# Patient Record
Sex: FEMALE | Race: WHITE | NOT HISPANIC OR LATINO | Employment: FULL TIME | URBAN - METROPOLITAN AREA
[De-identification: names, ages, dates, MRNs, and addresses within clinical notes are randomized per-mention and may not be internally consistent; named-entity substitution may affect disease eponyms.]

---

## 2018-09-05 ENCOUNTER — TELEPHONE (OUTPATIENT)
Dept: GASTROENTEROLOGY | Facility: CLINIC | Age: 23
End: 2018-09-05

## 2018-09-18 ENCOUNTER — TELEPHONE (OUTPATIENT)
Dept: GASTROENTEROLOGY | Facility: CLINIC | Age: 23
End: 2018-09-18

## 2018-09-18 NOTE — TELEPHONE ENCOUNTER
LVM for [facility] medical records team in regards to message received from call center. Informed team we still do not have records for this patient. Asked medical records team to return my phone call.

## 2018-09-20 NOTE — TELEPHONE ENCOUNTER
Talked to pt's mother and offered appt next week with Dr. Scott.  Mother states that pt wants to see Dr. Moeller. Informed that did not have any opening in the new future. Offered  October 30 and declined.  Offered  and accepted November 5.  Pt was seeing Dr. Tolentino who has moved to [place]. 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  Given my number if symptoms increase and need urgent appt.  Pt is seeing a NP in [place] at present time.

## 2018-11-01 ENCOUNTER — TELEPHONE (OUTPATIENT)
Dept: GASTROENTEROLOGY | Facility: CLINIC | Age: 23
End: 2018-11-01

## 2018-11-01 NOTE — TELEPHONE ENCOUNTER
Called and received message mailbox full on patient's cell phone #. Called home phone and spoke with patient's Mother-Cassy, reminding of appointment for 11/5/18 at 3:40PM.

## 2018-11-02 NOTE — TELEPHONE ENCOUNTER
FUTURE VISIT INFORMATION      FUTURE VISIT INFORMATION:    Date: 11/5/18    Time:     Location: [facility]  REFERRAL INFORMATION:    Referring provider:  Dr. Madrigal    Referring providers clinic:  [facility]    Reason for visit/diagnosis: Ulcerative Pancolitis    RECORDS REQUESTED FROM:       NOTES STATUS DETAILS   OFFICE NOTE from referring provider Care Everywhere    OFFICE NOTE from other specialist N/A    DISCHARGE SUMMARY from hospital Care Everywhere    OPERATIVE REPORT N/A    MEDICATION LIST Care Everywhere         ENDOSCOPY  N/A    COLONOSCOPY Care Everywhere    ERCP N/A    EUS N/A    STOOL TESTING Care Everywhere    PERTINENT LABS Care Everywhere    PATHOLOGY REPORTS (RELATED) Care Everywhere    IMAGING (CT, MRI, EGD) Care Everywhere

## 2018-11-05 ENCOUNTER — MEDICAL CORRESPONDENCE (OUTPATIENT)
Dept: HEALTH INFORMATION MANAGEMENT | Facility: CLINIC | Age: 23
End: 2018-11-05

## 2018-11-05 ENCOUNTER — OFFICE VISIT (OUTPATIENT)
Dept: GASTROENTEROLOGY | Facility: CLINIC | Age: 23
End: 2018-11-05
Payer: COMMERCIAL

## 2018-11-05 ENCOUNTER — PRE VISIT (OUTPATIENT)
Dept: GASTROENTEROLOGY | Facility: CLINIC | Age: 23
End: 2018-11-05

## 2018-11-05 VITALS
WEIGHT: 164.8 LBS | HEIGHT: 70 IN | DIASTOLIC BLOOD PRESSURE: 66 MMHG | TEMPERATURE: 97.4 F | BODY MASS INDEX: 23.59 KG/M2 | OXYGEN SATURATION: 99 % | SYSTOLIC BLOOD PRESSURE: 125 MMHG | HEART RATE: 89 BPM

## 2018-11-05 DIAGNOSIS — K51.011 ULCERATIVE PANCOLITIS WITH RECTAL BLEEDING (H): Primary | ICD-10-CM

## 2018-11-05 DIAGNOSIS — K51.90 ULCERATIVE COLITIS (H): ICD-10-CM

## 2018-11-05 RX ORDER — LORATADINE 10 MG/1
10 TABLET ORAL
COMMUNITY
End: 2020-03-23

## 2018-11-05 RX ORDER — PREDNISONE 10 MG/1
40 TABLET ORAL
COMMUNITY
Start: 2018-08-30 | End: 2019-05-28

## 2018-11-05 RX ORDER — AZATHIOPRINE 50 MG/1
TABLET ORAL
Refills: 0 | COMMUNITY
Start: 2018-07-10 | End: 2020-05-08

## 2018-11-05 RX ORDER — DIPHENHYDRAMINE HCL 25 MG
25 CAPSULE ORAL
COMMUNITY
End: 2020-03-23

## 2018-11-05 ASSESSMENT — ENCOUNTER SYMPTOMS
ABDOMINAL PAIN: 0
BOWEL INCONTINENCE: 0
DIARRHEA: 0
BLOATING: 0
VOMITING: 0
HEARTBURN: 0
BLOOD IN STOOL: 1
JAUNDICE: 0
NAUSEA: 0
RECTAL PAIN: 0
CONSTIPATION: 0

## 2018-11-05 ASSESSMENT — PAIN SCALES - GENERAL: PAINLEVEL: NO PAIN (0)

## 2018-11-05 NOTE — NURSING NOTE
Printed after visit summary given to pt.  Pt sent to the lab.  Will do remicade level today.  Printed lab order for thiopurine level sent with pt.

## 2018-11-05 NOTE — NURSING NOTE
"Chief Complaint   Patient presents with     Consult     NEW - IBD        Vitals:    11/05/18 1453   BP: 125/66   Pulse: 89   Temp: 97.4  F (36.3  C)   TempSrc: Oral   SpO2: 99%   Weight: 164 lb 12.8 oz   Height: 5' 10\"       Body mass index is 23.65 kg/(m^2).      Anibal Martinez on 11/5/2018 at 3:00 PM                       "

## 2018-11-05 NOTE — PROGRESS NOTES
IBD CLINIC VISIT     CC/REFERRING MD:  Brandt Madrigal  REASON FOR CONSULTATION: Ulcerative colitis    IBD HISTORY  Age at diagnosis: 20  Extent of disease: pancolitis  Current UC medications:  - Infliximab 5mg/kg (400mg) q6  - Azathioprine 50mg (sepetmebr 2018)  Prior UC surgeries:  Prior IBD Medications:  - Azathioprine 150mg --> flu like illness  - Canasa    DRUG MONITORING  TPMT enzyme activity:     6-TGN/6-MMPN levels:    Biologic concentration:  7/3/18: IFX: <1, Ab 30 (normal < 50) - Rocha test (q8 weeks, monotherapy)    DISEASE ASSESSMENT  Labs:  No lab results found.    Invalid input(s):  ALB,  HGB  Endoscopic assessment: 2017: normal TI, inflam polyps in ascending and transverse. eMayo 1 inflammation in distal sigmoid and rectum  Enterography: Normal small bowel (3/23/16)  Fecal calprotectin: --  C diff: Positive August 2018  pMayo score: 0/9 (on steroids)    ASSESSMENT/PLAN    1. UC: Currently she is in clinical response to infliximab and azathioprine, although is still on steroids with difficulty tapering.  I am somewhat concerned for the development of antibodies to infliximab.  Through the Rocha system she had low level antibodies with an undetectable level.  She additionally has infusion reaction that is rate related.  I wonder if her current benefit is due to the initiation of azathioprine and steroids rather than infliximab.  We need to determine if her infliximab level is therapeutic or if she has antibodies to infliximab.  Given that she is under 20 mg of prednisone I would like her to restart azathioprine at 50 mg a day.  We should ensure that she has optimal thiopurine metabolites given her difficult to control disease.  Based on the infliximab levels will proceed with either continuing infliximab/optimizing dose or changing class.  Options for class change include vedolizumab or tofacitinib.  Would opt for vedolizumab first and treat in conjunction with azathioprine.    We briefly discussed the  possibility of surgery in her future.  She is at higher risk for colectomy given the fact that she has had a hospitalization requiring IV steroids, history of pancolitis, and C. difficile positive stools.  I certainly think we should maximize medical therapy but did encourage her that now was a time to learn about surgical options in case we need to cross that bridge in the future.     -- IFX level today  -- Re-start azathioprine 50mg daily  -- Check azathioprine level in about 3 weeks (of stable dosing)  -- If antibodies to IFX at this time - rec change to another anti-TNF  -- If low levels of IFX and no antibodies, recommend optimizing thiopurine and increasing IFX dosing  -- If therapeutic levels of IFX at this time - recommend flex sig and if inflammation, change to vedolizumab or tofacitinib.    -- Consider resuming mesalamine if other therapies optimized and continued mild endoscopic inflammation    2.  C. difficile infection: Stools are currently formed.  Generally I maintain a suppressive dose of vancomycin until patients are off steroids and mucosal healing based on flexible sigmoidoscopy.  I do think she has a high risk for recurrence and should she have any increase in diarrhea, stool should be rechecked for C. difficile.  If positive again would treat with vancomycin followed by suppressive vancomycin until she is off steroids and her UC is under control.    3. Iron deficient anemia: Secondary to ulcerative colitis.  Received iron infusions in the summer 2018.  Not currently anemic but iron labs still low.  Recommended she restart oral iron supplementation daily or every other day.  If she becomes anemic would repeat iron infusions.    IBD Health Care Maintenance:    Vaccinations:  All patients on biologics should avoid live vaccines.    -- Influenza (every year)  -- TdaP (every 10 years)  -- Pneumococcal Pneumonia (once plus booster at 5 years)  -- Yearly assessment for latent Tb (verbal screening and  exam, PPD or QuantiFERON-Tb testing)    One time confirmation of immunity or serologies:  -- Hepatitis A (serologies or immunizations)  -- Hepatitis B (serologies or immunizations)  -- Varicella  -- MMR  -- HPV (all aged 18-26)  -- Meningococcal meningitis (all patients at risk for meningitis)   Due to the immunosuppression in this patient, I would not advise administration of live vaccines such as varicella/VZV, intranasal influenza, MMR, or yellow fever vaccine (if travelling).      Bone mineral density screening   -- Recommend all patients supplement with calcium and vitamin D  -- Given steroid use recommend DEXA if not already done    Cancer Screening:  Colon cancer screening:  Given pancolitis, recommend patient undergo regular dysplasia surveillance   Next dysplasia screening is recommended 2023.    Cervical cancer screening: Annual due to immunosupression    Skin cancer screening: Annual visual exam of skin by dermatologist since patient is immunocompromised    Depression Screening:  -- Over the last month, have you felt down, depressed, or hopeless? --  -- Over the last month, have you felt little interest or pleasure doing things? --    Misc:  -- Avoid tobacco use  -- Avoid NSAIDs as there is potentially a 25% chance of causing an IBD flare    Return to clinic in 3-4 months    Thank you for this consultation.  It was a pleasure to participate in the care of this patient; please contact us with any further questions.     This note was created with voice recognition software, and while reviewed for accuracy, typos may remain.     Theodore Moeller MD   of Medicine  Inflammatory Bowel Disease Program   Division of Gastroenterology, Hepatology and Nutrition  [facility]          HPI:   This is a 23-year-old female with ulcerative colitis who comes in today for another opinion and management of ongoing ulcerative colitis.    She is initially diagnosed with proctitis is a senior in high  school.  She started suppositories but and per documentation had some benefit although the patient recalls having some intermittent ongoing bleeding.  About 6 months after diagnosis she was admitted to the hospital for severe ulcerative colitis flare.  Colonoscopy at that time demonstrated eMayo 3 pancolitis.  She was started on IV steroids and induced on Remicade.  This was in 2016.  She had a clinical improvement on infliximab.  Fecal lactoferrin on 4/2/17 was normal (< 30)Repeat colonoscopy in July 2017, 8 months of therapy demonstrated eMayo 1 inflammation.     Unfortunately in May 2018, she started to have another flare with rectal bleeding.  She was re-started on canasa in May, 2018.  However she progressed and required oral prednisone, starting on June 23.  A CT scan at that point demonstrated diffuse, pan colitis.  Fecal lactoferrin on 6/22/18 was elevated at 1811, C diff testing on 6/26/18 was negative.  She was given Infliximab while on prednisone and not getting better.  Azathiorpine was started at 150mg a day, but she developed flu like symptoms and so it was stopped.      In August 2018, she became C difficile positive.  Fecal lactoferrin at that time was 684. She was treated with vancomycin. Due to ongoing steroid use, azathiorpine was re-started at a lower dose of 50mg daily.      For past year, has been having reaction with Infliximab infusions: face itching and swelling, has to have the infusion run slowly.      Last infliximab infusion was 9/17/18. Next due 11/9/18    Currently has been on prednuisone since June 23.  When down to 10mg, rectal bleeding retuned, resolved when back to 15mg of prednisone.  She is on 15mg of prednisone today. On this regimen her stool pattern is as follows:   1-2 stools  a day.   Formed.   No rectal bleeding.   No urgency.   This is her baseline.     Most recent labs: iron deficient with iron sat of 5%. Last iron infusion in August.  No anemia.      C diff testing:    12/1/16: negative  6/26/18: negative  8/5/18: positive    ROS:    No fevers or chills  No weight loss  No blurry vision, double vision or change in vision  No sore throat  No lymphadenopathy  No headache, paraesthesias, or weakness in a limb  No shortness of breath or wheezing  No chest pain or pressure  No arthralgias or myalgias  No rashes or skin changes  No odynophagia or dysphagia  No BRBPR, hematochezia, melena  No dysuria, frequency or urgency  No hot/cold intolerance or polyria  No anxiety or depression    Extra intestinal manifestations of IBD:  No uveitis/episcleritis  No aphthous ulcers   No arthritis   No erythema nodosum/pyoderma gangrenosum.     PERTINENT PAST MEDICAL HISTORY:  Past Medical History:   Diagnosis Date     Ulcerative colitis (H)        PREVIOUS SURGERIES:  No past surgical history on file.    PREVIOUS ENDOSCOPY:  Care Everywhere    ALLERGIES:     Allergies   Allergen Reactions     Infliximab Nausea and Other (See Comments)     FACE FLUSHING WITH ITCHY/SCRATCHY THROAT.        PERTINENT MEDICATIONS:    Current Outpatient Prescriptions:      azaTHIOprine (IMURAN) 50 MG tablet, , Disp: , Rfl: 0     diphenhydrAMINE (BENADRYL) 25 MG capsule, 25 mg, Disp: , Rfl:      InFLIXimab (REMICADE IV), 400 mg, Disp: , Rfl:      loratadine (CLARITIN) 10 MG tablet, 10 mg, Disp: , Rfl:      predniSONE (DELTASONE) 10 MG tablet, 40 mg, Disp: , Rfl:     SOCIAL HISTORY:  Social History     Social History     Marital status: Single     Spouse name: N/A     Number of children: N/A     Years of education: N/A     Occupational History     Not on file.     Social History Main Topics     Smoking status: Never Smoker     Smokeless tobacco: Never Used     Alcohol use Not on file     Drug use: Not on file     Sexual activity: Not on file     Other Topics Concern     Not on file     Social History Narrative     No narrative on file       FAMILY HISTORY:  Medical History Relation Name Comments   Diabetes Father      "  High Cholesterol Father       Sleep Apnea Father       Heart Disease Maternal Grandfather       Cancer (not otherwise listed) Maternal Grandmother       Hypertension Mother       Diabetes Paternal Grandfather       Heart Disease Paternal Grandfather         Past/family/social history reviewed and no changes    PHYSICAL EXAMINATION:  Constitutional: aaox3, cooperative, pleasant, not dyspneic/diaphoretic, no acute distress  Vitals reviewed: /66  Pulse 89  Temp 97.4  F (36.3  C) (Oral)  Ht 5' 10\"  Wt 164 lb 12.8 oz  SpO2 99%  BMI 23.65 kg/m2  Wt:   Wt Readings from Last 2 Encounters:   11/05/18 164 lb 12.8 oz      Eyes: Sclera anicteric/injected  Ears/nose/mouth/throat: Normal oropharynx without ulcers or exudate, mucus membranes moist, hearing intact  Neck: supple, thyroid normal size  CV: No edema  Respiratory: Unlabored breathing  Lymph: No axillary, submandibular, supraclavicular or inguinal lymphadenopathy  Abd:  Nondistended, +bs, no hepatosplenomegaly, nontender, no peritoneal signs  Skin: warm, perfused, no jaundice  Psych: Normal affect  MSK: Normal gait      PERTINENT STUDIES:  Most recent CBC:  No lab results found.  Most recent hepatic panel:  No lab results found.    Invalid input(s): ANH, ALP  Most recent creatinine:  No lab results found.        Answers for HPI/ROS submitted by the patient on 11/5/2018   General Symptoms: No  Skin Symptoms: No  HENT Symptoms: No  EYE SYMPTOMS: No  HEART SYMPTOMS: No  LUNG SYMPTOMS: No  INTESTINAL SYMPTOMS: Yes  URINARY SYMPTOMS: No  GYNECOLOGIC SYMPTOMS: No  BREAST SYMPTOMS: No  SKELETAL SYMPTOMS: No  BLOOD SYMPTOMS: No  NERVOUS SYSTEM SYMPTOMS: No  MENTAL HEALTH SYMPTOMS: No  Heart burn or indigestion: No  Nausea: No  Vomiting: No  Abdominal pain: No  Bloating: No  Constipation: No  Diarrhea: No  Blood in stool: Yes  Black stools: No  Rectal or Anal pain: No  Fecal incontinence: No  Yellowing of skin or eyes: No  Vomit with blood: No  Change in stools: " No  PHQ-2 Score: 0

## 2018-11-05 NOTE — LETTER
11/5/2018       RE: Poonam Chung  [address] [place] [address]     Dear Colleague,    Thank you for referring your patient, Poonam Chung, to the [facility] IBD [facility]INIC at [facility]. Please see a copy of my visit note below.    IBD CLINIC VISIT     CC/REFERRING MD:  Brandt Madrigal  REASON FOR CONSULTATION: Ulcerative     IBD HISTORY  Age at diagnosis: 20  Extent of disease: pancolitis  Current UC medications:  - Infliximab 5mg/kg (400mg) q6  - Azathioprine 50mg (sepetmebr 2018)  Prior UC surgeries:  Prior IBD Medications:  - Azathioprine 150mg --> flu like illness  - Canasa    DRUG MONITORING  TPMT enzyme activity:     6-TGN/6-MMPN levels:    Biologic concentration:  7/3/18: IFX: <1, Ab 30 (normal < 50) - Rocha test (q8 weeks, monotherapy)    DISEASE ASSESSMENT  Labs:  No lab results found.    Invalid input(s):  ALB,  HGB  Endoscopic assessment: 2017: normal TI, inflam polyps in ascending and transverse. eMayo 1 inflammation in distal sigmoid and rectum  Enterography: Normal small bowel (3/23/16)  Fecal calprotectin: --  C diff: Positive August 2018  pMayo score: 0/9 (on steroids)    ASSESSMENT/PLAN    1. UC: Currently she is in clinical response to infliximab and azathioprine, although is still on steroids with difficulty tapering.  I am somewhat concerned for the development of antibodies to infliximab.  Through the [facility] system she had low level antibodies with an undetectable level.  She additionally has infusion reaction that is rate related.  I wonder if her current benefit is due to the initiation of azathioprine and steroids rather than infliximab.  We need to determine if her infliximab level is therapeutic or if she has antibodies to infliximab.  Given that she is under 20 mg of prednisone I would like her to restart azathioprine at 50 mg a day.  We should ensure that she has optimal thiopurine metabolites given her difficult to control  disease.  Based on the infliximab levels will proceed with either continuing infliximab/optimizing dose or changing class.  Options for class change include vedolizumab or tofacitinib.  Would opt for vedolizumab first and treat in conjunction with azathioprine.    We briefly discussed the possibility of surgery in her future.  She is at higher risk for colectomy given the fact that she has had a hospitalization requiring IV steroids, history of pancolitis, and C. difficile positive stools.  I certainly think we should maximize medical therapy but did encourage her that now was a time to learn about surgical options in case we need to cross that bridge in the future.     -- IFX level today  -- Re-start azathioprine 50mg daily  -- Check azathioprine level in about 3 weeks (of stable dosing)  -- If antibodies to IFX at this time - rec change to another anti-TNF  -- If low levels of IFX and no antibodies, recommend optimizing thiopurine and increasing IFX dosing  -- If therapeutic levels of IFX at this time - recommend flex sig and if inflammation, change to vedolizumab or tofacitinib.    -- Consider resuming mesalamine if other therapies optimized and continued mild endoscopic inflammation    2.  C. difficile infection: Stools are currently formed.  Generally I maintain a suppressive dose of vancomycin until patients are off steroids and mucosal healing based on flexible sigmoidoscopy.  I do think she has a high risk for recurrence and should she have any increase in diarrhea, stool should be rechecked for C. difficile.  If positive again would treat with vancomycin followed by suppressive vancomycin until she is off steroids and her UC is under control.    3. Iron deficient anemia: Secondary to ulcerative colitis.  Received iron infusions in the summer 2018.  Not currently anemic but iron labs still low.  Recommended she restart oral iron supplementation daily or every other day.  If she becomes anemic would repeat  iron infusions.    IBD Health Care Maintenance:    Vaccinations:  All patients on biologics should avoid live vaccines.    -- Influenza (every year)  -- TdaP (every 10 years)  -- Pneumococcal Pneumonia (once plus booster at 5 years)  -- Yearly assessment for latent Tb (verbal screening and exam, PPD or QuantiFERON-Tb testing)    One time confirmation of immunity or serologies:  -- Hepatitis A (serologies or immunizations)  -- Hepatitis B (serologies or immunizations)  -- Varicella  -- MMR  -- HPV (all aged 18-26)  -- Meningococcal meningitis (all patients at risk for meningitis)   Due to the immunosuppression in this patient, I would not advise administration of live vaccines such as varicella/VZV, intranasal influenza, MMR, or yellow fever vaccine (if travelling).      Bone mineral density screening   -- Recommend all patients supplement with calcium and vitamin D  -- Given steroid use recommend DEXA if not already done    Cancer Screening:  Colon cancer screening:  Given pancolitis, recommend patient undergo regular dysplasia surveillance   Next dysplasia screening is recommended 2023.    Cervical cancer screening: Annual due to immunosupression    Skin cancer screening: Annual visual exam of skin by dermatologist since patient is immunocompromised    Depression Screening:  -- Over the last month, have you felt down, depressed, or hopeless? --  -- Over the last month, have you felt little interest or pleasure doing things? --    Misc:  -- Avoid tobacco use  -- Avoid NSAIDs as there is potentially a 25% chance of causing an IBD flare    Return to clinic in 3-4 months    Thank you for this consultation.  It was a pleasure to participate in the care of this patient; please contact us with any further questions.     This note was created with voice recognition software, and while reviewed for accuracy, typos may remain.     Theodore Moeller MD   of Medicine  Inflammatory Bowel Disease Program    Division of Gastroenterology, Hepatology and Nutrition  [facility]          HPI:   This is a 23-year-old female with ulcerative colitis who comes in today for another opinion and management of ongoing ulcerative colitis.    She is initially diagnosed with proctitis is a senior in high school.  She started suppositories but and per documentation had some benefit although the patient recalls having some intermittent ongoing bleeding.  About 6 months after diagnosis she was admitted to the hospital for severe ulcerative colitis flare.  Colonoscopy at that time demonstrated eMayo 3 pancolitis.  She was started on IV steroids and induced on Remicade.  This was in 2016.  She had a clinical improvement on infliximab.  Fecal lactoferrin on 4/2/17 was normal (< 30)Repeat colonoscopy in July 2017, 8 months of therapy demonstrated eMayo 1 inflammation.     Unfortunately in May 2018, she started to have another flare with rectal bleeding.  She was re-started on canasa in May, 2018.  However she progressed and required oral prednisone, starting on June 23.  A CT scan at that point demonstrated diffuse, pan colitis.  Fecal lactoferrin on 6/22/18 was elevated at 1811, C diff testing on 6/26/18 was negative.  She was given Infliximab while on prednisone and not getting better.  Azathiorpine was started at 150mg a day, but she developed flu like symptoms and so it was stopped.      In August 2018, she became C difficile positive.  Fecal lactoferrin at that time was 684. She was treated with vancomycin. Due to ongoing steroid use, azathiorpine was re-started at a lower dose of 50mg daily.      For past year, has been having reaction with Infliximab infusions: face itching and swelling, has to have the infusion run slowly.      Last infliximab infusion was 9/17/18. Next due 11/9/18    Currently has been on prednuisone since June 23.  When down to 10mg, rectal bleeding retuned, resolved when back to 15mg of prednisone.   She is on 15mg of prednisone today. On this regimen her stool pattern is as follows:   1-2 stools  a day.   Formed.   No rectal bleeding.   No urgency.   This is her baseline.     Most recent labs: iron deficient with iron sat of 5%. Last iron infusion in August.  No anemia.      C diff testin/1/16: negative  18: negative  18: positive    ROS:    No fevers or chills  No weight loss  No blurry vision, double vision or change in vision  No sore throat  No lymphadenopathy  No headache, paraesthesias, or weakness in a limb  No shortness of breath or wheezing  No chest pain or pressure  No arthralgias or myalgias  No rashes or skin changes  No odynophagia or dysphagia  No BRBPR, hematochezia, melena  No dysuria, frequency or urgency  No hot/cold intolerance or polyria  No anxiety or depression    Extra intestinal manifestations of IBD:  No uveitis/episcleritis  No aphthous ulcers   No arthritis   No erythema nodosum/pyoderma gangrenosum.     PERTINENT PAST MEDICAL HISTORY:  No past medical history on file.    PREVIOUS SURGERIES:  No past surgical history on file.    PREVIOUS ENDOSCOPY:  Care Everywhere    ALLERGIES:     Allergies   Allergen Reactions     Infliximab Nausea and Other (See Comments)     FACE FLUSHING WITH ITCHY/SCRATCHY THROAT.        PERTINENT MEDICATIONS:    Current Outpatient Prescriptions:      azaTHIOprine (IMURAN) 50 MG tablet, , Disp: , Rfl: 0     diphenhydrAMINE (BENADRYL) 25 MG capsule, 25 mg, Disp: , Rfl:      InFLIXimab (REMICADE IV), 400 mg, Disp: , Rfl:      loratadine (CLARITIN) 10 MG tablet, 10 mg, Disp: , Rfl:      predniSONE (DELTASONE) 10 MG tablet, 40 mg, Disp: , Rfl:     SOCIAL HISTORY:  Social History     Social History     Marital status: Single     Spouse name: N/A     Number of children: N/A     Years of education: N/A     Occupational History     Not on file.     Social History Main Topics     Smoking status: Never Smoker     Smokeless tobacco: Never Used     Alcohol  "use Not on file     Drug use: Not on file     Sexual activity: Not on file     Other Topics Concern     Not on file     Social History Narrative     No narrative on file       FAMILY HISTORY:  No family history on file.    Past/family/social history reviewed and no changes    PHYSICAL EXAMINATION:  Constitutional: aaox3, cooperative, pleasant, not dyspneic/diaphoretic, no acute distress  Vitals reviewed: /66  Pulse 89  Temp 97.4  F (36.3  C) (Oral)  Ht 5' 10\"  Wt 164 lb 12.8 oz  SpO2 99%  BMI 23.65 kg/m2  Wt:   Wt Readings from Last 2 Encounters:   11/05/18 164 lb 12.8 oz      Eyes: Sclera anicteric/injected  Ears/nose/mouth/throat: Normal oropharynx without ulcers or exudate, mucus membranes moist, hearing intact  Neck: supple, thyroid normal size  CV: No edema  Respiratory: Unlabored breathing  Lymph: No axillary, submandibular, supraclavicular or inguinal lymphadenopathy  Abd:  Nondistended, +bs, no hepatosplenomegaly, nontender, no peritoneal signs  Skin: warm, perfused, no jaundice  Psych: Normal affect  MSK: Normal gait      PERTINENT STUDIES:  Most recent CBC:  No lab results found.  Most recent hepatic panel:  No lab results found.    Invalid input(s): ANH, ALP  Most recent creatinine:  No lab results found.      Again, thank you for allowing me to participate in the care of your patient.      Sincerely,    Theodore Moeller MD      "

## 2018-11-05 NOTE — MR AVS SNAPSHOT
After Visit Summary   11/5/2018    Poonam Chung    MRN: [id number]           Patient Information     Date Of Birth          1995        Visit Information        Provider Department      11/5/2018 3:40 PM Theodore Moeller MD [facility]        Today's Diagnoses     Ulcerative colitis (H)    -  1      Care Instructions    Nice to meet you today      Remicade level today  Lab tests today at the 1st floor -- you will be notified of results by letter or my chart message in 7-10 days.  You will receive a phone call if more urgent follow up is needed.      Thiopurine level in 3 weeks after taking the azathioprine daily     Restart the iron supplement daily      Let us know if you start having diarrhea and may need to do a flex sig to assess for inflammation     Let us know if you would want to follow up       Can try Tumeric supplement  1.5 to 3 grams a day      Thanks Gaviota Zelaya RN Care Coordinator for Dr. Moeller  Phone   [phone]       For questions regarding your care Monday through Friday, contact the RN GI care coordinator,  Call   [phone] . Your call will be  returned same day, or if consultation is needed with the provider, it may be following business day - or you may send a My Chart message.    For medication refills (prescribed by the GI clinic), contact your pharmacy.    For appointment rescheduling/cancellation, contact [phone]     After hours, or if you have an immediate GI concern and cannot wait for a return call, contact the GI Fellow at [phone] and select option #4.              Follow-ups after your visit        Your next 10 appointments already scheduled     Dec 21, 2018  7:00 AM CST   (Arrive by 6:45 AM)   New Patient Visit with Camilo Rodriguez PA-C   [facility] ([facility])    [address]  4th Floor  [facility] [address] [phone]              Future  "tests that were ordered for you today     Open Future Orders        Priority Expected Expires Ordered    Thiopurine Metabolites by LC-MS/MS Routine 11/26/2018 1/4/2019 11/5/2018    Infliximab level Routine  11/6/2019 11/5/2018            Who to contact     Please call your clinic at [phone] to:    Ask questions about your health    Make or cancel appointments    Discuss your medicines    Learn about your test results    Speak to your doctor            Additional Information About Your Visit        [employer] Information     [employer] [facility] gives you secure access to your electronic health record. If you see a primary care provider, you can also send messages to your care team and make appointments. If you have questions, please call your primary care clinic.  If you do not have a primary care provider, please call [phone] and they will assist you.      [employer] [facility] is an electronic gateway that provides easy, online access to your medical records. With [employer] [facility], you can request a clinic appointment, read your test results, renew a prescription or communicate with your care team.     To access your existing account, please contact your [facility] or call [phone] for assistance.        Care EveryWhere ID     This is your Care EveryWhere ID. This could be used by other organizations to access your [facility] medical records  [id number]        Your Vitals Were     Pulse Temperature Height Pulse Oximetry BMI (Body Mass Index)       89 97.4  F (36.3  C) (Oral) 1.778 m (5' 10\") 99% 23.65 kg/m2        Blood Pressure from Last 3 Encounters:   11/05/18 125/66    Weight from Last 3 Encounters:   11/05/18 74.8 kg (164 lb 12.8 oz)               Primary Care Provider Office Phone # Fax #    Savanah SMITHA Richard [phone]       [facility] [address]  [place] [address]        Equal Access to Services     LUIS MCRAE AH: Emilie Terrell, mya geiger, qaybta " trenton asenciolukas maier ah. So [facility] [phone].    ATENCIÓN: Si santos tong, tiene a hein disposición servicios gratuitos de asistencia lingüística. Dana al [phone].    We comply with applicable federal civil rights laws and [place] laws. We do not discriminate on the basis of race, color, national origin, age, disability, sex, sexual orientation, or gender identity.            Thank you!     Thank you for choosing [facility]INIC  for your care. Our goal is always to provide you with excellent care. Hearing back from our patients is one way we can continue to improve our services. Please take a few minutes to complete the written survey that you may receive in the mail after your visit with us. Thank you!             Your Updated Medication List - Protect others around you: Learn how to safely use, store and throw away your medicines at www.disposemymeds.org.          This list is accurate as of 11/5/18  4:29 PM.  Always use your most recent med list.                   Brand Name Dispense Instructions for use Diagnosis    azaTHIOprine 50 MG tablet    IMURAN          diphenhydrAMINE 25 MG capsule    BENADRYL     25 mg        loratadine 10 MG tablet    CLARITIN     10 mg        predniSONE 10 MG tablet    DELTASONE     40 mg        REMICADE IV      400 mg

## 2018-11-05 NOTE — PATIENT INSTRUCTIONS
Nice to meet you today      Remicade level today  Lab tests today at the 1st floor -- you will be notified of results by letter or my chart message in 7-10 days.  You will receive a phone call if more urgent follow up is needed.      Thiopurine level in 3 weeks after taking the azathioprine daily     Restart the iron supplement daily      Let us know if you start having diarrhea and may need to do a flex sig to assess for inflammation     Let us know if you would want to follow up       Can try Tumeric supplement  1.5 to 3 grams a day      Thanks Gaviota Zelaya RN Care Coordinator for Dr. Moeller  Phone   [phone]       For questions regarding your care Monday through Friday, contact the RN GI care coordinator,  Call   [phone] . Your call will be  returned same day, or if consultation is needed with the provider, it may be following business day - or you may send a My Chart message.    For medication refills (prescribed by the GI clinic), contact your pharmacy.    For appointment rescheduling/cancellation, contact [phone]     After hours, or if you have an immediate GI concern and cannot wait for a return call, contact the GI Fellow at [phone] and select option #4.

## 2018-11-06 ENCOUNTER — MEDICAL CORRESPONDENCE (OUTPATIENT)
Dept: HEALTH INFORMATION MANAGEMENT | Facility: CLINIC | Age: 23
End: 2018-11-06

## 2018-11-14 ENCOUNTER — HEALTH MAINTENANCE LETTER (OUTPATIENT)
Age: 23
End: 2018-11-14

## 2018-11-28 ENCOUNTER — PATIENT OUTREACH (OUTPATIENT)
Dept: GASTROENTEROLOGY | Facility: CLINIC | Age: 23
End: 2018-11-28

## 2018-11-28 LAB — LAB SCANNED RESULT: ABNORMAL

## 2018-11-28 NOTE — PROGRESS NOTES
Left a message for pt to call back .              Continue azathioprine.     Stop Remicade.     Start Humira.     Eval with Judith to discuss antibodies to anti-TNFs and rold of ccombination therapy with biologic and IMM (e.g. antibody prevention) and risks.       Continue steroid taper.     Flex sig 8-12 weeks after starting Humira

## 2018-11-29 ENCOUNTER — PATIENT OUTREACH (OUTPATIENT)
Dept: GASTROENTEROLOGY | Facility: CLINIC | Age: 23
End: 2018-11-29

## 2018-11-29 NOTE — PROGRESS NOTES
Pt called back and answered all of her questons that she presented in her my chart message. Pt will call and cancel her remicade infusion appt. Pt will probably stay with Estefani's office for her care. Await  call back from [facility].

## 2018-11-29 NOTE — PROGRESS NOTES
Called and discussed the following questions from pt and pt's mom. Pt goes to college in [place].  Mother will have pt to discuss options.  Pt has NP in [place] and need to more out details related to humira etc.                        Dr. Moeller,   This is Solange Maderaah's mom.  She asked me to send this message as she's in school, and we both have questions.  Here are our questions for you.   What was the level of antibodies? Was it up quite a bit?   With increase in antibodies, is this why reaction was worse at last Remicade infusion? (She had swelling of her tongue, which really concerned me!)   Would it help to increase the Imuran?   Will she need to remain on the Imuran?   Once off Remicade, can a person ever be put back on it again?   Will antibodies come down as she is off the Remicade?   Who will put in for prior authorization of Humira?  The next dose of Remicade is due around 12/21.   Will you send a letter/or call Zelda Jara NP at [facility]?  She's the one that's been working with Poonam in Gastroenterology.     Who will do patient teaching?   How well tolerated is the Humira?   Do they have a medication assistance program like Remicade does?   Thank you,   Cassy Chung

## 2018-11-29 NOTE — PROGRESS NOTES
Called Zelda Jara NP [facility] and left a message to call back to discuss the plan.

## 2018-12-05 ENCOUNTER — PATIENT OUTREACH (OUTPATIENT)
Dept: GASTROENTEROLOGY | Facility: CLINIC | Age: 23
End: 2018-12-05

## 2018-12-07 ENCOUNTER — PATIENT OUTREACH (OUTPATIENT)
Dept: GASTROENTEROLOGY | Facility: CLINIC | Age: 23
End: 2018-12-07

## 2019-05-14 ENCOUNTER — PATIENT OUTREACH (OUTPATIENT)
Dept: GASTROENTEROLOGY | Facility: CLINIC | Age: 24
End: 2019-05-14

## 2019-05-14 DIAGNOSIS — K51.90 ULCERATIVE COLITIS (H): Primary | ICD-10-CM

## 2019-05-14 NOTE — PROGRESS NOTES
Mother of patient sent a my chart message about scheduling colonoscopy for pt this summer. Order placed to be scheduled with Dr. Moeller.

## 2019-05-21 ENCOUNTER — TELEPHONE (OUTPATIENT)
Dept: GASTROENTEROLOGY | Facility: OUTPATIENT CENTER | Age: 24
End: 2019-05-21

## 2019-05-28 ENCOUNTER — TELEPHONE (OUTPATIENT)
Dept: GASTROENTEROLOGY | Facility: OUTPATIENT CENTER | Age: 24
End: 2019-05-28

## 2019-05-28 NOTE — TELEPHONE ENCOUNTER
Patient taking any blood thinners ? No     Heart disease ? Denies     Lung disease ? Denies       Sleep apnea ?Denies     Diabetic ?Denies     Kidney disease ?Denies     Electronic implanted medical devices ?Denies     Are you taking any narcotic pain medication ? Denies   What is your daily dosage ?    PTSD ? N/a     Prep instructions reviewed with patient ? Instructions, policy, conscious sedation plan reviewed. Advised patient to have someone stay with them post exam.    Pharmacy : N/a    Indication for procedure :Ulcerative colitis (H) [K51.90]    Referring provider : CHAZ CROCKETT     Arrival Time :7 AM

## 2019-06-03 ENCOUNTER — DOCUMENTATION ONLY (OUTPATIENT)
Dept: GASTROENTEROLOGY | Facility: OUTPATIENT CENTER | Age: 24
End: 2019-06-03
Payer: COMMERCIAL

## 2019-06-03 ENCOUNTER — TRANSFERRED RECORDS (OUTPATIENT)
Dept: HEALTH INFORMATION MANAGEMENT | Facility: CLINIC | Age: 24
End: 2019-06-03

## 2019-06-05 LAB — COPATH REPORT: NORMAL

## 2019-08-27 ENCOUNTER — PATIENT OUTREACH (OUTPATIENT)
Dept: GASTROENTEROLOGY | Facility: CLINIC | Age: 24
End: 2019-08-27

## 2019-08-27 NOTE — PROGRESS NOTES
Returned voice mail message from pt. Pt states she has some light pink tinged stools. Just started this past 3 days.  Denies any loose stools. Some cramping after eating that goes away after bowel movement. Patient is seeing NP in [place]. Pt goes to college in [place].  Pt transitioned to humira in December ordered by her NP. Next dose of humira due on September 5th. Discussed if she was here could see her in clinic. . Last seen in  November 2018. Colonoscopy in June of 2019.  Due for labs and also would do humira level. Pt is going to call her NP Zelda Jara to discuss sympotoms. Also discussed could send orders for labs but pt

## 2019-08-27 NOTE — PROGRESS NOTES
Pt had left a message that she would like a call back to discuss her flare systems.  Left message with  my name and number  for pt to call back

## 2019-08-28 ENCOUNTER — TELEPHONE (OUTPATIENT)
Dept: GASTROENTEROLOGY | Facility: CLINIC | Age: 24
End: 2019-08-28

## 2019-08-28 ENCOUNTER — PATIENT OUTREACH (OUTPATIENT)
Dept: GASTROENTEROLOGY | Facility: CLINIC | Age: 24
End: 2019-08-28

## 2019-08-28 DIAGNOSIS — K51.90 ULCERATIVE COLITIS (H): Primary | ICD-10-CM

## 2019-08-28 RX ORDER — BUDESONIDE 28 MG/1
1 AEROSOL, FOAM RECTAL 2 TIMES DAILY
Qty: 33.4 G | Refills: 2 | Status: SHIPPED | OUTPATIENT
Start: 2019-08-28 | End: 2019-08-29

## 2019-08-28 NOTE — TELEPHONE ENCOUNTER
[facility] Call Center    Phone Message    May a detailed message be left on voicemail: yes    Reason for Call: Medication Question or concern regarding medication   Prescription Clarification  Name of Medication: Uceris  Prescribing Provider: Sajan   Pharmacy: [facility] [address] [facility], [place] - [address]   What on the order needs clarification? Need dosing clarification, as only comes in 2 mg. Please call to discuss with pharmacy          Action Taken: Message routed to:  [facility] GI

## 2019-08-28 NOTE — PROGRESS NOTES
Discussed with mother the plan. Pt to start uceris foam for two weeks if improved can stop if bleeding reoccurs to contact the GI clinic and restart uceris foam. Will deter imine if needs flex sig or calprotectin.  If uceris not covered with try to proctofoam

## 2019-08-28 NOTE — PROGRESS NOTES
Mother had written a my chart message. Discussed with mother that the reflux gi symptoms probably not related to her ulcerative colitis. Asked mother if patient under a lot of stress. She stated that pt has been working 2 jobs and more than full time employment going to school and applying for internship.  Discussed the relation of stress plays in overall health. Suggested that an appointment with Dr. Gong at the time of her next apt would be beneficial. Also pt could look into a health psychology appt at her college or near her home.    Also pt needs to call to schedule appt.

## 2019-08-29 ENCOUNTER — PATIENT OUTREACH (OUTPATIENT)
Dept: GASTROENTEROLOGY | Facility: CLINIC | Age: 24
End: 2019-08-29

## 2019-08-29 DIAGNOSIS — K51.90 ULCERATIVE COLITIS (H): ICD-10-CM

## 2019-08-29 RX ORDER — BUDESONIDE 28 MG/1
2 AEROSOL, FOAM RECTAL 2 TIMES DAILY
Qty: 33.4 G | Refills: 2 | Status: SHIPPED | OUTPATIENT
Start: 2019-08-29 | End: 2019-11-11

## 2019-09-09 ENCOUNTER — PATIENT OUTREACH (OUTPATIENT)
Dept: GASTROENTEROLOGY | Facility: CLINIC | Age: 24
End: 2019-09-09

## 2019-09-10 ENCOUNTER — PATIENT OUTREACH (OUTPATIENT)
Dept: GASTROENTEROLOGY | Facility: CLINIC | Age: 24
End: 2019-09-10

## 2019-09-10 NOTE — PROGRESS NOTES
APPT November 15.   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

## 2019-10-01 ENCOUNTER — DOCUMENTATION ONLY (OUTPATIENT)
Dept: CARE COORDINATION | Facility: CLINIC | Age: 24
End: 2019-10-01

## 2019-11-08 ENCOUNTER — PATIENT OUTREACH (OUTPATIENT)
Dept: GASTROENTEROLOGY | Facility: CLINIC | Age: 24
End: 2019-11-08

## 2019-11-08 NOTE — TELEPHONE ENCOUNTER
Returned  voice mail message from pt. Left a voice mail message to call back.  Talked to mother and will talk to pt on Monday.

## 2019-11-11 ENCOUNTER — TELEPHONE (OUTPATIENT)
Dept: GASTROENTEROLOGY | Facility: CLINIC | Age: 24
End: 2019-11-11

## 2019-11-11 ENCOUNTER — PATIENT OUTREACH (OUTPATIENT)
Dept: GASTROENTEROLOGY | Facility: CLINIC | Age: 24
End: 2019-11-11

## 2019-11-11 DIAGNOSIS — K51.90 ULCERATIVE COLITIS (H): ICD-10-CM

## 2019-11-11 RX ORDER — BUDESONIDE 28 MG/1
2 AEROSOL, FOAM RECTAL 2 TIMES DAILY
Qty: 33.4 G | Refills: 2 | Status: SHIPPED | OUTPATIENT
Start: 2019-11-11 | End: 2020-03-23

## 2019-11-11 NOTE — TELEPHONE ENCOUNTER
[facility] Call Center    Phone Message    May a detailed message be left on voicemail: yes    Reason for Call: Other: Pharmacy called in and stated this is only a 14 day supply did  want to give the patient two boxes at a time. Please follow up with the pharmacy asap to discuss. Thank you.     Action Taken: Message routed to:  [facility] edith

## 2019-11-11 NOTE — TELEPHONE ENCOUNTER
Discussed with pt her symptoms. Pt has an appt on Friday. Pt due for her next humira injecton on Thursday. The 14th.  Will hold until her appt on the Friday the 15th.    Will do labs and humira level when at clinic visit. Pt will restart uceris foam.

## 2019-11-12 ENCOUNTER — TELEPHONE (OUTPATIENT)
Dept: GASTROENTEROLOGY | Facility: CLINIC | Age: 24
End: 2019-11-12

## 2019-11-12 NOTE — TELEPHONE ENCOUNTER
Called and left message for patient reminding of appointment scheduled on 11/15/19 at 1220 with [facility]. Patient to arrive 15 min early. To reschedule or cancel patient to call [phone].    STEPHY Hines

## 2019-11-15 ENCOUNTER — OFFICE VISIT (OUTPATIENT)
Dept: GASTROENTEROLOGY | Facility: CLINIC | Age: 24
End: 2019-11-15
Payer: COMMERCIAL

## 2019-11-15 ENCOUNTER — TRANSFERRED RECORDS (OUTPATIENT)
Dept: HEALTH INFORMATION MANAGEMENT | Facility: CLINIC | Age: 24
End: 2019-11-15

## 2019-11-15 VITALS
SYSTOLIC BLOOD PRESSURE: 112 MMHG | TEMPERATURE: 97.9 F | HEIGHT: 70 IN | HEART RATE: 84 BPM | WEIGHT: 168.7 LBS | BODY MASS INDEX: 24.15 KG/M2 | DIASTOLIC BLOOD PRESSURE: 73 MMHG | OXYGEN SATURATION: 98 %

## 2019-11-15 DIAGNOSIS — K51.00 CHRONIC UNIVERSAL ULCERATIVE COLITIS (H): ICD-10-CM

## 2019-11-15 DIAGNOSIS — K51.00 CHRONIC UNIVERSAL ULCERATIVE COLITIS (H): Primary | ICD-10-CM

## 2019-11-15 LAB
ALBUMIN SERPL-MCNC: 3.4 G/DL (ref 3.4–5)
ALP SERPL-CCNC: 39 U/L (ref 40–150)
ALT SERPL W P-5'-P-CCNC: 11 U/L (ref 0–50)
AST SERPL W P-5'-P-CCNC: 7 U/L (ref 0–45)
BASOPHILS # BLD AUTO: 0 10E9/L (ref 0–0.2)
BASOPHILS NFR BLD AUTO: 0.5 %
BILIRUB DIRECT SERPL-MCNC: 0.1 MG/DL (ref 0–0.2)
BILIRUB SERPL-MCNC: 0.4 MG/DL (ref 0.2–1.3)
CRP SERPL-MCNC: 23.5 MG/L (ref 0–8)
DIFFERENTIAL METHOD BLD: ABNORMAL
EOSINOPHIL # BLD AUTO: 0.5 10E9/L (ref 0–0.7)
EOSINOPHIL NFR BLD AUTO: 8 %
ERYTHROCYTE [DISTWIDTH] IN BLOOD BY AUTOMATED COUNT: 13.5 % (ref 10–15)
ERYTHROCYTE [SEDIMENTATION RATE] IN BLOOD BY WESTERGREN METHOD: 95 MM/H (ref 0–20)
HCT VFR BLD AUTO: 31.6 % (ref 35–47)
HGB BLD-MCNC: 10.1 G/DL (ref 11.7–15.7)
IMM GRANULOCYTES # BLD: 0 10E9/L (ref 0–0.4)
IMM GRANULOCYTES NFR BLD: 0.5 %
LYMPHOCYTES # BLD AUTO: 1.5 10E9/L (ref 0.8–5.3)
LYMPHOCYTES NFR BLD AUTO: 27.2 %
MCH RBC QN AUTO: 29.4 PG (ref 26.5–33)
MCHC RBC AUTO-ENTMCNC: 32 G/DL (ref 31.5–36.5)
MCV RBC AUTO: 92 FL (ref 78–100)
MISCELLANEOUS TEST: NORMAL
MONOCYTES # BLD AUTO: 0.6 10E9/L (ref 0–1.3)
MONOCYTES NFR BLD AUTO: 11 %
NEUTROPHILS # BLD AUTO: 3 10E9/L (ref 1.6–8.3)
NEUTROPHILS NFR BLD AUTO: 52.8 %
NRBC # BLD AUTO: 0 10*3/UL
NRBC BLD AUTO-RTO: 0 /100
PLATELET # BLD AUTO: 359 10E9/L (ref 150–450)
PROT SERPL-MCNC: 7.2 G/DL (ref 6.8–8.8)
RBC # BLD AUTO: 3.44 10E12/L (ref 3.8–5.2)
WBC # BLD AUTO: 5.7 10E9/L (ref 4–11)

## 2019-11-15 RX ORDER — BUDESONIDE 9 MG/1
9 TABLET, FILM COATED, EXTENDED RELEASE ORAL EVERY MORNING
Qty: 30 TABLET | Refills: 1 | Status: SHIPPED | OUTPATIENT
Start: 2019-11-15 | End: 2020-03-23

## 2019-11-15 ASSESSMENT — ENCOUNTER SYMPTOMS
JAUNDICE: 0
RECTAL PAIN: 0
VOMITING: 0
NAUSEA: 0
BLOATING: 0
DIARRHEA: 1
ABDOMINAL PAIN: 1
BOWEL INCONTINENCE: 0
HEARTBURN: 0
BLOOD IN STOOL: 1
CONSTIPATION: 0

## 2019-11-15 ASSESSMENT — PAIN SCALES - GENERAL: PAINLEVEL: NO PAIN (0)

## 2019-11-15 ASSESSMENT — MIFFLIN-ST. JEOR: SCORE: 1605.47

## 2019-11-15 NOTE — NURSING NOTE
"Chief Complaint   Patient presents with     RECHECK     follow up IBD       Vitals:    11/15/19 1220   BP: 112/73   Pulse: 84   Temp: 97.9  F (36.6  C)   TempSrc: Oral   SpO2: 98%   Weight: 76.5 kg (168 lb 11.2 oz)   Height: 1.778 m (5' 10\")       Body mass index is 24.21 kg/m .    Daniela Jackson CMA    "

## 2019-11-15 NOTE — PATIENT INSTRUCTIONS
Great to see you today    Read literature on entyvio literature      Go on crohns and coliits website.     Stool studies today  You may  your stool sample containers at Lab 1st Floor  before you leave today.  You may drop off the stool samples at any [facility]        Labs today  Lab tests today at the 1st floor -- you will be notified of results by letter or my chart message in 7-10 days.  You will receive a phone call if more urgent follow up is needed.        Humira level today      Work toward prior for humira weekly       Uceris (entocort) daily        Thanks Gaviota Zelaya RN Care Coordinator for Dr. Moeller  Phone   [phone]     For questions regarding your care Monday through Friday, contact the RN GI care coordinator,  Call   [phone] . Your call will be  returned same day, or if consultation is needed with the provider, it may be following business day - or you may send a My Chart message.    For medication refills (prescribed by the GI clinic), contact your pharmacy.    For appointment rescheduling/cancellation, contact [phone]     After hours, or if you have an immediate GI concern and cannot wait for a return call, contact the GI Fellow at [phone] and select option #4.    Follow up in 4 months

## 2019-11-15 NOTE — LETTER
11/15/2019       RE: Poonam Chung  [address]  [facility] [address]     Dear Colleague,    Thank you for referring your patient, Poonam Chnug, to the [facility] IBD [facility]IN[facility] at [facility]. Please see a copy of my visit note below.    IBD CLINIC VISIT     CC/REFERRING MD:  Brandt Madrigal  REASON FOR CONSULTATION: Ulcerative colitis    ASSESSMENT/PLAN  24 year old female with ulcerative colitis.     1. UC: Currently with a moderate flare of UC.  Had developed antibodies to infliximab.  Initially responded to adalimumab at every other week dosing, but has now had some disease progression. Not clear if loss of response related to low levels or mechanistic failure.  Will obtain adalimumab concentrations, ideally try for weekly adalimumab. However also discussed vedolizumab.    -- Increase adalimumab to weekly  -- Adalimuab trough concentration  -- Uceris for current flare   -- Fecal calprotectin  -- Stool for infection.     2.  C. difficile infection: Check C diff given new symptoms.     3. Iron deficient anemia: Anemic with normal MCV. Likely anemia related to UC.   -- Continue oral iron.   -- Consider re-transfusion of iron if Hb < 9.     IBD HISTORY  Age at diagnosis: 20  Extent of disease: pancolitis  Current UC medications:  - Adalimumab every other week (started Nov 2018)  - Azathioprine 50mg (sepetmebr 2018)  Prior UC surgeries:  Prior IBD Medications:  - Azathioprine 150mg --> flu like illness  - Canasa  - Infliximab 5mg/kg (400mg) q6    DRUG MONITORING  TPMT enzyme activity:     6-TGN/6-MMPN levels:    Biologic concentration:  7/3/18: IFX: <1, Ab 30 (normal < 50) - Rocha test (q8 weeks, monotherapy)  11/5/18: IFX: Not detected, antibodies: 14    DISEASE ASSESSMENT  Labs:  No lab results found.    Invalid input(s):  ALB,  HGB  Endoscopic assessment: 2017: normal TI, inflam polyps in ascending and transverse. eMayo 1 inflammation in distal sigmoid and  rectum  Enterography: Normal small bowel (3/23/16)  Fecal calprotectin: --  C diff: Positive August 2018  pMayo score: 0/9 (on steroids)    IBD Health Care Maintenance:    Vaccinations:  All patients on biologics should avoid live vaccines.    -- Influenza (every year)  -- TdaP (every 10 years)  -- Pneumococcal Pneumonia (once plus booster at 5 years)  -- Yearly assessment for latent Tb (verbal screening and exam, PPD or QuantiFERON-Tb testing)    One time confirmation of immunity or serologies:  -- Hepatitis A (serologies or immunizations)  -- Hepatitis B (serologies or immunizations)  -- Varicella  -- MMR  -- HPV (all aged 18-26)  -- Meningococcal meningitis (all patients at risk for meningitis)   Due to the immunosuppression in this patient, I would not advise administration of live vaccines such as varicella/VZV, intranasal influenza, MMR, or yellow fever vaccine (if travelling).      Bone mineral density screening   -- Recommend all patients supplement with calcium and vitamin D  -- Given steroid use recommend DEXA if not already done    Cancer Screening:  Colon cancer screening:  Given pancolitis, recommend patient undergo regular dysplasia surveillance   Next dysplasia screening is recommended 2023.    Cervical cancer screening: Annual due to immunosupression    Skin cancer screening: Annual visual exam of skin by dermatologist since patient is immunocompromised    Depression Screening:  PHQ-2 Score:     PHQ-2 ( 1999 Pfizer) 11/15/2019 11/5/2018   Q1: Little interest or pleasure in doing things 0 0   Q2: Feeling down, depressed or hopeless 0 0   PHQ-2 Score 0 0   Q1: Little interest or pleasure in doing things Not at all Not at all   Q2: Feeling down, depressed or hopeless Not at all Not at all   PHQ-2 Score 0 0         Misc:  -- Avoid tobacco use  -- Avoid NSAIDs as there is potentially a 25% chance of causing an IBD flare    Return to clinic in 3-4 months    Thank you for this consultation.  It was a  pleasure to participate in the care of this patient; please contact us with any further questions.     This note was created with voice recognition software, and while reviewed for accuracy, typos may remain.     Theodore Moeller MD   of Medicine  Inflammatory Bowel Disease Program   Division of Gastroenterology, Hepatology and Nutrition  [facility]          HPI:   Since I saw her last - found antibodies to Infliximab  Transistioned to Humira.     CT scan 18: diffuse colitis transverse to sigmoid    Colonoscopy: 6/3/19:   eMayo 2 in rectum - 2cm. Normal proximal mucosa. Pseudopolyps from transverse to rectum    Had 2 courses of prednisone in past year.     Currently having 5-6 stools a day (normal is 2).  Seing blood all the time. Not passing blood alone. Symptoms started in September - but have slowly gotten worse. Has been on the steroid foam since October - it helped a little bit initially, but by 2nd week,.     Also worse with stress in school and roommate issues.      Rocha score:   Stool freq: 2 (3-4 stools/day more than normal)  Rectal bleedin (Visible blood more than half of the time)  PGA: 2 (Moderate)  Endoscopy: Not done    C diff testin/1/16: negative  18: negative  18: positive    ROS:    No fevers or chills  No weight loss  No blurry vision, double vision or change in vision  No sore throat  No lymphadenopathy  No headache, paraesthesias, or weakness in a limb  No shortness of breath or wheezing  No chest pain or pressure  No arthralgias or myalgias  No rashes or skin changes  No odynophagia or dysphagia  No BRBPR, hematochezia, melena  No dysuria, frequency or urgency  No hot/cold intolerance or polyria  No anxiety or depression    Extra intestinal manifestations of IBD:  No uveitis/episcleritis  No aphthous ulcers   No arthritis   No erythema nodosum/pyoderma gangrenosum.     PERTINENT PAST MEDICAL HISTORY:  Past Medical History:   Diagnosis Date      Ulcerative colitis (H)        PREVIOUS SURGERIES:  No past surgical history on file.    PREVIOUS ENDOSCOPY:  Care Everywhere    ALLERGIES:     Allergies   Allergen Reactions     Infliximab Nausea and Other (See Comments)     FACE FLUSHING WITH ITCHY/SCRATCHY THROAT.        PERTINENT MEDICATIONS:    Current Outpatient Medications:      azaTHIOprine (IMURAN) 50 MG tablet, , Disp: , Rfl: 0     diphenhydrAMINE (BENADRYL) 25 MG capsule, 25 mg, Disp: , Rfl:      loratadine (CLARITIN) 10 MG tablet, 10 mg, Disp: , Rfl:      UCERIS 2 MG/ACT FOAM, Place 2 mg rectally 2 times daily, Disp: 33.4 g, Rfl: 2    SOCIAL HISTORY:  Social History     Socioeconomic History     Marital status: Single     Spouse name: Not on file     Number of children: Not on file     Years of education: Not on file     Highest education level: Not on file   Occupational History     Not on file   Social Needs     Financial resource strain: Not on file     Food insecurity:     Worry: Not on file     Inability: Not on file     Transportation needs:     Medical: Not on file     Non-medical: Not on file   Tobacco Use     Smoking status: Never Smoker     Smokeless tobacco: Never Used   Substance and Sexual Activity     Alcohol use: Not on file     Drug use: Not on file     Sexual activity: Not on file   Lifestyle     Physical activity:     Days per week: Not on file     Minutes per session: Not on file     Stress: Not on file   Relationships     Social connections:     Talks on phone: Not on file     Gets together: Not on file     Attends Confucianism service: Not on file     Active member of club or organization: Not on file     Attends meetings of clubs or organizations: Not on file     Relationship status: Not on file     Intimate partner violence:     Fear of current or ex partner: Not on file     Emotionally abused: Not on file     Physically abused: Not on file     Forced sexual activity: Not on file   Other Topics Concern     Not on file   Social  "History Narrative     Not on file       FAMILY HISTORY:  Medical History Relation Name Comments   Diabetes Father       High Cholesterol Father       Sleep Apnea Father       Heart Disease Maternal Grandfather       Cancer (not otherwise listed) Maternal Grandmother       Hypertension Mother       Diabetes Paternal Grandfather       Heart Disease Paternal Grandfather         Past/family/social history reviewed and no changes    PHYSICAL EXAMINATION:  Constitutional: aaox3, cooperative, pleasant, not dyspneic/diaphoretic, no acute distress  Vitals reviewed: /73   Pulse 84   Temp 97.9  F (36.6  C) (Oral)   Ht 1.778 m (5' 10\")   Wt 76.5 kg (168 lb 11.2 oz)   SpO2 98%   BMI 24.21 kg/m     Wt:   Wt Readings from Last 2 Encounters:   11/15/19 76.5 kg (168 lb 11.2 oz)   11/05/18 74.8 kg (164 lb 12.8 oz)      Eyes: Sclera anicteric/injected  Ears/nose/mouth/throat: Normal oropharynx without ulcers or exudate, mucus membranes moist, hearing intact  Neck: supple, thyroid normal size  CV: No edema  Respiratory: Unlabored breathing  Lymph: No axillary, submandibular, supraclavicular or inguinal lymphadenopathy  Abd:  Nondistended, +bs, no hepatosplenomegaly, nontender, no peritoneal signs  Skin: warm, perfused, no jaundice  Psych: Normal affect  MSK: Normal gait    PERTINENT STUDIES:  Most recent CBC:  No lab results found.  Most recent hepatic panel:  No lab results found.    Invalid input(s): ANH, ALP  Most recent creatinine:  No lab results found.    Again, thank you for allowing me to participate in the care of your patient.      Sincerely,    Theodore Moeller MD      "

## 2019-11-15 NOTE — PROGRESS NOTES
IBD CLINIC VISIT     CC/REFERRING MD:  Brandt Madrigal  REASON FOR CONSULTATION: Ulcerative colitis    ASSESSMENT/PLAN  24 year old female with ulcerative colitis.     1. UC: Currently with a moderate flare of UC.  Had developed antibodies to infliximab.  Initially responded to adalimumab at every other week dosing, but has now had some disease progression. Not clear if loss of response related to low levels or mechanistic failure.  Will obtain adalimumab concentrations, ideally try for weekly adalimumab. However also discussed vedolizumab.    -- Increase adalimumab to weekly  -- Adalimuab trough concentration  -- Uceris for current flare   -- Fecal calprotectin  -- Stool for infection.     2.  C. difficile infection: Check C diff given new symptoms.     3. Iron deficient anemia: Anemic with normal MCV. Likely anemia related to UC.   -- Continue oral iron.   -- Consider re-transfusion of iron if Hb < 9.     IBD HISTORY  Age at diagnosis: 20  Extent of disease: pancolitis  Current UC medications:  - Adalimumab every other week (started Nov 2018)  - Azathioprine 50mg (sepetmebr 2018)  Prior UC surgeries:  Prior IBD Medications:  - Azathioprine 150mg --> flu like illness  - Canasa  - Infliximab 5mg/kg (400mg) q6    DRUG MONITORING  TPMT enzyme activity:     6-TGN/6-MMPN levels:    Biologic concentration:  7/3/18: IFX: <1, Ab 30 (normal < 50) - Rocha test (q8 weeks, monotherapy)  11/5/18: IFX: Not detected, antibodies: 14    DISEASE ASSESSMENT  Labs:  No lab results found.    Invalid input(s):  ALB,  HGB  Endoscopic assessment: 2017: normal TI, inflam polyps in ascending and transverse. eMayo 1 inflammation in distal sigmoid and rectum  Enterography: Normal small bowel (3/23/16)  Fecal calprotectin: --  C diff: Positive August 2018  pMayo score: 0/9 (on steroids)    IBD Health Care Maintenance:    Vaccinations:  All patients on biologics should avoid live vaccines.    -- Influenza (every year)  -- TdaP (every 10  years)  -- Pneumococcal Pneumonia (once plus booster at 5 years)  -- Yearly assessment for latent Tb (verbal screening and exam, PPD or QuantiFERON-Tb testing)    One time confirmation of immunity or serologies:  -- Hepatitis A (serologies or immunizations)  -- Hepatitis B (serologies or immunizations)  -- Varicella  -- MMR  -- HPV (all aged 18-26)  -- Meningococcal meningitis (all patients at risk for meningitis)   Due to the immunosuppression in this patient, I would not advise administration of live vaccines such as varicella/VZV, intranasal influenza, MMR, or yellow fever vaccine (if travelling).      Bone mineral density screening   -- Recommend all patients supplement with calcium and vitamin D  -- Given steroid use recommend DEXA if not already done    Cancer Screening:  Colon cancer screening:  Given pancolitis, recommend patient undergo regular dysplasia surveillance   Next dysplasia screening is recommended 2023.    Cervical cancer screening: Annual due to immunosupression    Skin cancer screening: Annual visual exam of skin by dermatologist since patient is immunocompromised    Depression Screening:  PHQ-2 Score:     PHQ-2 ( 1999 Pfizer) 11/15/2019 11/5/2018   Q1: Little interest or pleasure in doing things 0 0   Q2: Feeling down, depressed or hopeless 0 0   PHQ-2 Score 0 0   Q1: Little interest or pleasure in doing things Not at all Not at all   Q2: Feeling down, depressed or hopeless Not at all Not at all   PHQ-2 Score 0 0         Misc:  -- Avoid tobacco use  -- Avoid NSAIDs as there is potentially a 25% chance of causing an IBD flare    Return to clinic in 3-4 months    Thank you for this consultation.  It was a pleasure to participate in the care of this patient; please contact us with any further questions.     This note was created with voice recognition software, and while reviewed for accuracy, typos may remain.     Theodore Moeller MD   of Medicine  Inflammatory Bowel Disease  Program   Division of Gastroenterology, Hepatology and Nutrition  [facility]          HPI:   Since I saw her last - found antibodies to Infliximab  Transistioned to Humira.     CT scan 18: diffuse colitis transverse to sigmoid    Colonoscopy: 6/3/19:   eMayo 2 in rectum - 2cm. Normal proximal mucosa. Pseudopolyps from transverse to rectum    Had 2 courses of prednisone in past year.     Currently having 5-6 stools a day (normal is 2).  Seing blood all the time. Not passing blood alone. Symptoms started in September - but have slowly gotten worse. Has been on the steroid foam since October - it helped a little bit initially, but by 2nd week,.     Also worse with stress in school and roommate issues.      Rocha score:   Stool freq: 2 (3-4 stools/day more than normal)  Rectal bleedin (Visible blood more than half of the time)  PGA: 2 (Moderate)  Endoscopy: Not done    C diff testin/1/16: negative  18: negative  18: positive    ROS:    No fevers or chills  No weight loss  No blurry vision, double vision or change in vision  No sore throat  No lymphadenopathy  No headache, paraesthesias, or weakness in a limb  No shortness of breath or wheezing  No chest pain or pressure  No arthralgias or myalgias  No rashes or skin changes  No odynophagia or dysphagia  No BRBPR, hematochezia, melena  No dysuria, frequency or urgency  No hot/cold intolerance or polyria  No anxiety or depression    Extra intestinal manifestations of IBD:  No uveitis/episcleritis  No aphthous ulcers   No arthritis   No erythema nodosum/pyoderma gangrenosum.     PERTINENT PAST MEDICAL HISTORY:  Past Medical History:   Diagnosis Date     Ulcerative colitis (H)        PREVIOUS SURGERIES:  No past surgical history on file.    PREVIOUS ENDOSCOPY:  Care Everywhere    ALLERGIES:     Allergies   Allergen Reactions     Infliximab Nausea and Other (See Comments)     FACE FLUSHING WITH ITCHY/SCRATCHY THROAT.        PERTINENT  MEDICATIONS:    Current Outpatient Medications:      azaTHIOprine (IMURAN) 50 MG tablet, , Disp: , Rfl: 0     diphenhydrAMINE (BENADRYL) 25 MG capsule, 25 mg, Disp: , Rfl:      loratadine (CLARITIN) 10 MG tablet, 10 mg, Disp: , Rfl:      UCERIS 2 MG/ACT FOAM, Place 2 mg rectally 2 times daily, Disp: 33.4 g, Rfl: 2    SOCIAL HISTORY:  Social History     Socioeconomic History     Marital status: Single     Spouse name: Not on file     Number of children: Not on file     Years of education: Not on file     Highest education level: Not on file   Occupational History     Not on file   Social Needs     Financial resource strain: Not on file     Food insecurity:     Worry: Not on file     Inability: Not on file     Transportation needs:     Medical: Not on file     Non-medical: Not on file   Tobacco Use     Smoking status: Never Smoker     Smokeless tobacco: Never Used   Substance and Sexual Activity     Alcohol use: Not on file     Drug use: Not on file     Sexual activity: Not on file   Lifestyle     Physical activity:     Days per week: Not on file     Minutes per session: Not on file     Stress: Not on file   Relationships     Social connections:     Talks on phone: Not on file     Gets together: Not on file     Attends Presybeterian service: Not on file     Active member of club or organization: Not on file     Attends meetings of clubs or organizations: Not on file     Relationship status: Not on file     Intimate partner violence:     Fear of current or ex partner: Not on file     Emotionally abused: Not on file     Physically abused: Not on file     Forced sexual activity: Not on file   Other Topics Concern     Not on file   Social History Narrative     Not on file       FAMILY HISTORY:  Medical History Relation Name Comments   Diabetes Father       High Cholesterol Father       Sleep Apnea Father       Heart Disease Maternal Grandfather       Cancer (not otherwise listed) Maternal Grandmother       Hypertension Mother  "      Diabetes Paternal Grandfather       Heart Disease Paternal Grandfather         Past/family/social history reviewed and no changes    PHYSICAL EXAMINATION:  Constitutional: aaox3, cooperative, pleasant, not dyspneic/diaphoretic, no acute distress  Vitals reviewed: /73   Pulse 84   Temp 97.9  F (36.6  C) (Oral)   Ht 1.778 m (5' 10\")   Wt 76.5 kg (168 lb 11.2 oz)   SpO2 98%   BMI 24.21 kg/m    Wt:   Wt Readings from Last 2 Encounters:   11/15/19 76.5 kg (168 lb 11.2 oz)   11/05/18 74.8 kg (164 lb 12.8 oz)      Eyes: Sclera anicteric/injected  Ears/nose/mouth/throat: Normal oropharynx without ulcers or exudate, mucus membranes moist, hearing intact  Neck: supple, thyroid normal size  CV: No edema  Respiratory: Unlabored breathing  Lymph: No axillary, submandibular, supraclavicular or inguinal lymphadenopathy  Abd:  Nondistended, +bs, no hepatosplenomegaly, nontender, no peritoneal signs  Skin: warm, perfused, no jaundice  Psych: Normal affect  MSK: Normal gait      PERTINENT STUDIES:  Most recent CBC:  No lab results found.  Most recent hepatic panel:  No lab results found.    Invalid input(s): ANH, ALP  Most recent creatinine:  No lab results found.        Answers for HPI/ROS submitted by the patient on 11/15/2019   General Symptoms: No  Skin Symptoms: No  HENT Symptoms: No  EYE SYMPTOMS: No  HEART SYMPTOMS: No  LUNG SYMPTOMS: No  INTESTINAL SYMPTOMS: Yes  URINARY SYMPTOMS: No  GYNECOLOGIC SYMPTOMS: No  BREAST SYMPTOMS: No  SKELETAL SYMPTOMS: No  BLOOD SYMPTOMS: No  NERVOUS SYSTEM SYMPTOMS: No  MENTAL HEALTH SYMPTOMS: No  Heart burn or indigestion: No  Nausea: No  Vomiting: No  Abdominal pain: Yes  Bloating: No  Constipation: No  Diarrhea: Yes  Blood in stool: Yes  Black stools: No  Rectal or Anal pain: No  Fecal incontinence: No  Yellowing of skin or eyes: No  Vomit with blood: No  Change in stools: Yes    "

## 2019-11-16 DIAGNOSIS — K51.00 CHRONIC UNIVERSAL ULCERATIVE COLITIS (H): ICD-10-CM

## 2019-11-16 LAB
C DIFF TOX B STL QL: NEGATIVE
SPECIMEN SOURCE: NORMAL

## 2019-11-18 ENCOUNTER — TELEPHONE (OUTPATIENT)
Dept: GASTROENTEROLOGY | Facility: CLINIC | Age: 24
End: 2019-11-18

## 2019-11-18 LAB
CALPROTECTIN STL-MCNT: 5210 MG/KG (ref 0–49.9)
O+P STL MICRO: NORMAL
SPECIMEN SOURCE: NORMAL

## 2019-11-18 NOTE — TELEPHONE ENCOUNTER
PA Initiation    Medication: HUMIRA WEEKLY   Insurance Company: [facility] - Phone [phone] Fax [phone]  Pharmacy Filling the Rx: [facility] - [place], [place] - [address] [facility]  Filling Pharmacy Phone:    Filling Pharmacy Fax:    Start Date: 11/18/2019

## 2019-11-19 NOTE — TELEPHONE ENCOUNTER
PRIOR AUTHORIZATION DENIED    Medication: HUMIRA WEEKLY - Denied     Denial Date: 11/19/2019    Denial Rational:  Non-FDA approved dosing     Appeal Information:  See below

## 2019-11-22 LAB
6-TGN ENTSUB RBC: 269 (ref 235–400)
6MMP ENTSUB RBC: 2765

## 2019-11-25 ENCOUNTER — PATIENT OUTREACH (OUTPATIENT)
Dept: GASTROENTEROLOGY | Facility: CLINIC | Age: 24
End: 2019-11-25

## 2019-11-25 LAB — LAB SCANNED RESULT: NORMAL

## 2019-11-25 NOTE — PROGRESS NOTES
Patient called to request refill in Azathioprine.    Patient states she is overall feeling better but still having loose stools  Humira level 3.2 no antibodies detected.   Routed to Dr. Moeller for plan.

## 2019-11-26 ENCOUNTER — TELEPHONE (OUTPATIENT)
Dept: GASTROENTEROLOGY | Facility: CLINIC | Age: 24
End: 2019-11-26

## 2019-11-26 NOTE — TELEPHONE ENCOUNTER
Medication Appeal Initiation    We have initiated an appeal for the requested medication:  Medication: HUMIRA WEEKLY - Appeal initiated   Appeal Start Date:   11/26/2019  Insurance Company:  [employer]  Comments:   Letter in letters tab

## 2019-11-26 NOTE — TELEPHONE ENCOUNTER
MEDICATION APPEAL APPROVED    Medication: HUMIRA WEEKLY - Appeal approved   Authorization Effective Date:  11/26/2019  Authorization Expiration Date:  11/26/2020  Approved Dose/Quantity:  4 FOR 28 DAYS  Reference #: [id number]   Insurance Company: [employer] - Phone [phone] Fax [phone]  Expected CoPay:       CoPay Card Available:      Foundation Assistance Needed:    Which Pharmacy is filling the prescription (Not needed for infusion/clinic administered): [facility] - [place], [address]

## 2019-11-26 NOTE — TELEPHONE ENCOUNTER
[facility] Call Center    Phone Message    May a detailed message be left on voicemail: yes    Reason for Call: Medication Question or concern regarding medication   Prescription Clarification  Name of Medication: adalimumab (HUMIRA *CF* PEN) 40 MG/0.4ML pen kit  Prescribing Provider: Theodore Moeller   Pharmacy:  [id number], [place] - [address]     What on the order needs clarification? [facility] called and stated they are needing clarification on if this is a valid script as it is missing the quantity per the pharmacist. Please call or update the script.            Action Taken: Message routed to:  [facility] GI

## 2019-11-26 NOTE — TELEPHONE ENCOUNTER
Called pharmacy to clarify the prescription.   Pharmacist came on the line and apologized as technician did not understand the prescription but it was clear that pt needed every 7 days.   Approved with 5 dollar copay.

## 2019-12-30 DIAGNOSIS — K51.00 CHRONIC UNIVERSAL ULCERATIVE COLITIS (H): ICD-10-CM

## 2019-12-31 ENCOUNTER — TELEPHONE (OUTPATIENT)
Dept: GASTROENTEROLOGY | Facility: CLINIC | Age: 24
End: 2019-12-31

## 2019-12-31 DIAGNOSIS — K51.00 CHRONIC UNIVERSAL ULCERATIVE COLITIS (H): ICD-10-CM

## 2019-12-31 NOTE — TELEPHONE ENCOUNTER
Pt wants humira to go to [facility] pharmacy. [facility] can not transfer as no refills. prescription was sent with 5 refills to [place]. [facility]

## 2020-01-03 RX ORDER — ADALIMUMAB 40MG/0.4ML
KIT SUBCUTANEOUS
Qty: 1.6 ML | Refills: 0 | OUTPATIENT
Start: 2020-01-03

## 2020-03-11 ENCOUNTER — HEALTH MAINTENANCE LETTER (OUTPATIENT)
Age: 25
End: 2020-03-11

## 2020-03-13 ENCOUNTER — PATIENT OUTREACH (OUTPATIENT)
Dept: GASTROENTEROLOGY | Facility: CLINIC | Age: 25
End: 2020-03-13

## 2020-03-23 ENCOUNTER — VIRTUAL VISIT (OUTPATIENT)
Dept: GASTROENTEROLOGY | Facility: CLINIC | Age: 25
End: 2020-03-23
Payer: COMMERCIAL

## 2020-03-23 DIAGNOSIS — K51.00 ULCERATIVE PANCOLITIS WITHOUT COMPLICATION (H): Primary | ICD-10-CM

## 2020-03-23 NOTE — PROGRESS NOTES
"Poonam Chung is a 24 year old female who is being evaluated via a billable telephone visit.      The patient has been notified of following:     \"This telephone visit will be conducted via a call between you and your physician/provider. We have found that certain health care needs can be provided without the need for a physical exam.  This service lets us provide the care you need with a short phone conversation.  If a prescription is necessary we can send it directly to your pharmacy.  If lab work is needed we can place an order for that and you can then stop by our lab to have the test done at a later time.    If during the course of the call the physician/provider feels a telephone visit is not appropriate, you will not be charged for this service.\"     Poonam Chung complains of  No chief complaint on file.      I have reviewed and updated the patient's Past Medical History, Social History, Family History and Medication List.    ALLERGIES  Infliximab    Total phone time: 20 minutes    IBD CLINIC VISIT     CC/REFERRING MD:  Brandt Madrigal  REASON FOR CONSULTATION: Ulcerative colitis    ASSESSMENT/PLAN  24 year old female with ulcerative colitis.     1. UC: Currently she is in clinical remission on adalimumab every week and azathioprine 50 mg once a day to prevent antibody formation.  She is due for blood work now on dual therapy.  She is also due for endoscopic restaging, which we will hold at this time given the coronavirus outbreak.    We discussed the importance of remaining on the patient's current immunosuppressive therapy.  Discussed that the risks of coronavirus on these medications were small.  Discussed that should medications be interrupted, there could be a flare of the underlying inflammatory bowel disease which could require prednisone or hospitalization, both of which would likely increase the risk of coronavirus beyond that of the current medications.  Also discussed that should we enter a " medications there is no guarantee that they would be effective again.  Finally we discussed the importance of hand hygiene, social isolation, and following all routine recommendations from the CDC.     -- Continue adalimumab weekly and azathiropine daily   -- Blood work this Spring  -- Azathiorpine metabolites along with blood draw  -- Tentative plan for flex sig end of summer    2.  C. difficile infection: Check C diff if new symptoms.     3. Iron deficient anemia: Anemic with normal MCV. Likely anemia related to UC.   -- Re-check CBC  -- Continue oral iron.   -- Consider re-transfusion of iron if Hb < 9.     4. Oral ulcers: Concerned that these may be related to reactive herpes virus in the setting immune suppression.  Overall they are well controlled with lysine, but they are somewhat bothersome.  We discussed options which include stopping azathioprine, and if continued ulcerations on adalimumab then considering changing adalimumab to vedolizumab or potentially even Ustekinumab.  Discussed that the risks could be development of antibodies to adalimumab.  Discussed that the risk of changing medication could be a disease flare.  Prior to making any medication changes, I would like to confirm that these are in fact related to reactive herpes or another virus.  I recommend that she go to her primary care doctor or infectious disease doctor for testing when she has active lesions.    IBD HISTORY  Age at diagnosis: 20  Extent of disease: pancolitis  Current UC medications:  - Adalimumab every week (started Nov 2018, dose escalated Nov 2019)  - Azathioprine 50mg (sepetmebr 2018)  Prior UC surgeries:  Prior IBD Medications:  - Azathioprine 150mg --> flu like illness  - Canasa  - Infliximab 5mg/kg (400mg) q6    DRUG MONITORING  TPMT enzyme activity:     6-TGN/6-MMPN levels:    Biologic concentration:  7/3/18: IFX: <1, Ab 30 (normal < 50) - Rocha test (q8 weeks, monotherapy)  11/5/18: IFX: Not detected, antibodies:  14    11/15/19: Adalimumab: 3.2, no antibodies     DISEASE ASSESSMENT  Labs:  Recent Labs   Lab Test 11/15/19  1331   CRP 23.5*   SED 95*     Endoscopic assessment: 2017: normal TI, inflam polyps in ascending and transverse. eMayo 1 inflammation in distal sigmoid and rectum  Enterography: Normal small bowel (3/23/16)  Fecal calprotectin: --  C diff: Positive August 2018  pMayo score: 0/9 (on steroids)    IBD Health Care Maintenance:    Vaccinations:  All patients on biologics should avoid live vaccines.    -- Influenza (every year)  -- TdaP (every 10 years)  -- Pneumococcal Pneumonia (once plus booster at 5 years)  -- Yearly assessment for latent Tb (verbal screening and exam, PPD or QuantiFERON-Tb testing)    One time confirmation of immunity or serologies:  -- Hepatitis A (serologies or immunizations)  -- Hepatitis B (serologies or immunizations)  -- Varicella  -- MMR  -- HPV (all aged 18-26)  -- Meningococcal meningitis (all patients at risk for meningitis)   Due to the immunosuppression in this patient, I would not advise administration of live vaccines such as varicella/VZV, intranasal influenza, MMR, or yellow fever vaccine (if travelling).      Bone mineral density screening   -- Recommend all patients supplement with calcium and vitamin D  -- Given steroid use recommend DEXA if not already done    Cancer Screening:  Colon cancer screening:  Given pancolitis, recommend patient undergo regular dysplasia surveillance   Next dysplasia screening is recommended 2023.    Cervical cancer screening: Annual due to immunosupression    Skin cancer screening: Annual visual exam of skin by dermatologist since patient is immunocompromised    Depression Screening:  PHQ-2 Score:     PHQ-2 ( 1999 Pfizer) 11/15/2019 11/5/2018   Q1: Little interest or pleasure in doing things 0 0   Q2: Feeling down, depressed or hopeless 0 0   PHQ-2 Score 0 0   Q1: Little interest or pleasure in doing things Not at all Not at all   Q2: Feeling  "down, depressed or hopeless Not at all Not at all   PHQ-2 Score 0 0         Misc:  -- Avoid tobacco use  -- Avoid NSAIDs as there is potentially a 25% chance of causing an IBD flare    Return to clinic in 3-4 months    Thank you for this consultation.  It was a pleasure to participate in the care of this patient; please contact us with any further questions.     This note was created with voice recognition software, and while reviewed for accuracy, typos may remain.     Theodore Moeller MD   of Medicine  Inflammatory Bowel Disease Program   Division of Gastroenterology, Hepatology and Nutrition  [facility]          HPI:   Having 1 -2 stools a day. No blood. Stools are formed. No urgency  No abdominal pain, nausea or heartburn.     No issues with Humira injections.     Is having recurrent \"cold sores\".  She takes lycine for these, which controls them for the most part.     Rocha score:   Stool freq: 0 (baseline stools frequency)  Rectal bleedin (None)  PGA: 0 (normal)  Endoscopy: Not done    C diff testin/1/16: negative  18: negative  18: positive  19: Negative    ROS:    No fevers or chills  No weight loss  No blurry vision, double vision or change in vision  No sore throat  No lymphadenopathy  No headache, paraesthesias, or weakness in a limb  No shortness of breath or wheezing  No chest pain or pressure  No arthralgias or myalgias  No rashes or skin changes  No odynophagia or dysphagia  No BRBPR, hematochezia, melena  No dysuria, frequency or urgency  No hot/cold intolerance or polyria  No anxiety or depression    Extra intestinal manifestations of IBD:  No uveitis/episcleritis  No aphthous ulcers   No arthritis   No erythema nodosum/pyoderma gangrenosum.     PERTINENT PAST MEDICAL HISTORY:  Past Medical History:   Diagnosis Date     Ulcerative colitis (H)        PREVIOUS SURGERIES:  No past surgical history on file.    PREVIOUS ENDOSCOPY:  Care " Everywhere    ALLERGIES:     Allergies   Allergen Reactions     Infliximab Nausea and Other (See Comments)     FACE FLUSHING WITH ITCHY/SCRATCHY THROAT.        PERTINENT MEDICATIONS:    Current Outpatient Medications:      adalimumab (HUMIRA *CF* PEN) 40 MG/0.4ML pen kit, Inject 0.4 mLs (40 mg) Subcutaneous every 7 days, Disp: 4 each, Rfl: 5     azaTHIOprine (IMURAN) 50 MG tablet, , Disp: , Rfl: 0     budesonide (UCERIS) 9 MG 24 hr tablet, Take 1 tablet (9 mg) by mouth every morning, Disp: 30 tablet, Rfl: 1     diphenhydrAMINE (BENADRYL) 25 MG capsule, 25 mg, Disp: , Rfl:      loratadine (CLARITIN) 10 MG tablet, 10 mg, Disp: , Rfl:      UCERIS 2 MG/ACT FOAM, Place 2 mg rectally 2 times daily, Disp: 33.4 g, Rfl: 2    SOCIAL HISTORY:  Social History     Socioeconomic History     Marital status: Single     Spouse name: Not on file     Number of children: Not on file     Years of education: Not on file     Highest education level: Not on file   Occupational History     Not on file   Social Needs     Financial resource strain: Not on file     Food insecurity     Worry: Not on file     Inability: Not on file     Transportation needs     Medical: Not on file     Non-medical: Not on file   Tobacco Use     Smoking status: Never Smoker     Smokeless tobacco: Never Used   Substance and Sexual Activity     Alcohol use: Not on file     Drug use: Not on file     Sexual activity: Not on file   Lifestyle     Physical activity     Days per week: Not on file     Minutes per session: Not on file     Stress: Not on file   Relationships     Social connections     Talks on phone: Not on file     Gets together: Not on file     Attends [employer] service: Not on file     Active member of club or organization: Not on file     Attends meetings of clubs or organizations: Not on file     Relationship status: Not on file     Intimate partner violence     Fear of current or ex partner: Not on file     Emotionally abused: Not on file      Physically abused: Not on file     Forced sexual activity: Not on file   Other Topics Concern     Not on file   Social History Narrative     Not on file       FAMILY HISTORY:  Medical History Relation Name Comments   Diabetes Father       High Cholesterol Father       Sleep Apnea Father       Heart Disease Maternal Grandfather       Cancer (not otherwise listed) Maternal Grandmother       Hypertension Mother       Diabetes Paternal Grandfather       Heart Disease Paternal Grandfather         Past/family/social history reviewed and no changes    PHYSICAL EXAMINATION:  Deferred on telephone visit.       PERTINENT STUDIES:  Most recent CBC:  Recent Labs   Lab Test 11/15/19  1331   WBC 5.7   HGB 10.1*   HCT 31.6*        Most recent hepatic panel:  Recent Labs   Lab Test 11/15/19  1331   ALT 11   AST 7     Most recent creatinine:  No lab results found.        Answers for HPI/ROS submitted by the patient on 11/15/2019   General Symptoms: No  Skin Symptoms: No  HENT Symptoms: No  EYE SYMPTOMS: No  HEART SYMPTOMS: No  LUNG SYMPTOMS: No  INTESTINAL SYMPTOMS: Yes  URINARY SYMPTOMS: No  GYNECOLOGIC SYMPTOMS: No  BREAST SYMPTOMS: No  SKELETAL SYMPTOMS: No  BLOOD SYMPTOMS: No  NERVOUS SYSTEM SYMPTOMS: No  MENTAL HEALTH SYMPTOMS: No  Heart burn or indigestion: No  Nausea: No  Vomiting: No  Abdominal pain: Yes  Bloating: No  Constipation: No  Diarrhea: Yes  Blood in stool: Yes  Black stools: No  Rectal or Anal pain: No  Fecal incontinence: No  Yellowing of skin or eyes: No  Vomit with blood: No  Change in stools: Yes

## 2020-03-23 NOTE — PATIENT INSTRUCTIONS
-- You are doing fantastic.  Overall plan is to continue the Humira and azathioprine    --You are due for blood work this spring, this can be done at any laboratory near you in the next 1 to 2 months.    --In terms of the cold sores, the first step is to figure out if these are related to herpes or not.  Most primary care offices are able to do this test.  However due to the coronavirus, this may have to wait for a few weeks to months.    --Typically would be due for a flexible sigmoidoscopy, however given the coronavirus we are holding off on elective procedures.  We will tentatively plan on this at the end of summer or beginning of fall.

## 2020-03-30 ENCOUNTER — PATIENT OUTREACH (OUTPATIENT)
Dept: GASTROENTEROLOGY | Facility: CLINIC | Age: 25
End: 2020-03-30

## 2020-03-30 NOTE — PROGRESS NOTES
Contacted pt to discuss the plan. Due to covid 19 has not scheduled appt or called her primary care about herpes lesions biopsy.   Scheduled pt for 6 month follow up before college starts.   Pt will contact clinic if she needs infectious disease appt/referral or if any  symptoms develop. Patient  has my direct number.          Plan -   When she can (probably 1-3 months) see if her PCP can test her oral lesions for herpes.  If her PCP cannot, then will need to see ID     Otherwise follow-up in 6 months and discuss flex sig at that time

## 2020-05-08 ENCOUNTER — PATIENT OUTREACH (OUTPATIENT)
Dept: GASTROENTEROLOGY | Facility: CLINIC | Age: 25
End: 2020-05-08

## 2020-05-08 DIAGNOSIS — K51.00 ULCERATIVE PANCOLITIS (H): ICD-10-CM

## 2020-05-08 DIAGNOSIS — K51.90 ULCERATIVE COLITIS (H): Primary | ICD-10-CM

## 2020-05-08 RX ORDER — AZATHIOPRINE 50 MG/1
150 TABLET ORAL DAILY
Qty: 90 TABLET | Refills: 5 | Status: SHIPPED | OUTPATIENT
Start: 2020-05-08 | End: 2021-01-22

## 2020-05-08 NOTE — PROGRESS NOTES
Refilled azathioprine. Pt due for labs.  Faxed lab order to [facility]  [phone]   My chart message and delayed my chart message.

## 2020-08-05 ENCOUNTER — PATIENT OUTREACH (OUTPATIENT)
Dept: GASTROENTEROLOGY | Facility: CLINIC | Age: 25
End: 2020-08-05

## 2020-08-05 NOTE — PROGRESS NOTES
Patient left a voice mail message about her upcoming visit switched to video visit. Left pt a message.

## 2020-08-24 ENCOUNTER — VIRTUAL VISIT (OUTPATIENT)
Dept: GASTROENTEROLOGY | Facility: CLINIC | Age: 25
End: 2020-08-24
Payer: COMMERCIAL

## 2020-08-24 VITALS — HEIGHT: 70 IN | BODY MASS INDEX: 23.62 KG/M2 | WEIGHT: 165 LBS

## 2020-08-24 DIAGNOSIS — K51.00 ULCERATIVE PANCOLITIS (H): Primary | ICD-10-CM

## 2020-08-24 RX ORDER — BUDESONIDE 28 MG/1
AEROSOL, FOAM RECTAL
COMMUNITY
Start: 2019-11-11 | End: 2023-03-14

## 2020-08-24 ASSESSMENT — PAIN SCALES - GENERAL: PAINLEVEL: NO PAIN (0)

## 2020-08-24 ASSESSMENT — MIFFLIN-ST. JEOR: SCORE: 1588.69

## 2020-08-24 NOTE — LETTER
8/24/2020       RE: Poonam Chung  [address]  [facility] [address]     Dear Colleague,    Thank you for referring your patient, Poonam Chung, to the [facility] GASTRO[facility] AND IBD CLINIC at [facility]. Please see a copy of my visit note below.    IBD CLINIC VISIT     CC/REFERRING MD:  Brandt Madrigal  REASON FOR CONSULTATION: Ulcerative colitis    ASSESSMENT/PLAN  24 year old female with ulcerative colitis.     1. UC: Currently she is in clinical remission on adalimumab every week and azathioprine 50 mg once a day to prevent antibody formation.  Discussed that ideally we would want to perform flexible sigmoidoscopy to assess for mucosal response.  Given the coronavirus pandemic, as well as her distance from the [facility], it is not feasible to do this at this time.  We will instead have her get a fecal quantitative lactoferrin as a surrogate biomarker.    We discussed the importance of remaining on the patient's current immunosuppressive therapy.  Discussed that the risks of coronavirus on these medications were small.  Discussed that should medications be interrupted, there could be a flare of the underlying inflammatory bowel disease which could require prednisone or hospitalization, both of which would likely increase the risk of coronavirus beyond that of the current medications.  Also discussed that should we enter a medications there is no guarantee that they would be effective again.  Finally we discussed the importance of hand hygiene, social isolation, and following all routine recommendations from the CDC.     -- Continue adalimumab weekly and azathiropine daily   -- Fecal lactoferrin   -- Blood work in 6 months    2.  C. difficile infection: Check C diff if new symptoms.     3. Iron deficient anemia: Anemic with normal MCV. Likely anemia related to UC.   -- Re-check CBC  -- Continue oral iron.   -- Consider re-transfusion of iron if Hb < 9.     4. Oral ulcers:  Resolved at this time, suspect related to stress in school and not azathioprine.    IBD HISTORY  Age at diagnosis: 20  Extent of disease: pancolitis  Current UC medications:  - Adalimumab every week (started Nov 2018, dose escalated Nov 2019)  - Azathioprine 50mg (sepetmebr 2018)  Prior UC surgeries:  Prior IBD Medications:  - Azathioprine 150mg --> flu like illness  - Canasa  - Infliximab 5mg/kg (400mg) q6    DRUG MONITORING  TPMT enzyme activity:     6-TGN/6-MMPN levels:  7/15620: 6-TGN: 137, 6MMPN 3888    Biologic concentration:  7/3/18: IFX: <1, Ab 30 (normal < 50) - Rocha test (q8 weeks, monotherapy)  11/5/18: IFX: Not detected, antibodies: 14    11/15/19: Adalimumab: 3.2, no antibodies     DISEASE ASSESSMENT  Labs:  Recent Labs   Lab Test 11/15/19  1331   CRP 23.5*   SED 95*     Endoscopic assessment: 2017: normal TI, inflam polyps in ascending and transverse. eMayo 1 inflammation in distal sigmoid and rectum  Enterography: Normal small bowel (3/23/16)  Fecal calprotectin: --  C diff: Positive August 2018  pMayo score: 0/9 (on steroids)    IBD Health Care Maintenance:    Vaccinations:  All patients on biologics should avoid live vaccines.    -- Influenza (every year)  -- TdaP (every 10 years)  -- Pneumococcal Pneumonia (once plus booster at 5 years)  -- Yearly assessment for latent Tb (verbal screening and exam, PPD or QuantiFERON-Tb testing)    One time confirmation of immunity or serologies:  -- Hepatitis A (serologies or immunizations)  -- Hepatitis B (serologies or immunizations)  -- Varicella  -- MMR  -- HPV (all aged 18-26)  -- Meningococcal meningitis (all patients at risk for meningitis)   Due to the immunosuppression in this patient, I would not advise administration of live vaccines such as varicella/VZV, intranasal influenza, MMR, or yellow fever vaccine (if travelling).      Bone mineral density screening   -- Recommend all patients supplement with calcium and vitamin D  -- Given steroid use  recommend DEXA if not already done    Cancer Screening:  Colon cancer screening:  Given pancolitis, recommend patient undergo regular dysplasia surveillance   Next dysplasia screening is recommended .    Cervical cancer screening: Annual due to immunosupression    Skin cancer screening: Annual visual exam of skin by dermatologist since patient is immunocompromised    Depression Screening:  PHQ-2 Score:     PHQ-2 (  Pfizer) 11/15/2019 2018   Q1: Little interest or pleasure in doing things 0 0   Q2: Feeling down, depressed or hopeless 0 0   PHQ-2 Score 0 0   Q1: Little interest or pleasure in doing things Not at all Not at all   Q2: Feeling down, depressed or hopeless Not at all Not at all   PHQ-2 Score 0 0     Misc:  -- Avoid tobacco use  -- Avoid NSAIDs as there is potentially a 25% chance of causing an IBD flare    Return to clinic in 6 months    Thank you for this consultation.  It was a pleasure to participate in the care of this patient; please contact us with any further questions.     This note was created with voice recognition software, and while reviewed for accuracy, typos may remain.     Theodore Moeller MD   of Medicine  Inflammatory Bowel Disease Program   Division of Gastroenterology, Hepatology and Nutrition  [facility]      HPI:   Here today for routine follow-up.      Overall doing well.  Weekly humira has been very helpful.  Remains on azathioprine.      Cold sores have not been as bothersome.  Stress has decreased. But has not been an issue.     Currently having 1-2 stools a day.  Stools are formed and no blood.  No urgency. Otherwise no other abdominal pain, nausea vomiting or hearburn    No issues with C diff - no other antibiotics.     Works for [employer]. Is in her last semester of school. She is doing flex learning.     Rocha score:   Stool freq: 0 (baseline stools frequency)  Rectal bleedin (None)  PGA: 0 (normal)  Endoscopy: Not done in past  3 months    C diff testin/1/16: negative  18: negative  18: positive  19: Negative    ROS:    No fevers or chills  No weight loss  No blurry vision, double vision or change in vision  No sore throat  No lymphadenopathy  No headache, paraesthesias, or weakness in a limb  No shortness of breath or wheezing  No chest pain or pressure  No arthralgias or myalgias  No rashes or skin changes  No odynophagia or dysphagia  No BRBPR, hematochezia, melena  No dysuria, frequency or urgency  No hot/cold intolerance or polyria  No anxiety or depression    Extra intestinal manifestations of IBD:  No uveitis/episcleritis  No aphthous ulcers   No arthritis   No erythema nodosum/pyoderma gangrenosum.     PERTINENT PAST MEDICAL HISTORY:  Past Medical History:   Diagnosis Date     Ulcerative colitis (H)        PREVIOUS SURGERIES:  No past surgical history on file.    PREVIOUS ENDOSCOPY:  Care Everywhere    ALLERGIES:     Allergies   Allergen Reactions     Infliximab Nausea and Other (See Comments)     FACE FLUSHING WITH ITCHY/SCRATCHY THROAT.        PERTINENT MEDICATIONS:    Current Outpatient Medications:      adalimumab (HUMIRA *CF* PEN) 40 MG/0.4ML pen kit, Inject 0.4 mLs (40 mg) Subcutaneous every 7 days, Disp: 4 each, Rfl: 5     azaTHIOprine (IMURAN) 50 MG tablet, Take 3 tablets (150 mg) by mouth daily, Disp: 90 tablet, Rfl: 5     UCERIS 2 MG/ACT FOAM, , Disp: , Rfl:     SOCIAL HISTORY:  Social History     Socioeconomic History     Marital status: Single     Spouse name: Not on file     Number of children: Not on file     Years of education: Not on file     Highest education level: Not on file   Occupational History     Not on file   Social Needs     Financial resource strain: Not on file     Food insecurity     Worry: Not on file     Inability: Not on file     Transportation needs     Medical: Not on file     Non-medical: Not on file   Tobacco Use     Smoking status: Never Smoker     Smokeless tobacco: Never  Used   Substance and Sexual Activity     Alcohol use: Not Currently     Drug use: Not Currently     Sexual activity: Not on file   Lifestyle     Physical activity     Days per week: Not on file     Minutes per session: Not on file     Stress: Not on file   Relationships     Social connections     Talks on phone: Not on file     Gets together: Not on file     Attends Sabianist service: Not on file     Active member of club or organization: Not on file     Attends meetings of clubs or organizations: Not on file     Relationship status: Not on file     Intimate partner violence     Fear of current or ex partner: Not on file     Emotionally abused: Not on file     Physically abused: Not on file     Forced sexual activity: Not on file   Other Topics Concern     Not on file   Social History Narrative     Not on file       FAMILY HISTORY:  Medical History Relation Name Comments   Diabetes Father       High Cholesterol Father       Sleep Apnea Father       Heart Disease Maternal Grandfather       Cancer (not otherwise listed) Maternal Grandmother       Hypertension Mother       Diabetes Paternal Grandfather       Heart Disease Paternal Grandfather         Past/family/social history reviewed and no changes    PHYSICAL EXAMINATION:  Deferred on telephone visit.       PERTINENT STUDIES:  Most recent CBC:  Recent Labs   Lab Test 11/15/19  1331   WBC 5.7   HGB 10.1*   HCT 31.6*        Most recent hepatic panel:  Recent Labs   Lab Test 11/15/19  1331   ALT 11   AST 7     Most recent creatinine:  No lab results found.      Again, thank you for allowing me to participate in the care of your patient.  Sincerely,    Theodore Moeller MD

## 2020-08-24 NOTE — PATIENT INSTRUCTIONS
It was great to see you today, you are doing fantastic.  Please continue the Humira every week and the azathioprine daily.    Typically would do a flexible sigmoidoscopy, or may need colonoscopy, to make sure that your colon is healing on the inside.  However given the coronavirus as well as your distance from [facility], we will hold on that at this time.    Instead we will check a stool test for inflammation that you have had before called a fecal lactoferrin.    Otherwise you will be due for blood work again in 6 months.  Please follow-up with myself or Camilo Rodriguez in the IBD program in 6 months.

## 2020-08-24 NOTE — LETTER
8/24/2020         RE: Poonam Chung  [address]  [facility] [address]        Dear Colleague,    Thank you for referring your patient, Poonam Chung, to the [facility] IBD [facility]. Please see a copy of my visit note below.    IBD CLINIC VISIT     CC/REFERRING MD:  Brandt Madrigal  REASON FOR CONSULTATION: Ulcerative colitis    ASSESSMENT/PLAN  24 year old female with ulcerative colitis.     1. UC: Currently she is in clinical remission on adalimumab every week and azathioprine 50 mg once a day to prevent antibody formation.  Discussed that ideally we would want to perform flexible sigmoidoscopy to assess for mucosal response.  Given the coronavirus pandemic, as well as her distance from the [facility], it is not feasible to do this at this time.  We will instead have her get a fecal quantitative lactoferrin as a surrogate biomarker.    We discussed the importance of remaining on the patient's current immunosuppressive therapy.  Discussed that the risks of coronavirus on these medications were small.  Discussed that should medications be interrupted, there could be a flare of the underlying inflammatory bowel disease which could require prednisone or hospitalization, both of which would likely increase the risk of coronavirus beyond that of the current medications.  Also discussed that should we enter a medications there is no guarantee that they would be effective again.  Finally we discussed the importance of hand hygiene, social isolation, and following all routine recommendations from the CDC.     -- Continue adalimumab weekly and azathiropine daily   -- Fecal lactoferrin   -- Blood work in 6 months    2.  C. difficile infection: Check C diff if new symptoms.     3. Iron deficient anemia: Anemic with normal MCV. Likely anemia related to UC.   -- Re-check CBC  -- Continue oral iron.   -- Consider re-transfusion of iron if Hb < 9.     4. Oral ulcers: Resolved at this time, suspect related to  stress in school and not azathioprine.    IBD HISTORY  Age at diagnosis: 20  Extent of disease: pancolitis  Current UC medications:  - Adalimumab every week (started Nov 2018, dose escalated Nov 2019)  - Azathioprine 50mg (sepetmebr 2018)  Prior UC surgeries:  Prior IBD Medications:  - Azathioprine 150mg --> flu like illness  - Canasa  - Infliximab 5mg/kg (400mg) q6    DRUG MONITORING  TPMT enzyme activity:     6-TGN/6-MMPN levels:  7/15620: 6-TGN: 137, 6MMPN 3888    Biologic concentration:  7/3/18: IFX: <1, Ab 30 (normal < 50) - Rocha test (q8 weeks, monotherapy)  11/5/18: IFX: Not detected, antibodies: 14    11/15/19: Adalimumab: 3.2, no antibodies     DISEASE ASSESSMENT  Labs:  Recent Labs   Lab Test 11/15/19  1331   CRP 23.5*   SED 95*     Endoscopic assessment: 2017: normal TI, inflam polyps in ascending and transverse. eMayo 1 inflammation in distal sigmoid and rectum  Enterography: Normal small bowel (3/23/16)  Fecal calprotectin: --  C diff: Positive August 2018  pMayo score: 0/9 (on steroids)    IBD Health Care Maintenance:    Vaccinations:  All patients on biologics should avoid live vaccines.    -- Influenza (every year)  -- TdaP (every 10 years)  -- Pneumococcal Pneumonia (once plus booster at 5 years)  -- Yearly assessment for latent Tb (verbal screening and exam, PPD or QuantiFERON-Tb testing)    One time confirmation of immunity or serologies:  -- Hepatitis A (serologies or immunizations)  -- Hepatitis B (serologies or immunizations)  -- Varicella  -- MMR  -- HPV (all aged 18-26)  -- Meningococcal meningitis (all patients at risk for meningitis)   Due to the immunosuppression in this patient, I would not advise administration of live vaccines such as varicella/VZV, intranasal influenza, MMR, or yellow fever vaccine (if travelling).      Bone mineral density screening   -- Recommend all patients supplement with calcium and vitamin D  -- Given steroid use recommend DEXA if not already done    Cancer  Screening:  Colon cancer screening:  Given pancolitis, recommend patient undergo regular dysplasia surveillance   Next dysplasia screening is recommended .    Cervical cancer screening: Annual due to immunosupression    Skin cancer screening: Annual visual exam of skin by dermatologist since patient is immunocompromised    Depression Screening:  PHQ-2 Score:     PHQ-2 (  [employer]) 11/15/2019 2018   Q1: Little interest or pleasure in doing things 0 0   Q2: Feeling down, depressed or hopeless 0 0   PHQ-2 Score 0 0   Q1: Little interest or pleasure in doing things Not at all Not at all   Q2: Feeling down, depressed or hopeless Not at all Not at all   PHQ-2 Score 0 0         Misc:  -- Avoid tobacco use  -- Avoid NSAIDs as there is potentially a 25% chance of causing an IBD flare    Return to clinic in 6 months    Thank you for this consultation.  It was a pleasure to participate in the care of this patient; please contact us with any further questions.     This note was created with voice recognition software, and while reviewed for accuracy, typos may remain.     Theodore Moeller MD   of Medicine  Inflammatory Bowel Disease Program   Division of Gastroenterology, Hepatology and Nutrition  [facility]          HPI:   Here today for routine follow-up.      Overall doing well.  Weekly humira has been very helpful.  Remains on azathioprine.      Cold sores have not been as bothersome.  Stress has decreased. But has not been an issue.     Currently having 1-2 stools a day.  Stools are formed and no blood.  No urgency. Otherwise no other abdominal pain, nausea vomiting or hearburn    No issues with C diff - no other antibiotics.     Works for [employer]. Is in her last semester of school. She is doing flex learning.     Rocha score:   Stool freq: 0 (baseline stools frequency)  Rectal bleedin (None)  PGA: 0 (normal)  Endoscopy: Not done in past 3 months    C diff testing:    12/1/16: negative  6/26/18: negative  8/5/18: positive  11/16/19: Negative    ROS:    No fevers or chills  No weight loss  No blurry vision, double vision or change in vision  No sore throat  No lymphadenopathy  No headache, paraesthesias, or weakness in a limb  No shortness of breath or wheezing  No chest pain or pressure  No arthralgias or myalgias  No rashes or skin changes  No odynophagia or dysphagia  No BRBPR, hematochezia, melena  No dysuria, frequency or urgency  No hot/cold intolerance or polyria  No anxiety or depression    Extra intestinal manifestations of IBD:  No uveitis/episcleritis  No aphthous ulcers   No arthritis   No erythema nodosum/pyoderma gangrenosum.     PERTINENT PAST MEDICAL HISTORY:  Past Medical History:   Diagnosis Date     Ulcerative colitis (H)        PREVIOUS SURGERIES:  No past surgical history on file.    PREVIOUS ENDOSCOPY:  Care Everywhere    ALLERGIES:     Allergies   Allergen Reactions     Infliximab Nausea and Other (See Comments)     FACE FLUSHING WITH ITCHY/SCRATCHY THROAT.        PERTINENT MEDICATIONS:    Current Outpatient Medications:      adalimumab (HUMIRA *CF* PEN) 40 MG/0.4ML pen kit, Inject 0.4 mLs (40 mg) Subcutaneous every 7 days, Disp: 4 each, Rfl: 5     azaTHIOprine (IMURAN) 50 MG tablet, Take 3 tablets (150 mg) by mouth daily, Disp: 90 tablet, Rfl: 5     UCERIS 2 MG/ACT FOAM, , Disp: , Rfl:     SOCIAL HISTORY:  Social History     Socioeconomic History     Marital status: Single     Spouse name: Not on file     Number of children: Not on file     Years of education: Not on file     Highest education level: Not on file   Occupational History     Not on file   Social Needs     Financial resource strain: Not on file     Food insecurity     Worry: Not on file     Inability: Not on file     Transportation needs     Medical: Not on file     Non-medical: Not on file   Tobacco Use     Smoking status: Never Smoker     Smokeless tobacco: Never Used   Substance and Sexual  "Activity     Alcohol use: Not Currently     Drug use: Not Currently     Sexual activity: Not on file   Lifestyle     Physical activity     Days per week: Not on file     Minutes per session: Not on file     Stress: Not on file   Relationships     Social connections     Talks on phone: Not on file     Gets together: Not on file     Attends Holiness service: Not on file     Active member of club or organization: Not on file     Attends meetings of clubs or organizations: Not on file     Relationship status: Not on file     Intimate partner violence     Fear of current or ex partner: Not on file     Emotionally abused: Not on file     Physically abused: Not on file     Forced sexual activity: Not on file   Other Topics Concern     Not on file   Social History Narrative     Not on file       FAMILY HISTORY:  Medical History Relation Name Comments   Diabetes Father       High Cholesterol Father       Sleep Apnea Father       Heart Disease Maternal Grandfather       Cancer (not otherwise listed) Maternal Grandmother       Hypertension Mother       Diabetes Paternal Grandfather       Heart Disease Paternal Grandfather         Past/family/social history reviewed and no changes    PHYSICAL EXAMINATION:  Deferred on telephone visit.       PERTINENT STUDIES:  Most recent CBC:  Recent Labs   Lab Test 11/15/19  1331   WBC 5.7   HGB 10.1*   HCT 31.6*        Most recent hepatic panel:  Recent Labs   Lab Test 11/15/19  1331   ALT 11   AST 7     Most recent creatinine:  No lab results found.          Poonam Chung is a 24 year old female who is being evaluated via a billable video visit.      The patient has been notified of following:     \"This video visit will be conducted via a call between you and your physician/provider. We have found that certain health care needs can be provided without the need for an in-person physical exam.  This service lets us provide the care you need with a video conversation.  If a " "prescription is necessary we can send it directly to your pharmacy.  If lab work is needed we can place an order for that and you can then stop by our lab to have the test done at a later time.    Video visits are billed at different rates depending on your insurance coverage.  Please reach out to your insurance provider with any questions.    If during the course of the call the physician/provider feels a video visit is not appropriate, you will not be charged for this service.\"    Patient has given verbal consent for Video visit? Yes  How would you like to obtain your AVS? MyChart  If you are dropped from the video visit, the video invite should be resent to: Send to e-mail at: [email or web address]  Will anyone else be joining your video visit? No      During this virtual visit the patient is located in [place], patient verifies this as the location during the entirety of this visit.       Video-Visit Details    Type of service:  Video Visit    Video Start Time: 2:25 PM  Video End Time: 2:45 PM    Originating Location (pt. Location): Home    Distant Location (provider location):  [facility]STROENTEROLOG[facility] IBD [facility]     Platform used for Video Visit: [facility]    Theodore Moeller MD          Again, thank you for allowing me to participate in the care of your patient.        Sincerely,        Theodore Moeller MD    "

## 2020-08-24 NOTE — NURSING NOTE
"Chief Complaint   Patient presents with     RECHECK     F/U for Ulcerative Pancolitis       Vitals:    08/24/20 1412   Weight: 74.8 kg (165 lb)   Height: 1.778 m (5' 10\")       Body mass index is 23.68 kg/m .      Jeannie Goodson LPN                          "

## 2020-08-24 NOTE — PROGRESS NOTES
"Poonam Chung is a 24 year old female who is being evaluated via a billable video visit.      The patient has been notified of following:     \"This video visit will be conducted via a call between you and your physician/provider. We have found that certain health care needs can be provided without the need for an in-person physical exam.  This service lets us provide the care you need with a video conversation.  If a prescription is necessary we can send it directly to your pharmacy.  If lab work is needed we can place an order for that and you can then stop by our lab to have the test done at a later time.    Video visits are billed at different rates depending on your insurance coverage.  Please reach out to your insurance provider with any questions.    If during the course of the call the physician/provider feels a video visit is not appropriate, you will not be charged for this service.\"    Patient has given verbal consent for Video visit? Yes  How would you like to obtain your AVS? MyChart  If you are dropped from the video visit, the video invite should be resent to: Send to e-mail at: [email or web address]  Will anyone else be joining your video visit? No      During this virtual visit the patient is located in [place], patient verifies this as the location during the entirety of this visit.       Video-Visit Details    Type of service:  Video Visit    Video Start Time: 2:25 PM  Video End Time: 2:45 PM    Originating Location (pt. Location): Home    Distant Location (provider location):  [facility] IBD [facility]     Platform used for Video Visit: Alaina Moeller MD        "

## 2020-08-24 NOTE — PROGRESS NOTES
IBD CLINIC VISIT     CC/REFERRING MD:  Brandt Madrigal  REASON FOR CONSULTATION: Ulcerative colitis    ASSESSMENT/PLAN  24 year old female with ulcerative colitis.     1. UC: Currently she is in clinical remission on adalimumab every week and azathioprine 50 mg once a day to prevent antibody formation.  Discussed that ideally we would want to perform flexible sigmoidoscopy to assess for mucosal response.  Given the coronavirus pandemic, as well as her distance from the [facility], it is not feasible to do this at this time.  We will instead have her get a fecal quantitative lactoferrin as a surrogate biomarker.    We discussed the importance of remaining on the patient's current immunosuppressive therapy.  Discussed that the risks of coronavirus on these medications were small.  Discussed that should medications be interrupted, there could be a flare of the underlying inflammatory bowel disease which could require prednisone or hospitalization, both of which would likely increase the risk of coronavirus beyond that of the current medications.  Also discussed that should we enter a medications there is no guarantee that they would be effective again.  Finally we discussed the importance of hand hygiene, social isolation, and following all routine recommendations from the CDC.     -- Continue adalimumab weekly and azathiropine daily   -- Fecal lactoferrin   -- Blood work in 6 months    2.  C. difficile infection: Check C diff if new symptoms.     3. Iron deficient anemia: Anemic with normal MCV. Likely anemia related to UC.   -- Re-check CBC  -- Continue oral iron.   -- Consider re-transfusion of iron if Hb < 9.     4. Oral ulcers: Resolved at this time, suspect related to stress in school and not azathioprine.    IBD HISTORY  Age at diagnosis: 20  Extent of disease: pancolitis  Current UC medications:  - Adalimumab every week (started Nov 2018, dose escalated Nov 2019)  - Azathioprine 50mg (sepetmebr 2018)  Prior UC  surgeries:  Prior IBD Medications:  - Azathioprine 150mg --> flu like illness  - Canasa  - Infliximab 5mg/kg (400mg) q6    DRUG MONITORING  TPMT enzyme activity:     6-TGN/6-MMPN levels:  7/15620: 6-TGN: 137, 6MMPN 3888    Biologic concentration:  7/3/18: IFX: <1, Ab 30 (normal < 50) - Rocha test (q8 weeks, monotherapy)  11/5/18: IFX: Not detected, antibodies: 14    11/15/19: Adalimumab: 3.2, no antibodies     DISEASE ASSESSMENT  Labs:  Recent Labs   Lab Test 11/15/19  1331   CRP 23.5*   SED 95*     Endoscopic assessment: 2017: normal TI, inflam polyps in ascending and transverse. eMayo 1 inflammation in distal sigmoid and rectum  Enterography: Normal small bowel (3/23/16)  Fecal calprotectin: --  C diff: Positive August 2018  pMayo score: 0/9 (on steroids)    IBD Health Care Maintenance:    Vaccinations:  All patients on biologics should avoid live vaccines.    -- Influenza (every year)  -- TdaP (every 10 years)  -- Pneumococcal Pneumonia (once plus booster at 5 years)  -- Yearly assessment for latent Tb (verbal screening and exam, PPD or QuantiFERON-Tb testing)    One time confirmation of immunity or serologies:  -- Hepatitis A (serologies or immunizations)  -- Hepatitis B (serologies or immunizations)  -- Varicella  -- MMR  -- HPV (all aged 18-26)  -- Meningococcal meningitis (all patients at risk for meningitis)   Due to the immunosuppression in this patient, I would not advise administration of live vaccines such as varicella/VZV, intranasal influenza, MMR, or yellow fever vaccine (if travelling).      Bone mineral density screening   -- Recommend all patients supplement with calcium and vitamin D  -- Given steroid use recommend DEXA if not already done    Cancer Screening:  Colon cancer screening:  Given pancolitis, recommend patient undergo regular dysplasia surveillance   Next dysplasia screening is recommended 2023.    Cervical cancer screening: Annual due to immunosupression    Skin cancer screening: Annual  visual exam of skin by dermatologist since patient is immunocompromised    Depression Screening:  PHQ-2 Score:     PHQ-2 (  [employer]) 11/15/2019 2018   Q1: Little interest or pleasure in doing things 0 0   Q2: Feeling down, depressed or hopeless 0 0   PHQ-2 Score 0 0   Q1: Little interest or pleasure in doing things Not at all Not at all   Q2: Feeling down, depressed or hopeless Not at all Not at all   PHQ-2 Score 0 0         Misc:  -- Avoid tobacco use  -- Avoid NSAIDs as there is potentially a 25% chance of causing an IBD flare    Return to clinic in 6 months    Thank you for this consultation.  It was a pleasure to participate in the care of this patient; please contact us with any further questions.     This note was created with voice recognition software, and while reviewed for accuracy, typos may remain.     Theodore Moeller MD   of Medicine  Inflammatory Bowel Disease Program   Division of Gastroenterology, Hepatology and Nutrition  [facility]          HPI:   Here today for routine follow-up.      Overall doing well.  Weekly humira has been very helpful.  Remains on azathioprine.      Cold sores have not been as bothersome.  Stress has decreased. But has not been an issue.     Currently having 1-2 stools a day.  Stools are formed and no blood.  No urgency. Otherwise no other abdominal pain, nausea vomiting or hearburn    No issues with C diff - no other antibiotics.     Works for [employer]. Is in her last semester of school. She is doing flex learning.     Rocha score:   Stool freq: 0 (baseline stools frequency)  Rectal bleedin (None)  PGA: 0 (normal)  Endoscopy: Not done in past 3 months    C diff testin/1/16: negative  18: negative  18: positive  19: Negative    ROS:    No fevers or chills  No weight loss  No blurry vision, double vision or change in vision  No sore throat  No lymphadenopathy  No headache, paraesthesias, or weakness in a  limb  No shortness of breath or wheezing  No chest pain or pressure  No arthralgias or myalgias  No rashes or skin changes  No odynophagia or dysphagia  No BRBPR, hematochezia, melena  No dysuria, frequency or urgency  No hot/cold intolerance or polyria  No anxiety or depression    Extra intestinal manifestations of IBD:  No uveitis/episcleritis  No aphthous ulcers   No arthritis   No erythema nodosum/pyoderma gangrenosum.     PERTINENT PAST MEDICAL HISTORY:  Past Medical History:   Diagnosis Date     Ulcerative colitis (H)        PREVIOUS SURGERIES:  No past surgical history on file.    PREVIOUS ENDOSCOPY:  Care Everywhere    ALLERGIES:     Allergies   Allergen Reactions     Infliximab Nausea and Other (See Comments)     FACE FLUSHING WITH ITCHY/SCRATCHY THROAT.        PERTINENT MEDICATIONS:    Current Outpatient Medications:      adalimumab (HUMIRA *CF* PEN) 40 MG/0.4ML pen kit, Inject 0.4 mLs (40 mg) Subcutaneous every 7 days, Disp: 4 each, Rfl: 5     azaTHIOprine (IMURAN) 50 MG tablet, Take 3 tablets (150 mg) by mouth daily, Disp: 90 tablet, Rfl: 5     UCERIS 2 MG/ACT FOAM, , Disp: , Rfl:     SOCIAL HISTORY:  Social History     Socioeconomic History     Marital status: Single     Spouse name: Not on file     Number of children: Not on file     Years of education: Not on file     Highest education level: Not on file   Occupational History     Not on file   Social Needs     Financial resource strain: Not on file     Food insecurity     Worry: Not on file     Inability: Not on file     Transportation needs     Medical: Not on file     Non-medical: Not on file   Tobacco Use     Smoking status: Never Smoker     Smokeless tobacco: Never Used   Substance and Sexual Activity     Alcohol use: Not Currently     Drug use: Not Currently     Sexual activity: Not on file   Lifestyle     Physical activity     Days per week: Not on file     Minutes per session: Not on file     Stress: Not on file   Relationships     Social  connections     Talks on phone: Not on file     Gets together: Not on file     Attends [employer] service: Not on file     Active member of club or organization: Not on file     Attends meetings of clubs or organizations: Not on file     Relationship status: Not on file     Intimate partner violence     Fear of current or ex partner: Not on file     Emotionally abused: Not on file     Physically abused: Not on file     Forced sexual activity: Not on file   Other Topics Concern     Not on file   Social History Narrative     Not on file       FAMILY HISTORY:  Medical History Relation Name Comments   Diabetes Father       High Cholesterol Father       Sleep Apnea Father       Heart Disease Maternal Grandfather       Cancer (not otherwise listed) Maternal Grandmother       Hypertension Mother       Diabetes Paternal Grandfather       Heart Disease Paternal Grandfather         Past/family/social history reviewed and no changes    PHYSICAL EXAMINATION:  Deferred on telephone visit.       PERTINENT STUDIES:  Most recent CBC:  Recent Labs   Lab Test 11/15/19  1331   WBC 5.7   HGB 10.1*   HCT 31.6*        Most recent hepatic panel:  Recent Labs   Lab Test 11/15/19  1331   ALT 11   AST 7     Most recent creatinine:  No lab results found.

## 2020-08-27 ENCOUNTER — PATIENT OUTREACH (OUTPATIENT)
Dept: GASTROENTEROLOGY | Facility: CLINIC | Age: 25
End: 2020-08-27

## 2020-08-27 DIAGNOSIS — K51.00 ULCERATIVE PANCOLITIS (H): Primary | ICD-10-CM

## 2020-08-27 NOTE — PROGRESS NOTES
Contacted  patient to confirm where she will complete her stool study.   [facility] faxed to lab at [phone]   left number to call to schedule 6 month followu

## 2020-09-10 ENCOUNTER — PATIENT OUTREACH (OUTPATIENT)
Dept: GASTROENTEROLOGY | Facility: CLINIC | Age: 25
End: 2020-09-10

## 2020-09-10 DIAGNOSIS — K51.00 CHRONIC UNIVERSAL ULCERATIVE COLITIS (H): ICD-10-CM

## 2020-09-10 RX ORDER — ADALIMUMAB 40MG/0.4ML
40 KIT SUBCUTANEOUS
Qty: 4 EACH | Refills: 5 | Status: SHIPPED | OUTPATIENT
Start: 2020-09-10 | End: 2020-12-02

## 2020-09-10 NOTE — PROGRESS NOTES
Patient  had left a message that she needed a refill of humira.   Left a message for patient sent to [facility]   Also if could have labs in the near future.

## 2020-09-14 RX ORDER — ADALIMUMAB 40MG/0.4ML
KIT SUBCUTANEOUS
Refills: 5 | OUTPATIENT
Start: 2020-09-14

## 2020-09-14 NOTE — TELEPHONE ENCOUNTER
adalimumab (HUMIRA CITRATE-FREE) 40 mg/0.4 mL subcutaneous injection (pen)   adalimumab (HUMIRA *CF* PEN) 40 MG/0.4ML pen kit  4 each  5  9/10/2020   No    Sig - Route: Inject 0.4 mLs (40 mg) Subcutaneous every 7 days - Subcutaneous    Sent to pharmacy as: Humira Pen 40 MG/0.4ML Subcutaneous Pen-injector Kit (adalimumab)    Class: E-Prescribe    Order: [id number]    E-Prescribing Status: Receipt confirmed by pharmacy (9/10/2020 12:39 PM CDT)    Printout Tracking     External Result Report    Pharmacy     [facility] [address], [place] - [address]

## 2020-12-02 ENCOUNTER — PATIENT OUTREACH (OUTPATIENT)
Dept: GASTROENTEROLOGY | Facility: CLINIC | Age: 25
End: 2020-12-02

## 2020-12-02 DIAGNOSIS — K51.00 CHRONIC UNIVERSAL ULCERATIVE COLITIS (H): ICD-10-CM

## 2020-12-02 RX ORDER — ADALIMUMAB 40MG/0.4ML
40 KIT SUBCUTANEOUS
Qty: 4 EACH | Refills: 5 | Status: SHIPPED | OUTPATIENT
Start: 2020-12-02 | End: 2021-02-24

## 2020-12-02 NOTE — PROGRESS NOTES
Patient called to state she called to have her humira refilled and told she needs new prior  Contacted [facility] and confirmed needs new prior .   Added new prescription as long as prior needed.

## 2020-12-03 ENCOUNTER — TELEPHONE (OUTPATIENT)
Dept: GASTROENTEROLOGY | Facility: CLINIC | Age: 25
End: 2020-12-03

## 2020-12-03 NOTE — TELEPHONE ENCOUNTER
PA Initiation    Medication: HUMIRA WEEKLY   Insurance Company: [facility] - Phone [phone] Fax [phone]  Pharmacy Filling the Rx: [facility] [address]  Filling Pharmacy Phone:    Filling Pharmacy Fax:    Start Date: 12/3/2020    CIPRIANO CRUZ ([place]: [id number])

## 2021-01-03 ENCOUNTER — HEALTH MAINTENANCE LETTER (OUTPATIENT)
Age: 26
End: 2021-01-03

## 2021-01-04 NOTE — TELEPHONE ENCOUNTER
Prior Authorization Approval    Authorization Effective Date:  unknown  Authorization Expiration Date:  unknown  Medication: HUMIRA WEEKLY - Approved   Approved Dose/Quantity:  4 for 28 days   Reference #:     Insurance Company: [employer] - Phone [phone] Fax [phone]  Expected CoPay:       CoPay Card Available:      Foundation Assistance Needed:    Which Pharmacy is filling the prescription (Not needed for infusion/clinic administered): [facility] [place], [place] - [address]  Pharmacy Notified: Yes  Patient Notified: Yes      Per test claim, patient received 4 pens on 01/02/2021

## 2021-01-22 ENCOUNTER — PATIENT OUTREACH (OUTPATIENT)
Dept: GASTROENTEROLOGY | Facility: CLINIC | Age: 26
End: 2021-01-22

## 2021-01-22 ENCOUNTER — DOCUMENTATION ONLY (OUTPATIENT)
Dept: GASTROENTEROLOGY | Facility: CLINIC | Age: 26
End: 2021-01-22

## 2021-01-22 DIAGNOSIS — K51.90 ULCERATIVE COLITIS (H): ICD-10-CM

## 2021-01-22 RX ORDER — AZATHIOPRINE 50 MG/1
150 TABLET ORAL DAILY
Qty: 90 TABLET | Refills: 3 | Status: SHIPPED | OUTPATIENT
Start: 2021-01-22 | End: 2021-06-14

## 2021-01-22 NOTE — PROGRESS NOTES
Patient called and requesting refill of azathioprine  Refill to [facility]   Pt overdue for labs   Will have drawn at [facility]  Fax number    Patient has virtual visit with Dr. Moeller in February

## 2021-01-22 NOTE — PROGRESS NOTES
Lab orders (Dr. Moeller, 1/22/21) faxed to [facility] at [phone].    Varun Knight LPN

## 2021-02-01 ENCOUNTER — VIRTUAL VISIT (OUTPATIENT)
Dept: GASTROENTEROLOGY | Facility: CLINIC | Age: 26
End: 2021-02-01
Payer: COMMERCIAL

## 2021-02-01 VITALS — BODY MASS INDEX: 23.62 KG/M2 | HEIGHT: 70 IN | WEIGHT: 165 LBS

## 2021-02-01 DIAGNOSIS — K51.00 ULCERATIVE PANCOLITIS (H): Primary | ICD-10-CM

## 2021-02-01 PROCEDURE — 99214 OFFICE O/P EST MOD 30 MIN: CPT | Mod: GC | Performed by: INTERNAL MEDICINE

## 2021-02-01 ASSESSMENT — MIFFLIN-ST. JEOR: SCORE: 1583.69

## 2021-02-01 NOTE — NURSING NOTE
"Chief Complaint   Patient presents with     Follow Up     follow up IBD       Vitals:    02/01/21 1236   Weight: 74.8 kg (165 lb)   Height: 1.778 m (5' 10\")       Body mass index is 23.68 kg/m .    Daniela Rios CMA    "

## 2021-02-01 NOTE — LETTER
2/1/2021       RE: Poonam Chung  [address] [facility] [address]      Dear Colleague,    Thank you for referring your patient, Poonam Chung, to the [facility] [place]. Please see a copy of my visit note below.    IBD CLINIC VISIT     CC/REFERRING MD:  Brandt Madrigal  REASON FOR CONSULTATION: Ulcerative colitis  FOLLOWS WITH: Dr. Moeller    ASSESSMENT/PLAN  25 year old female with ulcerative colitis.     1. Ulcerative pancolitis    Currently she is in clinical remission on adalimumab every week and azathioprine 50 mg once a day to prevent antibody formation. Fecal lactoferrin was mildly elevated in December at 80.9. She has not had a flexible sigmoidoscopy to assess disease response due to the COVID 19 pandemic since endoscopy in 2019 showed mild inflammation in the rectum. Overall, given clinical remission, will plan to continue current therapy.    We again discussed the importance of remaining on the patient's current immunosuppressive therapy during the COVID pandemic, and recommended she obtain the vaccine when available.    -- Continue adalimumab weekly and azathioprine 150 mg daily   -- Obtain routine monitoring blood work at her convenience (orders are in)  -- Repeat blood work in 3-4 months  -- Will be due for surveillance colonoscopy in 2023    2.  C. difficile infection:   Check C diff if new symptoms.     3. Iron deficient anemia:   Anemic with normal MCV at last lab check. Likely anemia related to UC.   -- Will be re-checking CBC with routine labs above  -- Consider re-transfusion of iron if Hb < 9.     4. Oral ulcers: Resolved at this time, suspect related to stress in school and not azathioprine.    IBD HISTORY  Age at diagnosis: 20  Extent of disease: pancolitis  Current UC medications:  - Adalimumab every week (started Nov 2018, dose escalated Nov 2019)  - Azathioprine 50mg (sepetmebr 2018)  Prior UC surgeries:  Prior IBD Medications:  - Azathioprine 150mg -->  flu like illness  - Canasa  - Infliximab 5mg/kg (400mg) q6    DRUG MONITORING  TPMT enzyme activity:     6-TGN/6-MMPN levels:  7/15620: 6-TGN: 137, 6MMPN 3888    Biologic concentration:  7/3/18: IFX: <1, Ab 30 (normal < 50) - Rocha test (q8 weeks, monotherapy)  11/5/18: IFX: Not detected, antibodies: 14    11/15/19: Adalimumab: 3.2, no antibodies     DISEASE ASSESSMENT  Labs:  Recent Labs   Lab Test 11/15/19  1331   CRP 23.5*   SED 95*     Endoscopic assessment: 2017: normal TI, inflam polyps in ascending and transverse. eMayo 1 inflammation in distal sigmoid and rectum  Enterography: Normal small bowel (3/23/16)  Fecal lactoferrin: 80.9 (12/2020)  C diff: Positive August 2018  pMayo score: 0/9 (on steroids)    IBD Health Care Maintenance:    Vaccinations:  All patients on biologics should avoid live vaccines.    -- Influenza (every year)  -- TdaP (every 10 years)  -- Pneumococcal Pneumonia (once plus booster at 5 years)  -- Yearly assessment for latent Tb (verbal screening and exam, PPD or QuantiFERON-Tb testing)    One time confirmation of immunity or serologies:  -- Hepatitis A (serologies or immunizations)  -- Hepatitis B (serologies or immunizations)  -- Varicella  -- MMR  -- HPV (all aged 18-26)  -- Meningococcal meningitis (all patients at risk for meningitis)   Due to the immunosuppression in this patient, I would not advise administration of live vaccines such as varicella/VZV, intranasal influenza, MMR, or yellow fever vaccine (if travelling).      Bone mineral density screening   -- Recommend all patients supplement with calcium and vitamin D  -- Given steroid use recommend DEXA if not already done    Cancer Screening:  Colon cancer screening:  Given pancolitis, recommend patient undergo regular dysplasia surveillance   Next dysplasia screening is recommended 2023.    Cervical cancer screening: Annual due to immunosupression    Skin cancer screening: Annual visual exam of skin by dermatologist since  patient is immunocompromised    Depression Screening:  PHQ-2 Score:     PHQ-2 (  [employer]) 2021 11/15/2019   Q1: Little interest or pleasure in doing things 0 0   Q2: Feeling down, depressed or hopeless 0 0   PHQ-2 Score 0 0   Q1: Little interest or pleasure in doing things - Not at all   Q2: Feeling down, depressed or hopeless - Not at all   PHQ-2 Score - 0         Misc:  -- Avoid tobacco use  -- Avoid NSAIDs as there is potentially a 25% chance of causing an IBD flare    Return to clinic in 6 months    Thank you for this consultation.  It was a pleasure to participate in the care of this patient; please contact us with any further questions.     This note was created with voice recognition software, and while reviewed for accuracy, typos may remain.     Tono Oshea MD  Gastroenterology Fellow, PGY-4  [phone]          HPI:     Here today for routine follow-up, last seen 2020 by Dr. Gotti.    Overall doing well, denies any symptoms related to her ulcerative colitis.  Weekly humira has been very helpful.  Remains on azathioprine.  Does not think she is having any adverse effects from these medications.    Currently having 1-3 stools a day, this is her baseline.  Stools are formed and no blood.  No urgency, tenesmus. Denies abdominal pain, nausea, vomiting.     Cold sores have resolved, thinks they were related to stress as they occurred at a particularly stressful time in her life.     Fecal lactoferrin was drawn in [place] and elevated at 80.9. Has not been able to schedule routing labs for monitoring, orders are in from Dr. Moeller to have these performed in [place].    Recently graduated from [employer]. Continuing to work as an  for [employer]. Completely work from home at this time.     Has not had any issues with COVID, is taking appropriate precautions.    Rocha score:   Stool freq: 0 (baseline stools frequency)  Rectal bleedin (None)  PGA: 0 (normal)  Endoscopy: Not done        C  diff testin/1/16: negative  18: negative  18: positive  19: Negative    ROS:    No fevers or chills  No weight loss  No blurry vision, double vision or change in vision  No sore throat  No lymphadenopathy  No headache, paraesthesias, or weakness in a limb  No shortness of breath or wheezing  No chest pain or pressure  No arthralgias or myalgias  No rashes or skin changes  No odynophagia or dysphagia  No BRBPR, hematochezia, melena  No dysuria, frequency or urgency  No hot/cold intolerance or polyria  No anxiety or depression    Extra intestinal manifestations of IBD:  No uveitis/episcleritis  No aphthous ulcers   No arthritis   No erythema nodosum/pyoderma gangrenosum.     PERTINENT PAST MEDICAL HISTORY:  Past Medical History:   Diagnosis Date     Ulcerative colitis (H)        PREVIOUS SURGERIES:  No past surgical history on file.    PREVIOUS ENDOSCOPY:  Care Everywhere    ALLERGIES:     Allergies   Allergen Reactions     Infliximab Nausea and Other (See Comments)     FACE FLUSHING WITH ITCHY/SCRATCHY THROAT.        PERTINENT MEDICATIONS:    Current Outpatient Medications:      adalimumab (HUMIRA *CF* PEN) 40 MG/0.4ML pen kit, Inject 0.4 mLs (40 mg) Subcutaneous every 7 days, Disp: 4 each, Rfl: 5     azaTHIOprine (IMURAN) 50 MG tablet, Take 3 tablets (150 mg) by mouth daily, Disp: 90 tablet, Rfl: 3     UCERIS 2 MG/ACT FOAM, , Disp: , Rfl:     SOCIAL HISTORY:  Social History     Socioeconomic History     Marital status: Single     Spouse name: Not on file     Number of children: Not on file     Years of education: Not on file     Highest education level: Not on file   Occupational History     Not on file   Social Needs     Financial resource strain: Not on file     Food insecurity     Worry: Not on file     Inability: Not on file     Transportation needs     Medical: Not on file     Non-medical: Not on file   Tobacco Use     Smoking status: Never Smoker     Smokeless tobacco: Never Used  "  Substance and Sexual Activity     Alcohol use: Not Currently     Drug use: Not Currently     Sexual activity: Not on file   Lifestyle     Physical activity     Days per week: Not on file     Minutes per session: Not on file     Stress: Not on file   Relationships     Social connections     Talks on phone: Not on file     Gets together: Not on file     Attends [employer] service: Not on file     Active member of club or organization: Not on file     Attends meetings of clubs or organizations: Not on file     Relationship status: Not on file     Intimate partner violence     Fear of current or ex partner: Not on file     Emotionally abused: Not on file     Physically abused: Not on file     Forced sexual activity: Not on file   Other Topics Concern     Not on file   Social History Narrative     Not on file       FAMILY HISTORY:  Medical History Relation Name Comments   Diabetes Father       High Cholesterol Father       Sleep Apnea Father       Heart Disease Maternal Grandfather       Cancer (not otherwise listed) Maternal Grandmother       Hypertension Mother       Diabetes Paternal Grandfather       Heart Disease Paternal Grandfather         Past/family/social history reviewed and no changes    PHYSICAL EXAMINATION:  Deferred on telephone visit.       PERTINENT STUDIES:  Most recent CBC:  Recent Labs   Lab Test 11/15/19  1331   WBC 5.7   HGB 10.1*   HCT 31.6*        Most recent hepatic panel:  Recent Labs   Lab Test 11/15/19  1331   ALT 11   AST 7     Most recent creatinine:  No lab results found.          Attestation signed by Theodore Moeller MD at 2/1/2021  2:35 PM:  Agree - see my separate note for attestation.   -Dr. Sajan LONGORIA Sheldon is a 25 year old female who is being evaluated via a billable video visit.      The patient has been notified of following:     \"This video visit will be conducted via a call between you and your physician/provider. We have found that certain health care needs " "can be provided without the need for an in-person physical exam.  This service lets us provide the care you need with a video conversation.  If a prescription is necessary we can send it directly to your pharmacy.  If lab work is needed we can place an order for that and you can then stop by our lab to have the test done at a later time.    If during the course of the call the physician/provider feels a video visit is not appropriate, you will not be charged for this service.\"     Patient confirmed that they are in [place] for today's visit yes.    If the video visit is dropped, please send link to:       Video-Visit Details  Type of service:  Video Visit    Video Start Time: 1:17 PM  Video End Time:  1:30 PM    Originating Location (pt. Location): Home    Distant Location (provider location):  [facility] [place]     Platform used: [employer]      Virtual visit for patient conducted via three way video conference with myself, patient and Dr. Oshea.   I reviewed the history and abbreviated physical exam and discussed the management with Dr. Oshea on 2/1/2021. I reviewed the note and there are no changes to the past medical, family or social history.  A complete 10 point review of systems was obtained. Please see the HPI for pertinent positives and negatives. All other systems were reviewed and were found to be negative. I agree with the documented findings and plan of care as outlined.    She is in clinical remission with minimal evidence of inflammation based on fecal calprotectin. She is on Humira q7 days and azathiopine 150mg daily. Will not make any changes at this time. Consider decreasing azathioprine dose in future. She did have antibodies to infliximab, so may always need low dose thiopurine.     Overdue for labs, and she will get those (ordered).     -Dr. Moeller          "

## 2021-02-01 NOTE — PATIENT INSTRUCTIONS
Overall you are doing great! Overall the stool test for inflammation was much better than last year.      -- Continue the Humira and Azathioprine for now    -- Remember to get your blood work every 3-4 months on these medications    -- Get the COVID vaccine whenever it is available to you.      We will have you see our IBD pharmacist in about 4-6 months to review all your vaccinations and healthcare maintenance, then Dr. Moeller again in about 4-6 months after that.     If you have any questions, please don't hesitate to contact the Gastroenterology nurse at [phone].     -Dr. Moeller

## 2021-02-01 NOTE — PROGRESS NOTES
"Poonam Chung is a 25 year old female who is being evaluated via a billable video visit.      The patient has been notified of following:     \"This video visit will be conducted via a call between you and your physician/provider. We have found that certain health care needs can be provided without the need for an in-person physical exam.  This service lets us provide the care you need with a video conversation.  If a prescription is necessary we can send it directly to your pharmacy.  If lab work is needed we can place an order for that and you can then stop by our lab to have the test done at a later time.    If during the course of the call the physician/provider feels a video visit is not appropriate, you will not be charged for this service.\"     Patient confirmed that they are in [place] for today's visit yes.    If the video visit is dropped, please send link to:       Video-Visit Details  Type of service:  Video Visit    Video Start Time: 1:17 PM  Video End Time:  1:30 PM    Originating Location (pt. Location): Home    Distant Location (provider location):  [facility] GASTROENTEROLOGY CLIN[facility] [place]     Platform used: [employer]      Virtual visit for patient conducted via three way video conference with myself, patient and Dr. Oshea.   I reviewed the history and abbreviated physical exam and discussed the management with Dr. Oshea on 2/1/2021. I reviewed the note and there are no changes to the past medical, family or social history.  A complete 10 point review of systems was obtained. Please see the HPI for pertinent positives and negatives. All other systems were reviewed and were found to be negative. I agree with the documented findings and plan of care as outlined.    She is in clinical remission with minimal evidence of inflammation based on fecal calprotectin. She is on Humira q7 days and azathiopine 150mg daily. Will not make any changes at this time. Consider decreasing azathioprine " dose in future. She did have antibodies to infliximab, so may always need low dose thiopurine.     Overdue for labs, and she will get those (ordered).     -Dr. Moeller

## 2021-02-15 ENCOUNTER — TRANSFERRED RECORDS (OUTPATIENT)
Dept: HEALTH INFORMATION MANAGEMENT | Facility: CLINIC | Age: 26
End: 2021-02-15

## 2021-02-22 DIAGNOSIS — K51.00 CHRONIC UNIVERSAL ULCERATIVE COLITIS (H): ICD-10-CM

## 2021-02-24 ENCOUNTER — PATIENT OUTREACH (OUTPATIENT)
Dept: GASTROENTEROLOGY | Facility: CLINIC | Age: 26
End: 2021-02-24

## 2021-02-24 DIAGNOSIS — K51.00 CHRONIC UNIVERSAL ULCERATIVE COLITIS (H): ICD-10-CM

## 2021-02-24 RX ORDER — ADALIMUMAB 40MG/0.4ML
KIT SUBCUTANEOUS
OUTPATIENT
Start: 2021-02-24

## 2021-02-24 RX ORDER — ADALIMUMAB 40MG/0.4ML
40 KIT SUBCUTANEOUS
Qty: 4 EACH | Refills: 5 | Status: SHIPPED | OUTPATIENT
Start: 2021-02-24 | End: 2021-08-17

## 2021-02-24 NOTE — PROGRESS NOTES
Returned voice mail from patient about humira  New order placed.   Pt now lives in [place] thus to [facility] [place]  Labs done at [place]   All labs within normal limits other than iron is low  Discussed taking iron supplement and also more iron rich diet  Has follow up in June with Dr. Moeller   Patient message about iron

## 2021-03-23 ENCOUNTER — TELEPHONE (OUTPATIENT)
Dept: GASTROENTEROLOGY | Facility: CLINIC | Age: 26
End: 2021-03-23

## 2021-03-23 ENCOUNTER — PATIENT OUTREACH (OUTPATIENT)
Dept: GASTROENTEROLOGY | Facility: CLINIC | Age: 26
End: 2021-03-23

## 2021-03-23 NOTE — CONFIDENTIAL NOTE
Patient had left message that needs a prior for her humira  Patient has changed insurance to her primary being [employer]  Secondary is [employer]  Ms Kunz will send an urgent prior to her new insurance  Patient will call the call center to update her insurance  Pt will reach out to her pharmacy late tomorrow or Thursday as due on Saturay for injection.

## 2021-03-23 NOTE — TELEPHONE ENCOUNTER
PA Initiation    Medication: HUMIRA WEEKLY [facility]  Insurance Company: [employer] [place] - Phone [phone] Fax [phone]  Pharmacy Filling the Rx: [facility] - [place], [place] - [address] [facility] [address]  Filling Pharmacy Phone:    Filling Pharmacy Fax:    Start Date: 3/23/2021     CIPRIANO CRUZ (Ho: [id number])

## 2021-03-24 NOTE — CONFIDENTIAL NOTE
Patient contacted clinic stating concerned that she will not receive her humira before next Monday   Patient had changed insurance and also had not let the pharmacy know that she had a co pay assistance card.   Discussed with pharmacist Lottie and will run through co pay assistance  Do not have in stock but will order and should be at pharmacy tomorrow   Will contact patient when it arrives  Updated patient.

## 2021-03-24 NOTE — TELEPHONE ENCOUNTER
Prior Authorization Approval    Authorization Effective Date: 3/23/2021  Authorization Expiration Date: 3/23/2022  Medication: HUMIRA WEEKLY [employer] - APPROVED   Approved Dose/Quantity:  4 FOR 28 DAYS   Reference #:     Insurance Company: [employer] [place] - Phone [phone] Fax [phone]  Expected CoPay:       CoPay Card Available:      Foundation Assistance Needed:    Which Pharmacy is filling the prescription (Not needed for infusion/clinic administered): [facility] [place] [place] - [address]  Pharmacy Notified: Yes  Patient Notified:

## 2021-04-25 ENCOUNTER — HEALTH MAINTENANCE LETTER (OUTPATIENT)
Age: 26
End: 2021-04-25

## 2021-06-07 ENCOUNTER — VIRTUAL VISIT (OUTPATIENT)
Dept: GASTROENTEROLOGY | Facility: CLINIC | Age: 26
End: 2021-06-07
Payer: COMMERCIAL

## 2021-06-07 VITALS — BODY MASS INDEX: 23.62 KG/M2 | WEIGHT: 165 LBS | HEIGHT: 70 IN

## 2021-06-07 DIAGNOSIS — K51.00 ULCERATIVE PANCOLITIS WITHOUT COMPLICATION (H): Primary | ICD-10-CM

## 2021-06-07 PROCEDURE — 99214 OFFICE O/P EST MOD 30 MIN: CPT | Mod: GC | Performed by: INTERNAL MEDICINE

## 2021-06-07 ASSESSMENT — MIFFLIN-ST. JEOR: SCORE: 1583.69

## 2021-06-07 NOTE — NURSING NOTE
"Chief Complaint   Patient presents with     Follow Up       Vitals:    06/07/21 1249   Weight: 74.8 kg (165 lb)   Height: 1.778 m (5' 10\")       Body mass index is 23.68 kg/m .    Daniela Rios CMA    "

## 2021-06-07 NOTE — PROGRESS NOTES
IBD CLINIC VISIT         ASSESSMENT/PLAN  26 yo F with Ulcerative pancolitis on Humira and Azathioprine.     1. Ulcerative colitis:   Patient has ulcerative colitis which is in clinical remission.  Patient is on weekly Humira as well as azathioprine, last disease endoscopic assessment was in 2019 upon which she had cage score 2.  Her recent ESR CRP not elevated but to lactoferrin was elevated in December.  We will check fecal calprotectin, if it comes elevated plan to perform flex sig my endoscopy for disease assessment.  Given her iron deficiency, plan to repeat iron levels as well as checking celiac labs to rule out celiac disease.    -- Plan to check fecal calprotectin, if elevated plan for flex sigmoidoscopy for disease assessment  -- Checking iron panel and TTG/DGP to r/o celiac disease  -- Continue Humira weekly and Azathioprine 150mg daily  -- Surveillance colonoscopy in 2023    2. C Diff infection:     Continue to check C. difficile testing if she develops diarrhea.    3. Iron deficiency anemia  Patient is taking over-the-counter multivitamin pills, we will check repeat iron levels as well asTTG Ab/ DGP to rule out celiac disease as cause of iron deficiency anemia.  4. Oral ulcers  No current complaints for oral ulcers at this point.  Continue to monitor.    Return to clinic in 6  months    Thank you for this consultation.  It was a pleasure to participate in the care of this patient; please contact us with any further questions.  I spent a total of 35 minutes, face to face, was spent with this patient, >50% of which was counseling regarding the above delineated issues.    This note was created with voice recognition software, and while reviewed for accuracy, typos may remain.     Zulema Roman MD  GI Fellow  Division of Gastroenterology, Hepatology and Nutrition  [facility]      IBD HISTORY  Age at diagnosis: 20  Extent of disease: pancolitis  Current UC medications:  - Adalimumab every week  (started 2018, dose escalated 2019)  - Azathioprine 50mg (2018)  Prior UC surgeries:  Prior IBD Medications:  - Azathioprine 150mg --> flu like illness  - Canasa  - Infliximab 5mg/kg (400mg) q6     DRUG MONITORING  TPMT enzyme activity:      6-TGN/6-MMPN levels:  : 6-TGN: 137, 6MMPN 3888     Biologic concentration:  7/3/18: IFX: <1, Ab 30 (normal < 50) - Rocha test (q8 weeks, monotherapy)  18: IFX: Not detected, antibodies: 14     11/15/19: Adalimumab: 3.2, no antibodies     HPI:     25-year-old female who had a history of ulcerative pancolitis, diagnosed at the age of 20, now on Humira weekly as well as azathioprine with disease in clinical remission.  Patient stated that she is having 1-2 formed bowel movements every day no abdominal cramps or hematochezia.  No nocturnal symptoms.  Her last Humira shot was yesterday.  She received her second dose of Covid vaccination.  Patient is taking multivitamin pills with iron for iron replacement.    No smoking or alcohol intake no NSAID use.  Works for [employer].  Plans for outdoor activities this summer.    Rocha score:   Stool freq: 0 (baseline stools frequency)  Rectal bleedin (None)  PGA: 0 (normal)  Endoscopy: 2 (Moderate)    Extra intestinal manifestations of IBD:  No uveitis/episcleritis  Yes: aphthous ulcers   No arthritis   No erythema nodosum/pyoderma gangrenosum.     sIBDQ:  IBDQ Score Date IBDQ - Total Score  (Higher score better)   11/15/2019 57        Last endoscopic activity:  Colonoscopy 2019:  Moderate inflammation in distal rectum approx 2cm with eMayo score 2, diffuse pseudopolyps throughout the colon.  Pertinent labs / imaging:   ESR 10, CRP 2.8 in 2021      ROS:    Constitutional, HEENT, cardiovascular, pulmonary, GI, , musculoskeletal, neuro, skin, endocrine and psych systems are negative, except as otherwise noted.    PHYSICAL EXAMINATION:  Not performed    PERTINENT PAST MEDICAL HISTORY:  Past Medical  History:   Diagnosis Date     Ulcerative colitis (H)        PREVIOUS SURGERIES:  No past surgical history on file.    ALLERGIES:     Allergies   Allergen Reactions     Infliximab Nausea and Other (See Comments)     FACE FLUSHING WITH ITCHY/SCRATCHY THROAT.        PERTINENT MEDICATIONS:    Current Outpatient Medications:      adalimumab (HUMIRA *CF* PEN) 40 MG/0.4ML pen kit, Inject 0.4 mLs (40 mg) Subcutaneous every 7 days, Disp: 4 each, Rfl: 5     azaTHIOprine (IMURAN) 50 MG tablet, Take 3 tablets (150 mg) by mouth daily, Disp: 90 tablet, Rfl: 3     UCERIS 2 MG/ACT FOAM, , Disp: , Rfl:     SOCIAL HISTORY:  Social History     Socioeconomic History     Marital status: Single     Spouse name: Not on file     Number of children: Not on file     Years of education: Not on file     Highest education level: Not on file   Occupational History     Not on file   Social Needs     Financial resource strain: Not on file     Food insecurity     Worry: Not on file     Inability: Not on file     Transportation needs     Medical: Not on file     Non-medical: Not on file   Tobacco Use     Smoking status: Never Smoker     Smokeless tobacco: Never Used   Substance and Sexual Activity     Alcohol use: Not Currently     Drug use: Not Currently     Sexual activity: Not on file   Lifestyle     Physical activity     Days per week: Not on file     Minutes per session: Not on file     Stress: Not on file   Relationships     Social connections     Talks on phone: Not on file     Gets together: Not on file     Attends Episcopal service: Not on file     Active member of club or organization: Not on file     Attends meetings of clubs or organizations: Not on file     Relationship status: Not on file     Intimate partner violence     Fear of current or ex partner: Not on file     Emotionally abused: Not on file     Physically abused: Not on file     Forced sexual activity: Not on file   Other Topics Concern     Not on file   Social History  Narrative     Not on file       FAMILY HISTORY:  No family history on file.    Past/family/social history reviewed and no changes

## 2021-06-07 NOTE — LETTER
6/7/2021         RE: Poonam Chung  [address] [facility] [address]        Dear Colleague,    Thank you for referring your patient, Poonam Chung, to the [facility] [place]. Please see a copy of my visit note below.    IBD CLINIC VISIT         ASSESSMENT/PLAN  26 yo F with Ulcerative pancolitis on Humira and Azathioprine.     1. Ulcerative colitis:   Patient has ulcerative colitis which is in clinical remission.  Patient is on weekly Humira as well as azathioprine, last disease endoscopic assessment was in 2019 upon which she had cage score 2.  Her recent ESR CRP not elevated but to lactoferrin was elevated in December.  We will check fecal calprotectin, if it comes elevated plan to perform flex sig my endoscopy for disease assessment.  Given her iron deficiency, plan to repeat iron levels as well as checking celiac labs to rule out celiac disease.    -- Plan to check fecal calprotectin, if elevated plan for flex sigmoidoscopy for disease assessment  -- Checking iron panel and TTG/DGP to r/o celiac disease  -- Continue Humira weekly and Azathioprine 150mg daily  -- Surveillance colonoscopy in 2023    2. C Diff infection:     Continue to check C. difficile testing if she develops diarrhea.    3. Iron deficiency anemia  Patient is taking over-the-counter multivitamin pills, we will check repeat iron levels as well asTTG Ab/ DGP to rule out celiac disease as cause of iron deficiency anemia.  4. Oral ulcers  No current complaints for oral ulcers at this point.  Continue to monitor.    Return to clinic in 6  months    Thank you for this consultation.  It was a pleasure to participate in the care of this patient; please contact us with any further questions.  I spent a total of 35 minutes, face to face, was spent with this patient, >50% of which was counseling regarding the above delineated issues.    This note was created with voice recognition software, and while reviewed for  accuracy, typos may remain.     Zulema Roman MD  GI Fellow  Division of Gastroenterology, Hepatology and Nutrition  [facility]      IBD HISTORY  Age at diagnosis: 20  Extent of disease: pancolitis  Current UC medications:  - Adalimumab every week (started 2018, dose escalated 2019)  - Azathioprine 50mg (2018)  Prior UC surgeries:  Prior IBD Medications:  - Azathioprine 150mg --> flu like illness  - Canasa  - Infliximab 5mg/kg (400mg) q6     DRUG MONITORING  TPMT enzyme activity:      6-TGN/6-MMPN levels:  : 6-TGN: 137, 6MMPN 3888     Biologic concentration:  7/3/18: IFX: <1, Ab 30 (normal < 50) - Rocha test (q8 weeks, monotherapy)  18: IFX: Not detected, antibodies: 14     11/15/19: Adalimumab: 3.2, no antibodies     HPI:     25-year-old female who had a history of ulcerative pancolitis, diagnosed at the age of 20, now on Humira weekly as well as azathioprine with disease in clinical remission.  Patient stated that she is having 1-2 formed bowel movements every day no abdominal cramps or hematochezia.  No nocturnal symptoms.  Her last Humira shot was yesterday.  She received her second dose of Covid vaccination.  Patient is taking multivitamin pills with iron for iron replacement.    No smoking or alcohol intake no NSAID use.  Works for [employer].  Plans for outdoor activities this summer.    Rocha score:   Stool freq: 0 (baseline stools frequency)  Rectal bleedin (None)  PGA: 0 (normal)  Endoscopy: 2 (Moderate)    Extra intestinal manifestations of IBD:  No uveitis/episcleritis  Yes: aphthous ulcers   No arthritis   No erythema nodosum/pyoderma gangrenosum.     sIBDQ:  IBDQ Score Date IBDQ - Total Score  (Higher score better)   11/15/2019 57        Last endoscopic activity:  Colonoscopy 2019:  Moderate inflammation in distal rectum approx 2cm with eMayo score 2, diffuse pseudopolyps throughout the colon.  Pertinent labs / imaging:   ESR 10, CRP 2.8 in Feb  2021      ROS:    Constitutional, HEENT, cardiovascular, pulmonary, GI, , musculoskeletal, neuro, skin, endocrine and psych systems are negative, except as otherwise noted.    PHYSICAL EXAMINATION:  Not performed    PERTINENT PAST MEDICAL HISTORY:  Past Medical History:   Diagnosis Date     Ulcerative colitis (H)        PREVIOUS SURGERIES:  No past surgical history on file.    ALLERGIES:     Allergies   Allergen Reactions     Infliximab Nausea and Other (See Comments)     FACE FLUSHING WITH ITCHY/SCRATCHY THROAT.        PERTINENT MEDICATIONS:    Current Outpatient Medications:      adalimumab (HUMIRA *CF* PEN) 40 MG/0.4ML pen kit, Inject 0.4 mLs (40 mg) Subcutaneous every 7 days, Disp: 4 each, Rfl: 5     azaTHIOprine (IMURAN) 50 MG tablet, Take 3 tablets (150 mg) by mouth daily, Disp: 90 tablet, Rfl: 3     UCERIS 2 MG/ACT FOAM, , Disp: , Rfl:     SOCIAL HISTORY:  Social History     Socioeconomic History     Marital status: Single     Spouse name: Not on file     Number of children: Not on file     Years of education: Not on file     Highest education level: Not on file   Occupational History     Not on file   Social Needs     Financial resource strain: Not on file     Food insecurity     Worry: Not on file     Inability: Not on file     Transportation needs     Medical: Not on file     Non-medical: Not on file   Tobacco Use     Smoking status: Never Smoker     Smokeless tobacco: Never Used   Substance and Sexual Activity     Alcohol use: Not Currently     Drug use: Not Currently     Sexual activity: Not on file   Lifestyle     Physical activity     Days per week: Not on file     Minutes per session: Not on file     Stress: Not on file   Relationships     Social connections     Talks on phone: Not on file     Gets together: Not on file     Attends Sikh service: Not on file     Active member of club or organization: Not on file     Attends meetings of clubs or organizations: Not on file     Relationship status:  "Not on file     Intimate partner violence     Fear of current or ex partner: Not on file     Emotionally abused: Not on file     Physically abused: Not on file     Forced sexual activity: Not on file   Other Topics Concern     Not on file   Social History Narrative     Not on file       FAMILY HISTORY:  No family history on file.    Past/family/social history reviewed and no changes      Attestation with edits by Theodore Moeller MD at 6/9/2021 11:16 AM:  I performed a history and physical examination of this patient and discussed the management with Dr. Roman on 6/7/2021. I reviewed the note and there are no changes to the past medical, family or social history.  A complete 10 point review of systems was obtained. Please see the HPI for pertinent positives and negatives. All other systems were reviewed and were found to be negative. I agree with the documented findings and plan of care as outlined.    Remains in clinical remission. Deferred endoscopic assessment due to COVID. Prior fecal biomarker of inflammation greatly improved, although she did have iron deficiency.     At this point will follow disease with fecal biomarker (calprotectin or lactoferrin). If elevated, will pursue flex sig.     Will re-check iron labs and also check celiac serologies.     Return to IBD clinic (in person or virtual) in 6 months.     Theodore Moeller MD  GI Attending  Pager: [phone]      Poonam Chung is a 25 year old female who is being evaluated via a billable video visit.      The patient has been notified of following:     \"This video visit will be conducted via a call between you and your physician/provider. We have found that certain health care needs can be provided without the need for an in-person physical exam.  This service lets us provide the care you need with a video conversation.  If a prescription is necessary we can send it directly to your pharmacy.  If lab work is needed we can place an order for that and you can " "then stop by our lab to have the test done at a later time.    If during the course of the call the physician/provider feels a video visit is not appropriate, you will not be charged for this service.\"     Patient confirmed that they are in [place] for today's visit yes.    Video-Visit Details  Type of service:  Video Visit    Video Start Time: 1:12 PM  Video End Time:  1:25 PM    Originating Location (pt. Location): [place]    Distant Location (provider location):  [facility] [place]     Platform used: TopPatch              Again, thank you for allowing me to participate in the care of your patient.        Sincerely,        Theodore Moeller MD    "

## 2021-06-07 NOTE — PATIENT INSTRUCTIONS
-- Blood work (check iron levels, and screen for celiac disease)  -- Stool test for inflammation. If high, may need flexible sigmoidoscopy to assess inflammation in colon    Return in clinic in 6 months     If you have any questions, please don't hesitate to contact the Gastroenterology nurse at [phone].     -Dr. Moeller

## 2021-06-07 NOTE — PROGRESS NOTES
"Poonam Chung is a 25 year old female who is being evaluated via a billable video visit.      The patient has been notified of following:     \"This video visit will be conducted via a call between you and your physician/provider. We have found that certain health care needs can be provided without the need for an in-person physical exam.  This service lets us provide the care you need with a video conversation.  If a prescription is necessary we can send it directly to your pharmacy.  If lab work is needed we can place an order for that and you can then stop by our lab to have the test done at a later time.    If during the course of the call the physician/provider feels a video visit is not appropriate, you will not be charged for this service.\"     Patient confirmed that they are in [place] for today's visit yes.    Video-Visit Details  Type of service:  Video Visit    Video Start Time: 1:12 PM  Video End Time:  1:25 PM    Originating Location (pt. Location): Home    Distant Location (provider location):  [facility] [place]     Platform used: Alaina          "

## 2021-06-14 ENCOUNTER — PATIENT OUTREACH (OUTPATIENT)
Dept: GASTROENTEROLOGY | Facility: CLINIC | Age: 26
End: 2021-06-14

## 2021-06-14 DIAGNOSIS — K51.90 ULCERATIVE COLITIS (H): ICD-10-CM

## 2021-06-14 RX ORDER — AZATHIOPRINE 50 MG/1
150 TABLET ORAL DAILY
Qty: 90 TABLET | Refills: 5 | Status: SHIPPED | OUTPATIENT
Start: 2021-06-14 | End: 2022-04-08

## 2021-06-14 NOTE — PROGRESS NOTES
Will fax lab orders to     [facility] [place]  Calprotectin kit to address [address]  [facility] [address]    Refilled azathioprine    Has 6 month virtual appt with Dr. Moeller in December and will be in [place]

## 2021-06-16 ENCOUNTER — DOCUMENTATION ONLY (OUTPATIENT)
Dept: GASTROENTEROLOGY | Facility: CLINIC | Age: 26
End: 2021-06-16

## 2021-06-16 NOTE — PROGRESS NOTES
Fecal elin kit mailed via [employer] to [address] [place] [address]   [address]   [facility] [place] [address]     Daniela Rios, A

## 2021-06-17 RX ORDER — AZATHIOPRINE 50 MG/1
150 TABLET ORAL DAILY
Qty: 90 TABLET | Refills: 5 | OUTPATIENT
Start: 2021-06-17

## 2021-06-22 ENCOUNTER — TRANSFERRED RECORDS (OUTPATIENT)
Dept: HEALTH INFORMATION MANAGEMENT | Facility: CLINIC | Age: 26
End: 2021-06-22

## 2021-07-09 DIAGNOSIS — K51.00 ULCERATIVE PANCOLITIS WITHOUT COMPLICATION (H): Primary | ICD-10-CM

## 2021-07-19 ENCOUNTER — TELEPHONE (OUTPATIENT)
Dept: GASTROENTEROLOGY | Facility: CLINIC | Age: 26
End: 2021-07-19

## 2021-07-19 NOTE — TELEPHONE ENCOUNTER
Screening Questions  1. Are you active on mychart?    2. What insurance is in the chart? [employer]/ [employer]    2.  Ordering/Referring Provider: Sajan    3. BMI 24.4    4. Are you on daily home oxygen? n    5. Do you have a history of difficult airway? n    6. Have you had a heart, lung, or liver transplant? n    7. Are you currently on dialysis? n    8. Have you had a stroke or Transient ischemic atttack (TIA) within 6 months? n    9. In the past 6 months, have you had any heart related issues including cardiomyopathy or heart attack?         If yes, did it require cardiac stenting or other implantable device?n    10. Do you have any implantable devices in your body (pacemaker, defib, LVAD)? n    11. Do you take nitroglycerin? If yes, how often? n    12. Are you currently taking any blood thinners?n    13. Are you a diabetic? n    14. (Females) Are you currently pregnant? n  If yes, how many weeks?    15. Have you had a procedure in the past that was difficult to tolerate with conscious sedation? Any allergies to Fentanyl or Versed n    16. Are you taking any scheduled prescription narcotics more than once daily? n    17. Do you have any chemical dependencies such as alcohol, street drugs, or methadone? n    18. Do you have any history of post-traumatic stress syndrome or mental health issues? n    19. Do you transfer independently? y    20.  Do you have any issues with constipation? n    21. Preferred Pharmacy for Pre Prescription     Scheduling Details    Colonoscopy Prep Sent?:   Procedure Scheduled: Flex Sig  Provider/Surgeon: Sajan  Date of Procedure: 9/13  Location: [facility]  Caller (Please ask for phone number if not scheduled by patient): Poonam      Sedation Type: MAC  Conscious Sedation- Needs  for 6 hours after the procedure  MAC/General-Needs  for 24 hours after procedure    Pre-op Required at [facility], [facility], [place] and OR for MAC sedation:   (if yes advise patient they will need a pre-op  prior to procedure)      Is patient on blood thinners? -N (If yes- inform patient to follow up with PCP or provider for follow up instructions)     Informed patient they will need an adult  Y  Cannot take any type of public or medical transportation alone    Informed Patient of COVID Test Requirement Y    Confirmed Nurse will call to complete assessment Y    Additional comments:

## 2021-07-20 DIAGNOSIS — Z11.59 ENCOUNTER FOR SCREENING FOR OTHER VIRAL DISEASES: ICD-10-CM

## 2021-08-16 DIAGNOSIS — K51.00 CHRONIC UNIVERSAL ULCERATIVE COLITIS (H): ICD-10-CM

## 2021-08-17 ENCOUNTER — PATIENT OUTREACH (OUTPATIENT)
Dept: GASTROENTEROLOGY | Facility: CLINIC | Age: 26
End: 2021-08-17

## 2021-08-17 DIAGNOSIS — K51.00 CHRONIC UNIVERSAL ULCERATIVE COLITIS (H): ICD-10-CM

## 2021-08-17 RX ORDER — ADALIMUMAB 40MG/0.4ML
40 KIT SUBCUTANEOUS
Qty: 4 EACH | Refills: 5 | Status: SHIPPED | OUTPATIENT
Start: 2021-08-17 | End: 2022-02-07

## 2021-08-19 RX ORDER — ADALIMUMAB 40MG/0.4ML
KIT SUBCUTANEOUS
OUTPATIENT
Start: 2021-08-19

## 2021-08-19 NOTE — TELEPHONE ENCOUNTER
Humira Pen 40MG/0.4ML Subcutaneous Pen-injector Kit  adalimumab (HUMIRA *CF* PEN) 40 MG/0.4ML pen kit 4 each 5 8/17/2021  No   Sig - Route: Inject 0.4 mLs (40 mg) Subcutaneous every 7 days - Subcutaneous   Sent to pharmacy as: Humira Pen 40 MG/0.4ML Subcutaneous Pen-injector Kit (adalimumab)   Class: E-Prescribe   Order: [id number]   E-Prescribing Status: Receipt confirmed by pharmacy (8/18/2021 12:16 PM CDT)   Printout Tracking    External Result Report   Pharmacy    [facility] [place] [place] - [address] [facility]

## 2021-09-02 ENCOUNTER — TELEPHONE (OUTPATIENT)
Dept: GASTROENTEROLOGY | Facility: OUTPATIENT CENTER | Age: 26
End: 2021-09-02

## 2021-09-07 ENCOUNTER — TELEPHONE (OUTPATIENT)
Dept: GASTROENTEROLOGY | Facility: OUTPATIENT CENTER | Age: 26
End: 2021-09-07

## 2021-09-07 NOTE — TELEPHONE ENCOUNTER
Is patient taking blood thinners/antiplatelet medications? No    Heart Disease? Denies     Lung Disease? Denies     Sleep Apnea? Denies     Diabetic? Denies     Kidney Disease? Denies     Electronic Implantable Devices? Denies     PTSD? N/a    Prep instructions reviewed with patient? Instructions,  policy, MAC sedation plan reviewed. Instructed patient to have someone stay with them for 24 hours post exam    : Yes    Pharmacy: N/a    Indication for Procedure: Ulcerative pancolitis without complication    Referring Provider: Savanah Richard    Arrival Time: 8;30 AM    COVID test? To be scheduled at [facility]. Patient states she has fax number    Patient states she is not pregnant or lactating    Ronda Strong RN

## 2021-09-08 ENCOUNTER — PATIENT OUTREACH (OUTPATIENT)
Dept: GASTROENTEROLOGY | Facility: CLINIC | Age: 26
End: 2021-09-08

## 2021-09-08 DIAGNOSIS — K51.00 ULCERATIVE PANCOLITIS WITHOUT COMPLICATION (H): Primary | ICD-10-CM

## 2021-09-09 ENCOUNTER — DOCUMENTATION ONLY (OUTPATIENT)
Dept: GASTROENTEROLOGY | Facility: CLINIC | Age: 26
End: 2021-09-09

## 2021-09-09 NOTE — PROGRESS NOTES
Per Gaviota Mcmahon, lab order for covid test dated 9/8/22021 faxed to [facility] [phone] and [phone]       STEPHY Alfaro

## 2021-09-13 ENCOUNTER — DOCUMENTATION ONLY (OUTPATIENT)
Dept: GASTROENTEROLOGY | Facility: OUTPATIENT CENTER | Age: 26
End: 2021-09-13
Payer: COMMERCIAL

## 2021-09-13 ENCOUNTER — TRANSFERRED RECORDS (OUTPATIENT)
Dept: HEALTH INFORMATION MANAGEMENT | Facility: CLINIC | Age: 26
End: 2021-09-13

## 2021-09-13 DIAGNOSIS — K51.00 ULCERATIVE PANCOLITIS WITHOUT COMPLICATION (H): ICD-10-CM

## 2021-09-13 PROCEDURE — 88305 TISSUE EXAM BY PATHOLOGIST: CPT | Mod: TC,ORL | Performed by: INTERNAL MEDICINE

## 2021-09-13 PROCEDURE — 88305 TISSUE EXAM BY PATHOLOGIST: CPT | Mod: 26 | Performed by: PATHOLOGY

## 2021-09-14 ENCOUNTER — LAB REQUISITION (OUTPATIENT)
Dept: LAB | Facility: CLINIC | Age: 26
End: 2021-09-14
Payer: COMMERCIAL

## 2021-09-15 LAB
PATH REPORT.COMMENTS IMP SPEC: NORMAL
PATH REPORT.COMMENTS IMP SPEC: NORMAL
PATH REPORT.FINAL DX SPEC: NORMAL
PATH REPORT.GROSS SPEC: NORMAL
PATH REPORT.MICROSCOPIC SPEC OTHER STN: NORMAL
PATH REPORT.RELEVANT HX SPEC: NORMAL
PHOTO IMAGE: NORMAL

## 2021-10-10 ENCOUNTER — HEALTH MAINTENANCE LETTER (OUTPATIENT)
Age: 26
End: 2021-10-10

## 2021-12-07 ENCOUNTER — VIRTUAL VISIT (OUTPATIENT)
Dept: GASTROENTEROLOGY | Facility: CLINIC | Age: 26
End: 2021-12-07
Payer: COMMERCIAL

## 2021-12-07 DIAGNOSIS — D50.0 IRON DEFICIENCY ANEMIA DUE TO CHRONIC BLOOD LOSS: ICD-10-CM

## 2021-12-07 DIAGNOSIS — K51.00 ULCERATIVE PANCOLITIS WITHOUT COMPLICATION (H): Primary | ICD-10-CM

## 2021-12-07 PROCEDURE — 99213 OFFICE O/P EST LOW 20 MIN: CPT | Mod: GC | Performed by: INTERNAL MEDICINE

## 2021-12-07 NOTE — PROGRESS NOTES
IBD CLINIC VISIT     ASSESSMENT/PLAN  24 yo F with Ulcerative pancolitis on Humira and Azathioprine.     1. Ulcerative colitis, pancolitis:   Current medications: weekly Humira and azathiopine  Current clinical disease activity: in remission  Last endoscopic disease activity: in remission, sigmoidoscopy 9/2021 Rocha 0 (done for elevated fecal calprotectin), Veronica 0  Consider decreasing azathioprine dose in future. She did have antibodies to infliximab, so will continue current dose of azathioprine for now.  -- Continue Humira weekly and Azathioprine 150 mg daily  -- Check CBC, CMP while on biologics today    2. Iron deficiency anemia, low on 2/2021, resolved on 6/2021 recheck  Not currently on iron supplement after last recheck. Celiac serology negative. Could be slow occult bleeding from pseudopolyps.  -- Recheck iron panel this visit    3. History of C Diff infection, one episode in 2018, treated with oral vancomycin: in remission.    4. Dysplasia surveillance: Last colonoscopy 6/2019. Start at 8 years after IBD diagnosis.  -- Surveillance colonoscopy in 2023    Return to clinic in 6  Months with nick Page for VDO visit.    Thank you for this consultation.  It was a pleasure to participate in the care of this patient; please contact us with any further questions.  I spent a total of 20 minutes, face to face, was spent with this patient, >50% of which was counseling regarding the above delineated issues.    Patient is seen and discussed with Dr. Moeller.  Billie Bell MD  Gastroenterology/Hepatology Fellow  Page: [phone]    IBD HISTORY  Age at diagnosis: 20  Extent of disease: pancolitis  Current UC medications:  - Adalimumab every week (started Nov 2018, dose escalated Nov 2019)  - Azathioprine 150mg (sepetmebr 2018)  Prior UC surgeries:  Prior IBD Medications:  - Canasa  - Infliximab 5mg/kg (400mg) q6     DRUG MONITORING  TPMT enzyme activity:      6-TGN/6-MMPN levels:  7/15620: 6-TGN: 137, 6MMPN  3888     Biologic concentration:  7/3/18: IFX: <1, Ab 30 (normal < 50) - Rocha test (q8 weeks, monotherapy)  18: IFX: Not detected, antibodies: 14     11/15/19: Adalimumab: 3.2, no antibodies     HPI:   25-year-old female who had a history of ulcerative pancolitis, diagnosed at the age of 20, now on Humira weekly as well as azathioprine with disease in clinical and endoscopic remission.     No change in bowel movement from last visit. Has 1-2 formed bowel movements every day no abdominal cramps or hematochezia. No nocturnal symptoms.  Patient is no longer taking multivitamin or iron supplement pills.    No smoking or alcohol intake no NSAID use. Works for [employer].     Rocha score: 0  Stool freq: 0 (baseline stools frequency), 1-2 bowel movements a day  Rectal bleedin (None)  PGA: 0 (normal)  Endoscopy: 0 (No inflammation)    Extra intestinal manifestations of IBD:  No uveitis/episcleritis  No: aphthous ulcers   No arthritis   No erythema nodosum/pyoderma gangrenosum.     sIBDQ:  IBDQ Score Date IBDQ - Total Score  (Higher score better)   11/15/2019 57      Last endoscopic activity:  Flexible sigmoidoscopy 2021:    Patho: A. TRANSVERSE/DESCENDING  COLON, POLYP:   Colonic mucosa with no no neoplastic or hyperplastic polyp; also no evidence of cryptitis (Veronica grade 0) or dysplasia    B. RECTUM, POLYPS: Inflammatory (pseudo)polyps; negative for dysplasia or malignancy     Colonoscopy 2019:  Moderate inflammation in distal rectum approx 2cm with eMayo score 2, diffuse pseudopolyps throughout the colon.    Pertinent labs / imaging:   ESR 10, CRP 2.8 in 2021    ROS:    Constitutional, HEENT, cardiovascular, pulmonary, GI, , musculoskeletal, neuro, skin, endocrine and psych systems are negative, except as otherwise noted.    PHYSICAL EXAMINATION:  Not performed    PERTINENT PAST MEDICAL HISTORY:  Past Medical History:   Diagnosis Date     Ulcerative colitis (H)        PREVIOUS SURGERIES:  No past  surgical history on file.    ALLERGIES:     Allergies   Allergen Reactions     Infliximab Nausea and Other (See Comments)     FACE FLUSHING WITH ITCHY/SCRATCHY THROAT.        PERTINENT MEDICATIONS:    Current Outpatient Medications:      adalimumab (HUMIRA *CF* PEN) 40 MG/0.4ML pen kit, Inject 0.4 mLs (40 mg) Subcutaneous every 7 days, Disp: 4 each, Rfl: 5     azaTHIOprine (IMURAN) 50 MG tablet, Take 3 tablets (150 mg) by mouth daily, Disp: 90 tablet, Rfl: 5     UCERIS 2 MG/ACT FOAM, , Disp: , Rfl:     SOCIAL HISTORY:  Social History     Socioeconomic History     Marital status: Single     Spouse name: Not on file     Number of children: Not on file     Years of education: Not on file     Highest education level: Not on file   Occupational History     Not on file   Tobacco Use     Smoking status: Never Smoker     Smokeless tobacco: Never Used   Substance and Sexual Activity     Alcohol use: Not Currently     Drug use: Not Currently     Sexual activity: Not on file   Other Topics Concern     Not on file   Social History Narrative     Not on file     Social Determinants of Health     Financial Resource Strain: Not on file   Food Insecurity: Not on file   Transportation Needs: Not on file   Physical Activity: Not on file   Stress: Not on file   Social Connections: Not on file   Intimate Partner Violence: Not on file   Housing Stability: Not on file     FAMILY HISTORY:  No FH of IBD/CRC.  No family history on file.    Past/family/social history reviewed and no changes

## 2021-12-07 NOTE — PROGRESS NOTES
Poonam is a 26 year old who is being evaluated via a billable video visit.      How would you like to obtain your AVS? MyChart  If the video visit is dropped, the invitation should be resent by: Text to cell phone: [phone]  Will anyone else be joining your video visit? No      Video Start Time: 11:57 AM  Video-Visit Details    Type of service:  Video Visit 11:40    Video End Time:11:35    Originating Location (pt. Location): Home    Distant Location (provider location):  [facility] [employer]     Platform used for Video Visit: [employer]     I performed a history and physical examination of this patient and discussed the management with Dr. Bell on 12/7/2021. I reviewed the note and there are no changes to the past medical, family or social history.  A complete 10 point review of systems was obtained. Please see the HPI for pertinent positives and negatives. All other systems were reviewed and were found to be negative. I agree with the documented findings and plan of care as outlined.     Elevated fecal calprotectin related to pseudopolyps. Not a reliable marker of disease activity moving forward.      Pseudopolyps may intermittently bleed leading to some occult blood loss.      Otherwise, she is in clinical and endoscopic remission at this time.      Due for blood work: CBC, LFTs. Will also re-check iron labs.      Continue Humira and azathioprine for now. Plan to continue combination therapy for now given prior antibodies to infliximab.      Theodore Moeller MD  GI Attending  Pager: [phone]

## 2021-12-07 NOTE — LETTER
12/7/2021         RE: Poonam Chung  [address] [place] [facility] [address]        Dear Colleague,    Thank you for referring your patient, Poonam Chung, to the [facility] [place]. Please see a copy of my visit note below.    IBD CLINIC VISIT     ASSESSMENT/PLAN  24 yo F with Ulcerative pancolitis on Humira and Azathioprine.     1. Ulcerative colitis, pancolitis:   Current medications: weekly Humira and azathiopine  Current clinical disease activity: in remission  Last endoscopic disease activity: in remission, sigmoidoscopy 9/2021 Rocha 0 (done for elevated fecal calprotectin), Veronica 0  Consider decreasing azathioprine dose in future. She did have antibodies to infliximab, so will continue current dose of azathioprine for now.  -- Continue Humira weekly and Azathioprine 150 mg daily  -- Check CBC, CMP while on biologics today    2. Iron deficiency anemia, low on 2/2021, resolved on 6/2021 recheck  Not currently on iron supplement after last recheck. Celiac serology negative. Could be slow occult bleeding from pseudopolyps.  -- Recheck iron panel this visit    3. History of C Diff infection, one episode in 2018, treated with oral vancomycin: in remission.    4. Dysplasia surveillance: Last colonoscopy 6/2019. Start at 8 years after IBD diagnosis.  -- Surveillance colonoscopy in 2023    Return to clinic in 6  Months with nick Page for VDO visit.    Thank you for this consultation.  It was a pleasure to participate in the care of this patient; please contact us with any further questions.  I spent a total of 20 minutes, face to face, was spent with this patient, >50% of which was counseling regarding the above delineated issues.    Patient is seen and discussed with Dr. Moeller.  Billie Bell MD  Gastroenterology/Hepatology Fellow  Page: [phone]    IBD HISTORY  Age at diagnosis: 20  Extent of disease: pancolitis  Current UC medications:  - Adalimumab every week (started Nov  , dose escalated 2019)  - Azathioprine 150mg (sepetme2018)  Prior UC surgeries:  Prior IBD Medications:  - Canasa  - Infliximab 5mg/kg (400mg) q6     DRUG MONITORING  TPMT enzyme activity:      6-TGN/6-MMPN levels:  : 6-TGN: 137, 6MMPN 3888     Biologic concentration:  7/3/18: IFX: <1, Ab 30 (normal < 50) - Rocha test (q8 weeks, monotherapy)  18: IFX: Not detected, antibodies: 14     11/15/19: Adalimumab: 3.2, no antibodies     HPI:   25-year-old female who had a history of ulcerative pancolitis, diagnosed at the age of 20, now on Humira weekly as well as azathioprine with disease in clinical and endoscopic remission.     No change in bowel movement from last visit. Has 1-2 formed bowel movements every day no abdominal cramps or hematochezia. No nocturnal symptoms.  Patient is no longer taking multivitamin or iron supplement pills.    No smoking or alcohol intake no NSAID use. Works for [employer].     Rocha score: 0  Stool freq: 0 (baseline stools frequency), 1-2 bowel movements a day  Rectal bleedin (None)  PGA: 0 (normal)  Endoscopy: 0 (No inflammation)    Extra intestinal manifestations of IBD:  No uveitis/episcleritis  No: aphthous ulcers   No arthritis   No erythema nodosum/pyoderma gangrenosum.     sIBDQ:  IBDQ Score Date IBDQ - Total Score  (Higher score better)   11/15/2019 57      Last endoscopic activity:  Flexible sigmoidoscopy 2021:    Patho: A. TRANSVERSE/DESCENDING  COLON, POLYP:   Colonic mucosa with no no neoplastic or hyperplastic polyp; also no evidence of cryptitis (Veronica grade 0) or dysplasia    B. RECTUM, POLYPS: Inflammatory (pseudo)polyps; negative for dysplasia or malignancy     Colonoscopy :  Moderate inflammation in distal rectum approx 2cm with eMayo score 2, diffuse pseudopolyps throughout the colon.    Pertinent labs / imaging:   ESR 10, CRP 2.8 in 2021    ROS:    Constitutional, HEENT, cardiovascular, pulmonary, GI, , musculoskeletal, neuro,  skin, endocrine and psych systems are negative, except as otherwise noted.    PHYSICAL EXAMINATION:  Not performed    PERTINENT PAST MEDICAL HISTORY:  Past Medical History:   Diagnosis Date     Ulcerative colitis (H)        PREVIOUS SURGERIES:  No past surgical history on file.    ALLERGIES:     Allergies   Allergen Reactions     Infliximab Nausea and Other (See Comments)     FACE FLUSHING WITH ITCHY/SCRATCHY THROAT.        PERTINENT MEDICATIONS:    Current Outpatient Medications:      adalimumab (HUMIRA *CF* PEN) 40 MG/0.4ML pen kit, Inject 0.4 mLs (40 mg) Subcutaneous every 7 days, Disp: 4 each, Rfl: 5     azaTHIOprine (IMURAN) 50 MG tablet, Take 3 tablets (150 mg) by mouth daily, Disp: 90 tablet, Rfl: 5     UCERIS 2 MG/ACT FOAM, , Disp: , Rfl:     SOCIAL HISTORY:  Social History     Socioeconomic History     Marital status: Single     Spouse name: Not on file     Number of children: Not on file     Years of education: Not on file     Highest education level: Not on file   Occupational History     Not on file   Tobacco Use     Smoking status: Never Smoker     Smokeless tobacco: Never Used   Substance and Sexual Activity     Alcohol use: Not Currently     Drug use: Not Currently     Sexual activity: Not on file   Other Topics Concern     Not on file   Social History Narrative     Not on file     Social Determinants of Health     Financial Resource Strain: Not on file   Food Insecurity: Not on file   Transportation Needs: Not on file   Physical Activity: Not on file   Stress: Not on file   Social Connections: Not on file   Intimate Partner Violence: Not on file   Housing Stability: Not on file     FAMILY HISTORY:  No FH of IBD/CRC.  No family history on file.    Past/family/social history reviewed and no changes      I performed a history and physical examination of this patient and discussed the management with Dr. Bell on 12/7/2021. I reviewed the note and there are no changes to the past medical, family  or social history.  A complete 10 point review of systems was obtained. Please see the HPI for pertinent positives and negatives. All other systems were reviewed and were found to be negative. I agree with the documented findings and plan of care as outlined.     Elevated fecal calprotectin related to pseudopolyps. Not a reliable marker of disease activity moving forward.      Pseudopolyps may intermittently bleed leading to some occult blood loss.      Otherwise, she is in clinical and endoscopic remission at this time.      Due for blood work: CBC, LFTs. Will also re-check iron labs.      Continue Humira and azathioprine for now. Plan to continue combination therapy for now given prior antibodies to infliximab.      Theodore Moeller MD  GI Attending  Pager: [phone]      Again, thank you for allowing me to participate in the care of your patient.      Sincerely,    Theodore Moeller MD

## 2021-12-07 NOTE — PROGRESS NOTES
I performed a history and physical examination of this patient and discussed the management with Dr. Bell on 12/7/2021. I reviewed the note and there are no changes to the past medical, family or social history.  A complete 10 point review of systems was obtained. Please see the HPI for pertinent positives and negatives. All other systems were reviewed and were found to be negative. I agree with the documented findings and plan of care as outlined.    Elevated fecal calprotectin related to pseudopolyps. Not a reliable marker of disease activity moving forward.     Pseudopolyps may intermittently bleed leading to some occult blood loss.     Otherwise, she is in clinical and endoscopic remission at this time.     Due for blood work: CBC, LFTs. Will also re-check iron labs.     Continue Humira and azathioprine for now. Plan to continue combination therapy for now given prior antibodies to infliximab.     Theodore Moeller MD  GI Attending  Pager: [phone]  Answers for HPI/ROS submitted by the patient on 12/7/2021  General Symptoms: No  Skin Symptoms: No  HENT Symptoms: No  EYE SYMPTOMS: No  HEART SYMPTOMS: No  LUNG SYMPTOMS: No  INTESTINAL SYMPTOMS: No  URINARY SYMPTOMS: No  GYNECOLOGIC SYMPTOMS: No  BREAST SYMPTOMS: No  SKELETAL SYMPTOMS: No  BLOOD SYMPTOMS: No  NERVOUS SYSTEM SYMPTOMS: No  MENTAL HEALTH SYMPTOMS: No

## 2021-12-07 NOTE — PATIENT INSTRUCTIONS
-- Labs: CBC, Liver tests, and iron stores - OK to get locally.     -- Continue Humira and azathioprine for now.     Follow-up in 6 months.

## 2021-12-09 ENCOUNTER — DOCUMENTATION ONLY (OUTPATIENT)
Dept: GASTROENTEROLOGY | Facility: CLINIC | Age: 26
End: 2021-12-09
Payer: COMMERCIAL

## 2021-12-09 ENCOUNTER — PATIENT OUTREACH (OUTPATIENT)
Dept: GASTROENTEROLOGY | Facility: CLINIC | Age: 26
End: 2021-12-09
Payer: COMMERCIAL

## 2021-12-09 DIAGNOSIS — K51.00 ULCERATIVE PANCOLITIS WITHOUT COMPLICATION (H): Primary | ICD-10-CM

## 2021-12-09 NOTE — PROGRESS NOTES
Lab order dated 12/9/21 by Dr. Moeller faxed to Fax [phone]     [facility]       STEPHY Alfaro

## 2022-01-06 DIAGNOSIS — K51.00 CHRONIC UNIVERSAL ULCERATIVE COLITIS (H): ICD-10-CM

## 2022-01-07 RX ORDER — ADALIMUMAB 40MG/0.4ML
40 KIT SUBCUTANEOUS
Qty: 4 EACH | Refills: 5 | OUTPATIENT
Start: 2022-01-07

## 2022-01-07 NOTE — TELEPHONE ENCOUNTER
Dup - pharm called  last Rx: 8-17-21 4 each w/5 RF @ [facility], [phone]  [facility] - [place], [place] - [address] [facility] [address]

## 2022-02-07 ENCOUNTER — PATIENT OUTREACH (OUTPATIENT)
Dept: GASTROENTEROLOGY | Facility: CLINIC | Age: 27
End: 2022-02-07
Payer: COMMERCIAL

## 2022-02-07 DIAGNOSIS — K51.00 CHRONIC UNIVERSAL ULCERATIVE COLITIS (H): ICD-10-CM

## 2022-02-07 RX ORDER — ADALIMUMAB 40MG/0.4ML
40 KIT SUBCUTANEOUS
Qty: 4 EACH | Refills: 5 | Status: SHIPPED | OUTPATIENT
Start: 2022-02-07 | End: 2022-10-31 | Stop reason: ALTCHOICE

## 2022-02-07 NOTE — PROGRESS NOTES
Patient calls requesting a refill of humira to be sent to [facility] in [place] which is a change in pharmacy    Patient is overdue for labs  Lab orders had been faxed to [facility] in [place]  Patient states she will complete asap  Patient does have a follow up clinic appointment

## 2022-02-10 ENCOUNTER — TELEPHONE (OUTPATIENT)
Dept: GASTROENTEROLOGY | Facility: CLINIC | Age: 27
End: 2022-02-10

## 2022-02-10 NOTE — TELEPHONE ENCOUNTER
PA Initiation    Medication: Humira-PA Pending with [employer] insurance  Insurance Company: [facility] - Phone [phone] Fax [phone]  Pharmacy Filling the Rx: [id number] - [place], [place] - [address] [place] [address]  Filling Pharmacy Phone:    Filling Pharmacy Fax:    Start Date: 2/10/2022

## 2022-02-10 NOTE — TELEPHONE ENCOUNTER
Prior Authorization Specialty Medication Request    Medication/Dose: adalimumab (HUMIRA *CF* PEN) 40 MG/0.4ML pen kit SECONDARY INSURANCE  ICD code (if different than what is on RX):    Previously Tried and Failed:      Important Lab Values:   Rationale:     Insurance Name:   Insurance ID:   Insurance Phone Number:     Pharmacy Information (if different than what is on RX)  Name:    Phone: [phone]          [facility] pharmacy (phone ) contacted PA team because they are needing a PA through patient's secondary insurance [facility].

## 2022-03-24 ENCOUNTER — TELEPHONE (OUTPATIENT)
Dept: GASTROENTEROLOGY | Facility: CLINIC | Age: 27
End: 2022-03-24
Payer: COMMERCIAL

## 2022-03-24 NOTE — TELEPHONE ENCOUNTER
PA Initiation    Medication: Humira PA renewal (primary)  Insurance Company: [facility] - Phone [phone] Fax [phone]  Pharmacy Filling the Rx: [facility] - [place], [place] - [facility] [address]  Filling Pharmacy Phone:    Filling Pharmacy Fax:    Start Date: 3/24/2022

## 2022-03-29 DIAGNOSIS — K51.90 ULCERATIVE COLITIS (H): ICD-10-CM

## 2022-03-30 NOTE — TELEPHONE ENCOUNTER
Prior Authorization Approval    Authorization Effective Date: 3/24/2022  Authorization Expiration Date: 3/24/2023  Medication: Humira PA renewal (primary) Approved  Approved Dose/Quantity: 2 per 28  Reference #: [id number]   Insurance Company: [employer] [place] - Phone [phone] Fax [phone]  Expected CoPay:       CoPay Card Available:      Foundation Assistance Needed:    Which Pharmacy is filling the prescription (Not needed for infusion/clinic administered): [facility] - [place] [address]  Pharmacy Notified: yes  Patient Notified:  Yes

## 2022-03-31 NOTE — TELEPHONE ENCOUNTER
AZATHIOPRINE 50MG TABLET  OUTSIDE LABS AVAILABLE IN THE LAB TAB OR CARE EVERYWHERE.  Last Written Prescription Date:  6/14/2021  Last Fill Quantity: 90,   # refills: 5  Last Office Visit: 12/7/21  Future Office visit:  6/21/22      Routing refill request to provider for review/approval because:  Labs past due:   Labs past due. Orders faxed to [facility] 12/10/2021   Outside labs reviewed in care everywhere   2/15/21 [place] CBCP and LFT       Cr 3/22/2019

## 2022-04-06 DIAGNOSIS — K51.00 ULCERATIVE PANCOLITIS (H): Primary | ICD-10-CM

## 2022-04-08 DIAGNOSIS — K51.90 ULCERATIVE COLITIS (H): ICD-10-CM

## 2022-04-08 RX ORDER — AZATHIOPRINE 50 MG/1
150 TABLET ORAL DAILY
Qty: 90 TABLET | Refills: 3 | Status: ON HOLD | OUTPATIENT
Start: 2022-04-08 | End: 2023-02-27

## 2022-04-08 RX ORDER — AZATHIOPRINE 50 MG/1
TABLET ORAL
Qty: 90 TABLET | Refills: 3 | OUTPATIENT
Start: 2022-04-08

## 2022-04-08 NOTE — PROGRESS NOTES
Voice mail asking for refill of azathioprine  Due for for azathioprine monitoring labs  Left a message for patient to call back  Pt does have follow up with Dr Moeller in  July

## 2022-05-21 ENCOUNTER — HEALTH MAINTENANCE LETTER (OUTPATIENT)
Age: 27
End: 2022-05-21

## 2022-07-05 ENCOUNTER — VIRTUAL VISIT (OUTPATIENT)
Dept: GASTROENTEROLOGY | Facility: CLINIC | Age: 27
End: 2022-07-05
Payer: COMMERCIAL

## 2022-07-05 VITALS — WEIGHT: 170 LBS | BODY MASS INDEX: 24.39 KG/M2

## 2022-07-05 DIAGNOSIS — K51.00 ULCERATIVE PANCOLITIS (H): Primary | ICD-10-CM

## 2022-07-05 DIAGNOSIS — T78.2XXS ANAPHYLAXIS, SEQUELA: Primary | ICD-10-CM

## 2022-07-05 PROCEDURE — 99214 OFFICE O/P EST MOD 30 MIN: CPT | Mod: 95 | Performed by: INTERNAL MEDICINE

## 2022-07-05 RX ORDER — EPINEPHRINE 0.3 MG/.3ML
0.3 INJECTION SUBCUTANEOUS PRN
Qty: 2 EACH | Refills: 0 | Status: SHIPPED | OUTPATIENT
Start: 2022-07-05

## 2022-07-05 ASSESSMENT — PAIN SCALES - GENERAL: PAINLEVEL: NO PAIN (0)

## 2022-07-05 NOTE — PATIENT INSTRUCTIONS
Overall you are doing fantastic! Keep up the good work.     Plan:  -- OK to stop azathioprine. We were only using it to prevent your body from developing antibodies to Humira. At this point, 4 years on Humira, the risk of antibodies is low.     -- Continue Weekly Humira    -- Labs in 8-12 weeks: routine labs and also a Humira level    -- Stool test for inflammation in 8-12 weeks.     If you colitis returns or you develop antibodies to Humira, we will talk about alternative medications for IBD.     If you have any questions, please don't hesitate to contact the Gastroenterology nurse at [phone].      -Dr. Moeller

## 2022-07-05 NOTE — PROGRESS NOTES
Poonam is a 26 year old who is being evaluated via a billable video visit.      How would you like to obtain your AVS? MyChart  If the video visit is dropped, the invitation should be resent by: Send to e-mail at: [email or web address]  Will anyone else be joining your video visit? No      Video-Visit Details    Video Start Time: 8:44 AM    Type of service:  Video Visit    Video End Time:9:02 AM    Originating Location (pt. Location): Home    Distant Location (provider location):  [facility]STROENTEROLOGY CLIN[facility] [place]     Platform used for Video Visit: [employer]

## 2022-07-05 NOTE — LETTER
7/5/2022         RE: Poonam Chung  [address] [facility] [address]        Dear Colleague,    Thank you for referring your patient, Poonam Chung, to the [facility] [place]. Please see a copy of my visit note below.    IBD CLINIC VISIT     ASSESSMENT/PLAN  26 year old feljosette with Ulcerative pancolitis on Humira and Azathioprine.     1. Ulcerative colitis, pancolitis:   Current UC medications:   - Adalimumab every week (started Nov 2018, dose escalated Nov 2019)   - Azathioprine 150mg (sepetmebr 2018)  Current clinical disease activity: in remission  Last endoscopic disease activity:flex sig (9/2021) Rocha 0, Veronica 0    We discussed overall trajectory and goal of getting off azathioprine given long term side effects. Discussed risk of antibodies to adalimumab and risk of injection reactions and flare. Patient in agreement to stop azathioprine and continue adalimumab monotherapy. Given patients prior reaction to infliximab, will write for an epi-pen for patient to have at hand. Will have IBD pharmacist call patient to review use and signs and symptoms of antibodies to adalimumab.     Should she develop antibodies or flare, we could try a newer biologic with a lower risk of antibodies, or consider a small molecule (e.g., ozanimod).     -- Continue Humira weekly   -- Stop azathioprine  -- Labs in 8-12 weeks (off azathioprine)  -- Fecal calprotectin in 8-12 weeks (off azathioprine)  -- Epipen written for to have on hand  -- IBD pharmacist check in, 1-2 weeks to assess for adalimumab induced drug reactions.     2. Iron deficiency anemia, low on 2/2021, resolved on 6/2021 recheck  Not currently on iron supplement after last recheck. Celiac serology negative. Could be slow occult bleeding from pseudopolyps.  -- Trend for anemia    3. History of C Diff infection, one episode in 2018, treated with oral vancomycin: in remission.    4. Dysplasia surveillance: Last colonoscopy 6/2019. Start  at 8 years after IBD diagnosis.  -- Surveillance colonoscopy in     Return to clinic in 6 months.    Thank you for this consultation.  It was a pleasure to participate in the care of this patient; please contact us with any further questions.  A total of 31 minutes was spent with this patient on the date of the encounter, 2022, in the following care activities: Chart review, patient care and documentation.      Theodore Moeller MD MS    [facility]  Inflammatory Bowel Disease Program  Division of Gastroenterology, Hepatology and Nutrition  Pager: [phone]    IBD HISTORY  Age at diagnosis: 20  Extent of disease: pancolitis  Prior UC surgeries:  Prior IBD Medications:  - Canasa  - Infliximab 5mg/kg (400mg) q6     DRUG MONITORING  TPMT enzyme activity:      6-TGN/6-MMPN levels:  7/15/20: 6-TGN: 137, 6MMPN 3888     Biologic concentration:  7/3/18: IFX: <1, Ab 30 (normal < 50) - Rocha test (q8 weeks, monotherapy)  18: IFX: Not detected, antibodies: 14     11/15/19: Adalimumab: 3.2, no antibodies     HPI:   Question about decreasing dose of azathiorpine    She is feeling well. Having 2 stools a day. No blood on her sotol. Stool are formed. No urgency. No upper GI symptoms.     She will get a cold sore 3-4 times a year, but self resolves and stable.     Rocha score:   Stool freq: 0 (baseline stools frequency)  Rectal bleedin (None)  PGA: 0 (normal)  Endoscopy: Not done    Remission: <3   Mild disease: 3-5  Moderate disease: 6-10  Severe disease: >10     Extra intestinal manifestations of IBD:  No uveitis/episcleritis  No: aphthous ulcers   No arthritis   No erythema nodosum/pyoderma gangrenosum.     sIBDQ:  IBDQ Score Date IBDQ - Total Score  (Higher score better)   11/15/2019 57      Last endoscopic activity:  Flexible sigmoidoscopy 2021:    Patho: A. TRANSVERSE/DESCENDING  COLON, POLYP:   Colonic mucosa with no no neoplastic or hyperplastic polyp; also no evidence of cryptitis  (Veronica grade 0) or dysplasia    B. RECTUM, POLYPS: Inflammatory (pseudo)polyps; negative for dysplasia or malignancy     Colonoscopy 2019:  Moderate inflammation in distal rectum approx 2cm with eMayo score 2, diffuse pseudopolyps throughout the colon.    Pertinent labs / imaging:   ESR 10, CRP 2.8 in Feb 2021    ROS:    Constitutional, HEENT, cardiovascular, pulmonary, GI, , musculoskeletal, neuro, skin, endocrine and psych systems are negative, except as otherwise noted.    PHYSICAL EXAMINATION:  Not performed    PERTINENT PAST MEDICAL HISTORY:  Past Medical History:   Diagnosis Date     Ulcerative colitis (H)        PREVIOUS SURGERIES:  No past surgical history on file.    ALLERGIES:     Allergies   Allergen Reactions     Infliximab Nausea and Other (See Comments)     FACE FLUSHING WITH ITCHY/SCRATCHY THROAT.        PERTINENT MEDICATIONS:    Current Outpatient Medications:      adalimumab (HUMIRA *CF* PEN) 40 MG/0.4ML pen kit, Inject 0.4 mLs (40 mg) Subcutaneous every 7 days, Disp: 4 each, Rfl: 5     azaTHIOprine (IMURAN) 50 MG tablet, Take 3 tablets (150 mg) by mouth daily, Disp: 90 tablet, Rfl: 3     UCERIS 2 MG/ACT FOAM, , Disp: , Rfl:     SOCIAL HISTORY:  Social History     Socioeconomic History     Marital status: Single     Spouse name: Not on file     Number of children: Not on file     Years of education: Not on file     Highest education level: Not on file   Occupational History     Not on file   Tobacco Use     Smoking status: Never Smoker     Smokeless tobacco: Never Used   Substance and Sexual Activity     Alcohol use: Not Currently     Drug use: Not Currently     Sexual activity: Not on file   Other Topics Concern     Not on file   Social History Narrative     Not on file     Social Determinants of Health     Financial Resource Strain: Not on file   Food Insecurity: Not on file   Transportation Needs: Not on file   Physical Activity: Not on file   Stress: Not on file   Social Connections: Not on  file   Intimate Partner Violence: Not on file   Housing Stability: Not on file     FAMILY HISTORY:  No FH of IBD/CRC.  No family history on file.    Past/family/social history reviewed and no changes        Sincerely,    Theodorekendall Moeller MD

## 2022-07-12 ENCOUNTER — PATIENT OUTREACH (OUTPATIENT)
Dept: GASTROENTEROLOGY | Facility: CLINIC | Age: 27
End: 2022-07-12

## 2022-07-12 NOTE — TELEPHONE ENCOUNTER
She will need to drive to a [facility]     Or we put in through another lab, but she might get out of pocket cost.     Not sure what else to do     Left a voice mail message and my chart message to discuss logistics of level

## 2022-07-14 ENCOUNTER — PATIENT OUTREACH (OUTPATIENT)
Dept: GASTROENTEROLOGY | Facility: CLINIC | Age: 27
End: 2022-07-14

## 2022-07-14 NOTE — TELEPHONE ENCOUNTER
Patient calls to discuss humira level \  Patient asking what the cost would be for her to have the level if she did not have drawn and resulted at a [facility] facility  Informed her that [facility] has a contract with [employer] for the levels   If not having drawn at [facility] lab will need to call the clinic lab where she is having it drawn to check with lab and her insurance

## 2022-09-18 ENCOUNTER — HEALTH MAINTENANCE LETTER (OUTPATIENT)
Age: 27
End: 2022-09-18

## 2022-10-03 ENCOUNTER — TELEPHONE (OUTPATIENT)
Dept: GASTROENTEROLOGY | Facility: CLINIC | Age: 27
End: 2022-10-03

## 2022-10-03 ENCOUNTER — PATIENT OUTREACH (OUTPATIENT)
Dept: GASTROENTEROLOGY | Facility: CLINIC | Age: 27
End: 2022-10-03

## 2022-10-03 DIAGNOSIS — K51.00 ULCERATIVE PANCOLITIS WITHOUT COMPLICATION (H): Primary | ICD-10-CM

## 2022-10-03 NOTE — TELEPHONE ENCOUNTER
Patient calls to reports for the last 1.5 weeks has been having loose stools and cramping   Report 8 to 9 diarrhea loose stools   Recently was    States not sure if related to stress of wedding    Stopped azathioprine in July after visit with Dr Moeller   Due for humira   \  Will do labs and humira level   Patient aware that as she is having humira level outside [facility] lab that will need to let clinic know if problems of covering this lab ans stool studies     Per appt in July    -- OK to stop azathioprine. We were only using it to prevent your body from developing antibodies to Humira. At this point, 4 years on Humira, the risk of antibodies is low.      -- Continue Weekly Humira     -- Labs in 8-12 weeks: routine labs and also a Humira level     -- Stool test for inflammation in 8-12 weeks.      If you colitis returns or you develop antibodies to Humira, we will talk about alternative medications for IBD.

## 2022-10-03 NOTE — TELEPHONE ENCOUNTER
----- Message from Gaviota Zelaya RN sent at 10/3/2022 11:16 AM CDT -----  Regarding: please fax lab orders  Please fax lab orders [facility] today   Humira level will not need form as pt having outside [facility]

## 2022-10-04 ENCOUNTER — MYC MEDICAL ADVICE (OUTPATIENT)
Dept: GASTROENTEROLOGY | Facility: CLINIC | Age: 27
End: 2022-10-04

## 2022-10-04 DIAGNOSIS — K51.00 ULCERATIVE PANCOLITIS WITHOUT COMPLICATION (H): Primary | ICD-10-CM

## 2022-10-05 ENCOUNTER — MYC MEDICAL ADVICE (OUTPATIENT)
Dept: GASTROENTEROLOGY | Facility: CLINIC | Age: 27
End: 2022-10-05

## 2022-10-05 RX ORDER — BUDESONIDE 9 MG/1
9 TABLET, FILM COATED, EXTENDED RELEASE ORAL EVERY MORNING
Qty: 30 TABLET | Refills: 1 | Status: SHIPPED | OUTPATIENT
Start: 2022-10-05 | End: 2022-11-15 | Stop reason: ALTCHOICE

## 2022-10-05 NOTE — TELEPHONE ENCOUNTER
Discussed labs with Dr Moeller   Plan continue Humira weekly   Start uceris   Schedule appointment with Dr Moeller in the next month  Patient in agreement with the plan     Update on symptoms   Having up to 8 stools a day   Diarrhea

## 2022-10-05 NOTE — TELEPHONE ENCOUNTER
Patient left a message that she continues to not feel well     Calprotectin is 2970   Crp 38,7  Stopped azathioprine in July   Continues on humira weekly

## 2022-10-05 NOTE — TELEPHONE ENCOUNTER
To: SURGERY CLINIC GI NURSES-      From: Poonam Chung      Created: 10/5/2022 1:54 PM        *-*-*This message has not been handled.*-*-*    Hi Dr Moeller,      I am having increased symptoms of my ulcerative colitis  with having really bad cramping, going to the bathroom 8 plus times a day, having diarrhea, and passing a little blood. I haven t really slept the past couple nights due to cramping and having to get up. I am having such bad cramping that I am starting to get nauseated.       My labs have came back and I noticed that my calprotectin is 2,970.      I was just wondering if there is anything I can do to decrease the cramping and what I can eat that won t be hard on my stomach?      I do have a wedding this weekend that I an suppose to be in but I have not even felt well enough to go to work.   I even work from home. Would you be able to provide a note for my employer?      Thank you,      Poonam Moeller,  Please see above and advice on next step.    Thank you.  Nichole

## 2022-10-06 RX ORDER — DICYCLOMINE HYDROCHLORIDE 10 MG/1
10 CAPSULE ORAL
Qty: 120 CAPSULE | Refills: 0 | Status: ON HOLD | OUTPATIENT
Start: 2022-10-06 | End: 2023-02-27

## 2022-10-06 NOTE — TELEPHONE ENCOUNTER
I am OK with Uceris and bentyl - each for 30 days.     And a flex sig.  Any IBD provider       Contacted patient to discuss addition of bentyl  Patient now scheduled for video visit

## 2022-10-10 NOTE — TELEPHONE ENCOUNTER
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

## 2022-10-11 ENCOUNTER — TELEPHONE (OUTPATIENT)
Dept: GASTROENTEROLOGY | Facility: CLINIC | Age: 27
End: 2022-10-11

## 2022-10-11 ENCOUNTER — PATIENT OUTREACH (OUTPATIENT)
Dept: GASTROENTEROLOGY | Facility: CLINIC | Age: 27
End: 2022-10-11

## 2022-10-11 DIAGNOSIS — K51.00 ULCERATIVE PANCOLITIS WITHOUT COMPLICATION (H): Primary | ICD-10-CM

## 2022-10-11 NOTE — TELEPHONE ENCOUNTER
Patient seen by Dr Moeller virtually     Continue humira at this time  Obtain humira level and check for antibodies  Left a message for patient to call back to discuss details.

## 2022-10-11 NOTE — TELEPHONE ENCOUNTER
Patient already has humira level   [facility] therapy plan entered   Standing lab orders       Patient lives in [facility] and will take orders from Dr Moeller     Will fax plan  to [phone]

## 2022-10-11 NOTE — TELEPHONE ENCOUNTER
Screening Questions  BLUE  KIND OF PREP RED  LOCATION [review exclusion criteria] GREEN  SEDATION TYPE        Y Are you active on mychart?       Theodore Moeller MD Ordering/Referring Provider?        [facility] [facility] What type of coverage do you have?      N Have you had a positive covid test in the last 90 days?     24.4 1. BMI  [BMI 40+ - review exclusion criteria]    Y  2. Are you able to give consent for your medical care? [IF NO,RN REVIEW]        N  3. Are you taking any prescription pain medications on a routine schedule?        3a. EXTENDED PREP What kind of prescription?     N 4. Do you have any chemical dependencies such as alcohol, street drugs, or methadone?    N 5. Do you have any history of post-traumatic stress syndrome, severe anxiety or history of psychosis?      **If yes 3- 5 , please schedule with MAC sedation.**          IF YES TO ANY 6 - 10 - HOSPITAL SETTING ONLY.     N 6.   Do you need assistance transferring?     N 7.   Have you had a heart or lung transplant?    N 8.   Are you currently on dialysis?   N 9.   Do you use daily home oxygen?   N 10. Do you take nitroglycerin?   10a. N If yes, how often?     11. [FEMALES]  N Are you currently pregnant?    11a.  If yes, how many weeks? [ Greater than 12 weeks, OR NEEDED]    N 12. Do you have Pulmonary Hypertension? *NEED PAC APPT AT [facility]*     N 13. [review exclusion criteria]  Do you have any implantable devices in your body (pacemaker, defib, LVAD)?    N 14. In the past 6 months, have you had any heart related issues including cardiomyopathy or heart attack?     14a.  If yes, did it require cardiac stenting if so when?     N 15. Have you had a stroke or Transient ischemic attack (TIA - aka  mini stroke ) within 6 months?      N 16. Do you have mod to severe Obstructive Sleep Apnea?  [Hospital only - Ok at [facility]]    N 17. Do you have SEVERE AND UNCONTROLLED asthma? *NEED PAC APPT AT [facility]*     N 18. Are you currently taking any  "blood thinners?     18a. If yes, inform patient to \"follow up w/ ordering provider for bridging instructions.\"    N 19. Do you take the medication Phentermine?    19a. If yes, \"Hold for 7 days before procedure.  Please consult your prescribing provider if you have questions about holding this medication.\"     N  20. Do you have chronic kidney disease?      N  21. Do you have a diagnosis of diabetes?       22. On a regular basis do you go 3-5 days between bowel movements?      23. Preferred LOCAL Pharmacy for Pre Prescription    [ LIST ONLY ONE PHARMACY]        [facility] [place] [place] [facility] [address] [id number] - [place], [place] - [address] [place] [address]        - CLOSING REMINDERS -    Informed patient they will need an adult    Cannot take any type of public or medical transportation alone    Conscious Sedation- Needs  for 6 hours after the procedure       MAC/General-Needs  for 24 hours after procedure    Pre-Procedure Covid test to be completed [[facility] PCR Testing Required]    Confirmed Nurse will call to complete assessment       - SCHEDULING DETAILS -     BENI  Surgeon    12/19/22  Date of Procedure  FLEXIBLE SIGMOIDOSCOPY [Flex Sig]  Type of Procedure Scheduled    Location  CSSedation Type      PAC / Pre-op Required         Additional comments:            "

## 2022-10-12 ENCOUNTER — TELEPHONE (OUTPATIENT)
Dept: GASTROENTEROLOGY | Facility: CLINIC | Age: 27
End: 2022-10-12

## 2022-10-12 PROBLEM — K51.00 ULCERATIVE PANCOLITIS WITHOUT COMPLICATION (H): Status: ACTIVE | Noted: 2022-10-12

## 2022-10-12 RX ORDER — ALBUTEROL SULFATE 0.83 MG/ML
2.5 SOLUTION RESPIRATORY (INHALATION)
Status: CANCELLED | OUTPATIENT
Start: 2022-10-12

## 2022-10-12 RX ORDER — DIPHENHYDRAMINE HYDROCHLORIDE 50 MG/ML
50 INJECTION INTRAMUSCULAR; INTRAVENOUS
Status: CANCELLED
Start: 2022-10-12

## 2022-10-12 RX ORDER — METHYLPREDNISOLONE SODIUM SUCCINATE 125 MG/2ML
125 INJECTION, POWDER, LYOPHILIZED, FOR SOLUTION INTRAMUSCULAR; INTRAVENOUS
Status: CANCELLED
Start: 2022-10-12

## 2022-10-12 RX ORDER — EPINEPHRINE 1 MG/ML
0.3 INJECTION, SOLUTION, CONCENTRATE INTRAVENOUS EVERY 5 MIN PRN
Status: CANCELLED | OUTPATIENT
Start: 2022-10-12

## 2022-10-12 RX ORDER — ALBUTEROL SULFATE 90 UG/1
1-2 AEROSOL, METERED RESPIRATORY (INHALATION)
Status: CANCELLED
Start: 2022-10-12

## 2022-10-12 RX ORDER — MEPERIDINE HYDROCHLORIDE 25 MG/ML
25 INJECTION INTRAMUSCULAR; INTRAVENOUS; SUBCUTANEOUS EVERY 30 MIN PRN
Status: CANCELLED | OUTPATIENT
Start: 2022-10-12

## 2022-10-12 NOTE — TELEPHONE ENCOUNTER
----- Message from Gaviota Zelaya RN sent at 10/11/2022  4:55 PM CDT -----  Regarding: please fax orders  Please fax therapy plan     Office notes and lab orders     To  [facility]

## 2022-10-12 NOTE — TELEPHONE ENCOUNTER
Go ahead to stop humira once Entyvio if approved   Patient has tried and failed infliximab also (developed antibodies)

## 2022-10-12 NOTE — TELEPHONE ENCOUNTER
Office note with Sajan from 10/11/2022 along with lab orders faxed to [facility] at [phone].

## 2022-10-12 NOTE — TELEPHONE ENCOUNTER
Health Call Center    Phone Message    May a detailed message be left on voicemail: yes     Reason for Call: Other: Maya RN from [facility] in [place], [place] called wanting to verify that an infusion is needed for the pt. Per Maya, they received a fax from [email or web address]  with orders for labs, but not for infusion as they had discussed about yesterday. Please verify that an infusion is needed. Maya's call back # is [phone]. Fax # is [phone]     Action Taken: Other: [facility] gi    Travel Screening: Not Applicable

## 2022-10-13 ENCOUNTER — TELEPHONE (OUTPATIENT)
Dept: GASTROENTEROLOGY | Facility: CLINIC | Age: 27
End: 2022-10-13

## 2022-10-13 NOTE — TELEPHONE ENCOUNTER
----- Message from Gaviota Zelaya RN sent at 10/12/2022  4:35 PM CDT -----  Regarding: please fax therapy plan and lab orders  Will fax plan  to [phone]    [facility]       Pt is already in the "[employer] system so do not need to send demo      Thanks gaviota

## 2022-10-14 NOTE — TELEPHONE ENCOUNTER
Infusion orders were received and awaiting the prior authorization    Patient aware she will be contacted once the prior has been approved.

## 2022-10-17 NOTE — TELEPHONE ENCOUNTER
Primary care clinic called back and states primary would not be able to co sign therapy plan    Informed caller that do not co signature  Thanks caller for calling back

## 2022-10-19 ENCOUNTER — TELEPHONE (OUTPATIENT)
Dept: GASTROENTEROLOGY | Facility: CLINIC | Age: 27
End: 2022-10-19

## 2022-10-19 NOTE — TELEPHONE ENCOUNTER
[facility] Call Center    Phone Message    May a detailed message be left on voicemail: yes     Reason for Call: Other: Zayda from [facility] in [place], [place] called in regarding an order they received for [facility]. She stated that she is working on getting a prior authorization for this patient, but in the notes that she received it does not include anything about this specific infusion. She is requesting a call back in order to get some clarification prior to sending the PA. She can be reached at [phone] and stated that it was ok to leave a VM as it is her direct number at work.      Action Taken: Message routed to:  [facility] GI    Travel Screening: Not Applicable

## 2022-10-20 NOTE — TELEPHONE ENCOUNTER
Left a message that patient has tried and failed humira   Patient is symptomatic   Very elevated calprotectin.   Left my co worker's number as I am on vacation the next two days

## 2022-10-28 ENCOUNTER — PATIENT OUTREACH (OUTPATIENT)
Dept: GASTROENTEROLOGY | Facility: CLINIC | Age: 27
End: 2022-10-28

## 2022-10-28 NOTE — TELEPHONE ENCOUNTER
Patient called to report that she is having more blood  Patient is taking budenoside 9mg   Inquired about the uceris foam and said she did not  and will check with her pharmacy   Also asked for an update on her infusion prior     Patient said Elle said still waiting for a prior from [facility]         Patient said pharmacy said uceris foam was not received at pharmacy   Contacted pharmacy and states uceris foam prescription was received   Could not tell me why it was not filled

## 2022-10-31 ENCOUNTER — TELEPHONE (OUTPATIENT)
Dept: GASTROENTEROLOGY | Facility: CLINIC | Age: 27
End: 2022-10-31

## 2022-10-31 NOTE — TELEPHONE ENCOUNTER
[facility] Call Center    Phone Message    May a detailed message be left on voicemail: yes     Reason for Call: Other: Elle - nurse navigator called and Entyivio has been approved; therefore, they will get patient started as soon as possible.     Action Taken: Message routed to:  [facility]    Travel Screening: Not Applicable

## 2022-10-31 NOTE — PROGRESS NOTES
Contacted [facility] to check on status of  Infusion. Patient approved with primary and check today for second insurance and approval not needed. Patient is scheduled for Entyvio infusion today at 3 pm

## 2022-10-31 NOTE — TELEPHONE ENCOUNTER
Patient was asking for the uceris under her maiden name   Patient is now taking uceris   [facility]edo approved and first infusion today the 31st.   Patient stopped humira

## 2022-11-03 ENCOUNTER — PATIENT OUTREACH (OUTPATIENT)
Dept: GASTROENTEROLOGY | Facility: CLINIC | Age: 27
End: 2022-11-03

## 2022-11-03 NOTE — TELEPHONE ENCOUNTER
Patient called to request a letter for work to excuse her from work for 3 days and refill for uceris   Suggested patient use uceris foam twice a day   That she has one refill of uceris   Continues to have blood in stools and loose stools   Letter for work written

## 2022-11-14 ENCOUNTER — PATIENT OUTREACH (OUTPATIENT)
Dept: GASTROENTEROLOGY | Facility: CLINIC | Age: 27
End: 2022-11-14

## 2022-11-14 DIAGNOSIS — K51.00 ULCERATIVE PANCOLITIS WITHOUT COMPLICATION (H): Primary | ICD-10-CM

## 2022-11-14 NOTE — TELEPHONE ENCOUNTER
Patient reports losing her sense of taste and smell on Saturday   Denies any fever  Continues to have up to 8 loose stools a day   Patient continues on budenoside and uceris foam   Discussed patient's symptoms with infusioin nurse at [facility] infusion   Also with Dr Moeller  Will do covid test today and then another one in am on the 15th if negative can infuse on the 15th in pm   Infusion appointment cancelled for today and rescheduled same time on the 15th at 130 pm   Patient in agreement with the plan

## 2022-11-14 NOTE — TELEPHONE ENCOUNTER
Mother called to ask if patient could have extra bag of fluids with her entyvio infusion   Mother states patient is dehydrated and had entyvio appointment today   Mother said patient sense of taste and smell

## 2022-11-15 RX ORDER — PREDNISONE 5 MG/1
TABLET ORAL
Qty: 227 TABLET | Refills: 0 | Status: SHIPPED | OUTPATIENT
Start: 2022-11-15 | End: 2023-01-17

## 2022-11-15 RX ORDER — BUDESONIDE 28 MG/1
AEROSOL, FOAM RECTAL
Qty: 33.4 G | Refills: 0 | Status: SHIPPED | OUTPATIENT
Start: 2022-11-15 | End: 2022-12-27

## 2022-11-15 NOTE — TELEPHONE ENCOUNTER
Contacted patient for update on symptoms   Patient has tested negative for covid yesterday and again today  No fever  Dry cough   No sore throat   Continues to have 10 loose stools with most stools with blood   Up at night 2 plus times   Reports today that maybe less cramping   Infusion today (second entyvio)   Will route to Dr Moeller

## 2022-11-21 ENCOUNTER — PATIENT OUTREACH (OUTPATIENT)
Dept: GASTROENTEROLOGY | Facility: CLINIC | Age: 27
End: 2022-11-21

## 2022-11-21 DIAGNOSIS — R19.7 DIARRHEA: ICD-10-CM

## 2022-11-21 DIAGNOSIS — R11.2 NAUSEA AND VOMITING: ICD-10-CM

## 2022-11-21 DIAGNOSIS — K51.00 ULCERATIVE PANCOLITIS WITHOUT COMPLICATION (H): Primary | ICD-10-CM

## 2022-11-21 RX ORDER — ONDANSETRON 4 MG/1
4 TABLET, FILM COATED ORAL EVERY 6 HOURS PRN
Qty: 20 TABLET | Refills: 1 | Status: SHIPPED | OUTPATIENT
Start: 2022-11-21 | End: 2023-12-08

## 2022-11-21 NOTE — TELEPHONE ENCOUNTER
Patient calls to report that she is nauseated and has had a couple of episodes of vomiting once on Saturday and one on Sunday   Stools continue to be loose stools   Per patient 20 on  Sunday   Started prednisone on November 16  Infusion was on November 16 (2ND ONE)  Labs in care everywhere sed rate 44  PLATELETS 531   CRP 44   Has colonoscopy scheduled on December 19    Route to Dr Moeller for further direction

## 2022-11-22 PROBLEM — R11.2 NAUSEA AND VOMITING: Status: ACTIVE | Noted: 2022-11-22

## 2022-11-22 PROBLEM — R19.7 DIARRHEA: Status: ACTIVE | Noted: 2022-11-22

## 2022-11-22 RX ORDER — MEPERIDINE HYDROCHLORIDE 25 MG/ML
25 INJECTION INTRAMUSCULAR; INTRAVENOUS; SUBCUTANEOUS EVERY 30 MIN PRN
Status: CANCELLED | OUTPATIENT
Start: 2022-11-22

## 2022-11-22 RX ORDER — ALBUTEROL SULFATE 90 UG/1
1-2 AEROSOL, METERED RESPIRATORY (INHALATION)
Status: CANCELLED
Start: 2022-11-22

## 2022-11-22 RX ORDER — ALBUTEROL SULFATE 0.83 MG/ML
2.5 SOLUTION RESPIRATORY (INHALATION)
Status: CANCELLED | OUTPATIENT
Start: 2022-11-22

## 2022-11-22 RX ORDER — EPINEPHRINE 1 MG/ML
0.3 INJECTION, SOLUTION, CONCENTRATE INTRAVENOUS EVERY 5 MIN PRN
Status: CANCELLED | OUTPATIENT
Start: 2022-11-22

## 2022-11-22 RX ORDER — DIPHENHYDRAMINE HYDROCHLORIDE 50 MG/ML
50 INJECTION INTRAMUSCULAR; INTRAVENOUS
Status: CANCELLED
Start: 2022-11-22

## 2022-11-22 RX ORDER — METHYLPREDNISOLONE SODIUM SUCCINATE 125 MG/2ML
125 INJECTION, POWDER, LYOPHILIZED, FOR SOLUTION INTRAMUSCULAR; INTRAVENOUS
Status: CANCELLED
Start: 2022-11-22

## 2022-11-22 NOTE — PROGRESS NOTES
Faxed generic therapy irwinqn to   Direct number to infusion [phone]      Patient will be called by infusion to set up appt

## 2022-11-29 ENCOUNTER — PATIENT OUTREACH (OUTPATIENT)
Dept: GASTROENTEROLOGY | Facility: CLINIC | Age: 27
End: 2022-11-29

## 2022-11-29 NOTE — LETTER
November 29, 2022       TO: Poonam Berg  [address] [facility] [address]       To Whom It May Concern:    Ms Berg is a patient of Dr Theodore Moeller     Please excuse her from work November 22 through December 2     See may return to work on December 5         Sincerely,      Gaviota Zelaya RN

## 2022-11-29 NOTE — TELEPHONE ENCOUNTER
Patient states that her work place requesting more details on why she can not work at this time.   Will write letter as patient requests.

## 2022-11-29 NOTE — TELEPHONE ENCOUNTER
Patient calls to report that she is to drop to Prednisone 30 mg today   Continues to have 8 to 9 loose stools   Up at night for bowel movement  However the  the number has decreased   Less cramping  Had second infusion last week  Decrease in emesis only 1 yesterday   Will stay at 40 mg prednisone until Friday then will drop to 30 and continue the taper as prescribed     Requesting note for work to be excused from November 22 to December 2     Letter written.

## 2022-12-05 ENCOUNTER — PATIENT OUTREACH (OUTPATIENT)
Dept: GASTROENTEROLOGY | Facility: CLINIC | Age: 27
End: 2022-12-05

## 2022-12-05 NOTE — LETTER
December 5, 2022       TO: Poonam Berg  [address] [facility] [address]         To Whom It May Concern:    Please excuse  Ms. Berg  from work the week of December 5    Thanks for your consideration       Sincerely,      Gaviota Zelaya RN

## 2022-12-05 NOTE — LETTER
December 9, 2022       TO: Poonam LONGORIA Klym  [address] [facility] [address]         To Whom It May Concern     Ms Levin is making progress however due to her medical conditions needs to work   Limited hours and needs frequent breaks     Patient has follow up with Dr Moeller and will update after December 21   Thank you for your consideration           Sincerely,  Gaviota Zelaya RN

## 2022-12-05 NOTE — TELEPHONE ENCOUNTER
Patient would like a letter to excuse her from work for this week  Reports  She thinks she is a little better  Loose stools 4 to 5 a day   Decreasing times she is up at night     Has a flex sig on December 19       Next infusion December 12

## 2022-12-09 NOTE — TELEPHONE ENCOUNTER
Patient calls to report she is feeling better but struggling trying to work   Next entyvio December 13  Has applied for short term disability    Update on symtpoms  Up at night for bowel movements twice a night  5 to 6 loose stools a day   No blood in stool last couple of days  Does feel like she has more energy   Can work for 2 to 3 hours and then needs to rest   Scheduled for colonoscopy December 19 with Dr Moeller   Note to continue fmla until December 21

## 2022-12-12 ENCOUNTER — TELEPHONE (OUTPATIENT)
Dept: GASTROENTEROLOGY | Facility: CLINIC | Age: 27
End: 2022-12-12

## 2022-12-12 NOTE — TELEPHONE ENCOUNTER
Patient scheduled for flexible sigmoidoscopy  on 12/19/22.     Discuss Covid policy.     Pre op exam scheduled: N/A    Arrival time: 1000    Facility location: [facility]; [address] [place] [place] [address]    Sedation type: Conscious sedation     Anticoagulations? No    Electronic implanted devices? No    Diabetic? No    Indication for procedure: elevated calprotectin ibd    Bowel prep recommendation: Enemas    Prep instructions sent via [facility] Bowel prep script sent to      Pre visit planning completed.    Arabella Mcmullen RN

## 2022-12-12 NOTE — TELEPHONE ENCOUNTER
Pre assessment questions completed for upcoming flexible sigmoidoscopy  procedure scheduled on 12/19/22    COVID policy reviewed.     Reviewed procedural arrival time 100 and facility location [facility]; [address] [place] [address]    Designated  policy reviewed. Instructed to have someone stay 6 hours post procedure.     Reviewed procedure prep instructions.     Patient verbalized understanding and had no questions or concerns at this time.    Arabella Mcmullen RN

## 2022-12-15 DIAGNOSIS — K51.00 ULCERATIVE PANCOLITIS WITHOUT COMPLICATION (H): ICD-10-CM

## 2022-12-16 ENCOUNTER — TELEPHONE (OUTPATIENT)
Dept: GASTROENTEROLOGY | Facility: CLINIC | Age: 27
End: 2022-12-16

## 2022-12-16 ENCOUNTER — PATIENT OUTREACH (OUTPATIENT)
Dept: GASTROENTEROLOGY | Facility: CLINIC | Age: 27
End: 2022-12-16

## 2022-12-16 DIAGNOSIS — R19.7 DIARRHEA: ICD-10-CM

## 2022-12-16 DIAGNOSIS — K51.00 ULCERATIVE PANCOLITIS WITHOUT COMPLICATION (H): Primary | ICD-10-CM

## 2022-12-16 RX ORDER — PREDNISONE 5 MG/1
TABLET ORAL
Qty: 227 TABLET | Refills: 0 | OUTPATIENT
Start: 2022-12-16

## 2022-12-16 NOTE — TELEPHONE ENCOUNTER
Caller: CIPRIANO  Reason for Reschedule/Cancellation (please be detailed, any staff messages or encounters to note?): SCHEDULE CONFLICT      Prior to reschedule please review:    Ordering Provider:RUKHSANA BAZAN    Sedation per order:MOD    Does patient have any ASC Exclusions, please identify?: NO      Notes on Cancelled Procedure:    Procedure:FLEXIBLE SIGMOIDOSCOPY [Flex Sig]     Date: 12/19/2022    Location:[facility]; [address] [place] [address]    Surgeon: BENI        Rescheduled: Yes    Procedure: FLEXIBLE SIGMOIDOSCOPY [Flex Sig]     Date: 2/27/2023    Location:[facility]; [address] [place] [address]    Surgeon: BENI    Sedation Level Scheduled  MOD,  Reason for Sedation Level ORD    Prep/Instructions updated and sent:
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
## 2022-12-16 NOTE — TELEPHONE ENCOUNTER
DIANE and sent mychart    Appt on 4/11/23 with Dr. Moeller needs to be rescheduled due to provider being unavailable that day. Reschedule with Camilo Rodriguez or Ila Kohli, return IBD next available.

## 2022-12-19 ENCOUNTER — PATIENT OUTREACH (OUTPATIENT)
Dept: GASTROENTEROLOGY | Facility: CLINIC | Age: 27
End: 2022-12-19

## 2022-12-19 DIAGNOSIS — K51.00 ULCERATIVE PANCOLITIS WITHOUT COMPLICATION (H): Primary | ICD-10-CM

## 2022-12-19 DIAGNOSIS — A04.71 RECURRENT CLOSTRIDIOIDES DIFFICILE DIARRHEA: ICD-10-CM

## 2022-12-19 RX ORDER — VANCOMYCIN HYDROCHLORIDE 125 MG/1
CAPSULE ORAL
Qty: 146 CAPSULE | Refills: 0 | Status: ON HOLD | OUTPATIENT
Start: 2022-12-19 | End: 2023-02-27

## 2022-12-19 NOTE — TELEPHONE ENCOUNTER
Vanco 125mg four time a day - then taper down to once a day for 90 days     Pred 15mg, continue slow taper.    Check if will need to reschedule the colonoscopy in February

## 2022-12-20 RX ORDER — PREDNISONE 5 MG/1
TABLET ORAL
Qty: 45 TABLET | Refills: 0 | Status: SHIPPED | OUTPATIENT
Start: 2022-12-20 | End: 2023-01-17

## 2022-12-20 NOTE — TELEPHONE ENCOUNTER
Actually ideally do the colonoscopy while still on vanco...    Per Dr Moeller so good to go with colonoscopy in February

## 2023-02-13 NOTE — TELEPHONE ENCOUNTER
Patient tested positive for C. Diff on 12.18.2023.  Pt was started on vancomycin.  How are s/sx?  Patient is currently scheduled as the second case in Dr. Moeller's schedule on 2.27.2023.    Rescheduled flexible sigmoidoscopy.    Attempted to contact patient regarding upcoming Flexible sigmoidoscopy  procedure on 2.27.2023 for pre assessment questions. No answer.     Left message to return call to [phone] #[phone]    Discuss Covid policy and designated  policy.    Pre op exam scheduled: N/A    Arrival time: 0800. Procedure time: 0845    Facility location: [facility]; [address] [place] [address]    Sedation type: Conscious sedation     Anticoagulants: No    Electronic implanted devices? No    Diabetic? No    Indication for procedure: elevated calprotectin ibd, ulcerative pancolitis    Bowel prep recommendation: Enemas     Prep instructions sent via [employer].     Jo Hurley RN  Endoscopy Procedure Pre Assessment RN

## 2023-02-17 NOTE — TELEPHONE ENCOUNTER
Pre assessment questions completed for upcoming Flexible sigmoidoscopy  procedure scheduled on 2/27/23    Per pt, she completed 14 day Vancomycin treatment for C.diff and all C.diff symptoms resolved. Per pt, Dr. Moeller recommended she proceed with additional 90 days of Vanco.    COVID policy reviewed.     Pre-op scheduled  N/A    Reviewed procedural arrival time 0800, procedure time 0845 and facility location [facility]; [address] [place] [address] [place] [address]    Designated  policy reviewed. Instructed to have someone stay 6 hours post procedure.     Reviewed procedure prep instructions.     Patient verbalized understanding and had no questions or concerns at this time.      Rhonda Taylor RN  Endoscopy Procedure Pre Assessment RN

## 2023-02-27 ENCOUNTER — TELEPHONE (OUTPATIENT)
Dept: GASTROENTEROLOGY | Facility: CLINIC | Age: 28
End: 2023-02-27

## 2023-02-27 ENCOUNTER — TELEPHONE (OUTPATIENT)
Dept: GASTROENTEROLOGY | Facility: CLINIC | Age: 28
End: 2023-02-27
Payer: COMMERCIAL

## 2023-02-27 ENCOUNTER — HOSPITAL ENCOUNTER (OUTPATIENT)
Facility: CLINIC | Age: 28
Discharge: HOME | End: 2023-02-27
Attending: INTERNAL MEDICINE | Admitting: INTERNAL MEDICINE
Payer: COMMERCIAL

## 2023-02-27 VITALS
DIASTOLIC BLOOD PRESSURE: 59 MMHG | SYSTOLIC BLOOD PRESSURE: 97 MMHG | HEIGHT: 70 IN | RESPIRATION RATE: 12 BRPM | OXYGEN SATURATION: 98 % | BODY MASS INDEX: 27.2 KG/M2 | HEART RATE: 69 BPM | WEIGHT: 190 LBS

## 2023-02-27 DIAGNOSIS — K51.00 ULCERATIVE PANCOLITIS WITHOUT COMPLICATION (H): Primary | ICD-10-CM

## 2023-02-27 LAB — FLEXIBLE SIGMOIDOSCOPY: NORMAL

## 2023-02-27 PROCEDURE — G0500 MOD SEDAT ENDO SERVICE >5YRS: HCPCS | Performed by: INTERNAL MEDICINE

## 2023-02-27 PROCEDURE — 88305 TISSUE EXAM BY PATHOLOGIST: CPT | Mod: 26 | Performed by: PATHOLOGY

## 2023-02-27 PROCEDURE — 88305 TISSUE EXAM BY PATHOLOGIST: CPT | Mod: TC | Performed by: INTERNAL MEDICINE

## 2023-02-27 PROCEDURE — 45331 SIGMOIDOSCOPY AND BIOPSY: CPT | Performed by: INTERNAL MEDICINE

## 2023-02-27 PROCEDURE — 250N000011 HC RX IP 250 OP 636: Performed by: INTERNAL MEDICINE

## 2023-02-27 RX ORDER — NALOXONE HYDROCHLORIDE 0.4 MG/ML
0.2 INJECTION, SOLUTION INTRAMUSCULAR; INTRAVENOUS; SUBCUTANEOUS
Status: DISCONTINUED | OUTPATIENT
Start: 2023-02-27 | End: 2023-02-27 | Stop reason: HOSPADM

## 2023-02-27 RX ORDER — FENTANYL CITRATE 50 UG/ML
INJECTION, SOLUTION INTRAMUSCULAR; INTRAVENOUS PRN
Status: DISCONTINUED | OUTPATIENT
Start: 2023-02-27 | End: 2023-02-27 | Stop reason: HOSPADM

## 2023-02-27 RX ORDER — ONDANSETRON 2 MG/ML
4 INJECTION INTRAMUSCULAR; INTRAVENOUS EVERY 6 HOURS PRN
Status: DISCONTINUED | OUTPATIENT
Start: 2023-02-27 | End: 2023-02-27 | Stop reason: HOSPADM

## 2023-02-27 RX ORDER — NALOXONE HYDROCHLORIDE 0.4 MG/ML
0.4 INJECTION, SOLUTION INTRAMUSCULAR; INTRAVENOUS; SUBCUTANEOUS
Status: DISCONTINUED | OUTPATIENT
Start: 2023-02-27 | End: 2023-02-27 | Stop reason: HOSPADM

## 2023-02-27 RX ORDER — ONDANSETRON 4 MG/1
4 TABLET, ORALLY DISINTEGRATING ORAL EVERY 6 HOURS PRN
Status: DISCONTINUED | OUTPATIENT
Start: 2023-02-27 | End: 2023-02-27 | Stop reason: HOSPADM

## 2023-02-27 RX ORDER — ONDANSETRON 2 MG/ML
4 INJECTION INTRAMUSCULAR; INTRAVENOUS
Status: DISCONTINUED | OUTPATIENT
Start: 2023-02-27 | End: 2023-02-27 | Stop reason: HOSPADM

## 2023-02-27 RX ORDER — VANCOMYCIN HYDROCHLORIDE 125 MG/1
125 CAPSULE ORAL 4 TIMES DAILY
COMMUNITY
End: 2023-05-30

## 2023-02-27 RX ORDER — PROCHLORPERAZINE MALEATE 10 MG
10 TABLET ORAL EVERY 6 HOURS PRN
Status: DISCONTINUED | OUTPATIENT
Start: 2023-02-27 | End: 2023-02-27 | Stop reason: HOSPADM

## 2023-02-27 RX ORDER — LIDOCAINE 40 MG/G
CREAM TOPICAL
Status: DISCONTINUED | OUTPATIENT
Start: 2023-02-27 | End: 2023-02-27 | Stop reason: HOSPADM

## 2023-02-27 RX ORDER — FLUMAZENIL 0.1 MG/ML
0.2 INJECTION, SOLUTION INTRAVENOUS
Status: DISCONTINUED | OUTPATIENT
Start: 2023-02-27 | End: 2023-02-27 | Stop reason: HOSPADM

## 2023-02-27 ASSESSMENT — ACTIVITIES OF DAILY LIVING (ADL): ADLS_ACUITY_SCORE: 35

## 2023-02-27 NOTE — H&P
"Poonam LONGORIA [facility]  [id number]  female  27 year old      Reason for procedure/surgery: Ulcerative colitis    Patient Active Problem List   Diagnosis     Ulcerative pancolitis (H)     Ulcerative pancolitis without complication (H)     Nausea and vomiting     Diarrhea       Past Surgical History:  History reviewed. No pertinent surgical history.    Past Medical History:   Past Medical History:   Diagnosis Date     Ulcerative colitis (H)        Social History:   Social History     Tobacco Use     Smoking status: Never     Smokeless tobacco: Never   Substance Use Topics     Alcohol use: Yes     Comment: Occasional       Family History: History reviewed. No pertinent family history.    Allergies:   Allergies   Allergen Reactions     Infliximab Nausea and Other (See Comments)     FACE FLUSHING WITH ITCHY/SCRATCHY THROAT.        Active Medications:   No current outpatient medications on file.       Systemic Review:   CONSTITUTIONAL: NEGATIVE for fever, chills, change in weight  ENT/MOUTH: NEGATIVE for ear, mouth and throat problems  RESP: NEGATIVE for significant cough or SOB  CV: NEGATIVE for chest pain, palpitations or peripheral edema    Physical Examination:   Vital Signs: Ht 1.778 m (5' 10\")   Wt 86.2 kg (190 lb)   BMI 27.26 kg/m    GENERAL: healthy, alert and no distress  NECK: no adenopathy, no asymmetry, masses, or scars  RESP: lungs clear to auscultation - no rales, rhonchi or wheezes  CV: regular rate and rhythm, normal S1 S2, no S3 or S4, no murmur, click or rub, no peripheral edema and peripheral pulses strong  ABDOMEN: soft, nontender, no hepatosplenomegaly, no masses and bowel sounds normal  MS: no gross musculoskeletal defects noted, no edema    ASA Classification: (II)  Mild systemic disease  Airway Exam: Mallampati Score: Class II (Complete visualization of uvula)    Plan: Appropriate to proceed as scheduled.      Theodore Moeller MD  2/27/2023    PCP:  Savanah Richard"

## 2023-02-27 NOTE — TELEPHONE ENCOUNTER
Lab order printed for Vedo level and iron and faxed to [facility] at [phone].

## 2023-02-27 NOTE — TELEPHONE ENCOUNTER
----- Message from Theodore Moeller MD sent at 2/27/2023  8:50 AM CST -----      Just need iron levels and a vedo level with her trough.;;;

## 2023-02-27 NOTE — TELEPHONE ENCOUNTER
Iron and vedo levels ordered.    [facility]  (O) [phone]  Infusion Center  [phone]  (F) [phone]    Spoke with NISA Trejo at [facility], Poonam's last infusion on 2/6/23 and her next infusion will be on 3/19/23.    [facility] GASTRO CLINIC SUPPORT,  Please fax Iron and Vedo levels to above fax#.    Please confirm receipt.    Thank you.  Nichole

## 2023-03-01 NOTE — TELEPHONE ENCOUNTER
Spoke with Karen at [facility], she states they have the vedo levels but do not have the iron lab order.    [facility] GASTRO CLINIC SUPPORT,  Please fax iron lab order to  to   [facility]  (O) [phone]  (F) [phone].    Thank you.  Nichole

## 2023-03-02 ENCOUNTER — TELEPHONE (OUTPATIENT)
Dept: GASTROENTEROLOGY | Facility: CLINIC | Age: 28
End: 2023-03-02

## 2023-03-02 NOTE — TELEPHONE ENCOUNTER
----- Message from Theodore Moeller MD sent at 3/2/2023  4:03 PM CST -----  Hey - can we send her out FMT material to review?    Also can I get her squeezed into a visit with me in March to talk about C diff management?     Thanks    B

## 2023-03-02 NOTE — TELEPHONE ENCOUNTER
March 2, 2023    Called Poonam, Left message and contact info to return call regarding: FMT materials and appt    Sent PM with above request.

## 2023-03-06 NOTE — TELEPHONE ENCOUNTER
Spoke with Poonam and relayed MD message/recommendations for iron labs and entyvio level labs and appt in March.  Discuss with pt to get iron labs done prior to appt. Offer an overbook on 3/14/23 at 9am virtual. Pt aware she needs to be in [place] for the appt. Patient acknowledged understanding and agree with plan. Answer all questions offered contact information for pt to call with any questions or concerns.    Poonam states her next infusion on 4/3 and her last infusion on 2/6. Discuss with Poonam to double check when she is getting her iron labs to make sure not get entyvio labs drawn and tho verify at her infusion to make sure she gets her entyvio level drawn prior to her infusion.    CLINIC CARE NAVIGATOR,  Please call pt to schedule pt for an appointment on 3/14/23 at 9am location [facility]. Pt request virtual. Pt aware she needs to be in [place] to have appt. Pt aware to be expecting your call to go over insurance and confirm appointment.    Please let RN know when scheduled.    Thank you.  Nichole

## 2023-03-09 ENCOUNTER — TELEPHONE (OUTPATIENT)
Dept: GASTROENTEROLOGY | Facility: CLINIC | Age: 28
End: 2023-03-09
Payer: COMMERCIAL

## 2023-03-09 DIAGNOSIS — K51.00 ULCERATIVE PANCOLITIS (H): ICD-10-CM

## 2023-03-09 DIAGNOSIS — E61.1 IRON DEFICIENCY: Primary | ICD-10-CM

## 2023-03-09 DIAGNOSIS — K51.00 ULCERATIVE PANCOLITIS WITHOUT COMPLICATION (H): ICD-10-CM

## 2023-03-09 DIAGNOSIS — R19.7 DIARRHEA, UNSPECIFIED TYPE: ICD-10-CM

## 2023-03-09 DIAGNOSIS — R11.2 NAUSEA AND VOMITING, UNSPECIFIED VOMITING TYPE: ICD-10-CM

## 2023-03-10 PROBLEM — E61.1 IRON DEFICIENCY: Status: ACTIVE | Noted: 2023-03-10

## 2023-03-10 RX ORDER — EPINEPHRINE 1 MG/ML
0.3 INJECTION, SOLUTION, CONCENTRATE INTRAVENOUS EVERY 5 MIN PRN
Status: CANCELLED | OUTPATIENT
Start: 2023-03-10

## 2023-03-10 RX ORDER — ALBUTEROL SULFATE 90 UG/1
1-2 AEROSOL, METERED RESPIRATORY (INHALATION)
Status: CANCELLED
Start: 2023-03-10

## 2023-03-10 RX ORDER — MEPERIDINE HYDROCHLORIDE 25 MG/ML
25 INJECTION INTRAMUSCULAR; INTRAVENOUS; SUBCUTANEOUS EVERY 30 MIN PRN
Status: CANCELLED | OUTPATIENT
Start: 2023-03-10

## 2023-03-10 RX ORDER — METHYLPREDNISOLONE SODIUM SUCCINATE 125 MG/2ML
125 INJECTION, POWDER, LYOPHILIZED, FOR SOLUTION INTRAMUSCULAR; INTRAVENOUS
Status: CANCELLED
Start: 2023-03-10

## 2023-03-10 RX ORDER — DIPHENHYDRAMINE HYDROCHLORIDE 50 MG/ML
50 INJECTION INTRAMUSCULAR; INTRAVENOUS
Status: CANCELLED
Start: 2023-03-10

## 2023-03-10 RX ORDER — ALBUTEROL SULFATE 0.83 MG/ML
2.5 SOLUTION RESPIRATORY (INHALATION)
Status: CANCELLED | OUTPATIENT
Start: 2023-03-10

## 2023-03-10 NOTE — TELEPHONE ENCOUNTER
----- Message from Theodore Moeller MD sent at 3/9/2023 12:37 PM CST -----  Hey    Iron infusions please and thank you

## 2023-03-10 NOTE — TELEPHONE ENCOUNTER
Spoke with Poonam and relayed MD message/recommendations for venofer infusion.  Patient acknowledged understanding and agree with plan. Answer all questions offered contact information for pt to call with any questions or concerns.      RV:  3/14/23  LV: 10/11/22    New Therapy Plan:  Venofer 200mg x5    Outside facility: Outside do their own PA  Facility: [facility]  [phone] (f) [phone]    Hello Team,  Please fax Therapy Plan, labs and recent clinic notes to outside infusion facility.    Please confirm receipt of the fax.    Thank you.  Nichole

## 2023-03-13 NOTE — TELEPHONE ENCOUNTER
----- Message from Marilynn Carrillo sent at 3/12/2023 12:58 PM CDT -----  Regarding: Infusion  Looks like Poonam will be doing her Venofer at an Outside facility: Outside do their own PA  Facility:     Could the Infusion Appointment Request be taken out of the therapy plan, so it would not fall into the [facility] workque?     Thank you,  Marilynn PECK [facility] - [facility]  [facility] Scheduling  [phone] (option 3 then option 2)  [facility] Scheduling   [phone] (option 5 then option 2)

## 2023-03-14 ENCOUNTER — TELEPHONE (OUTPATIENT)
Dept: GASTROENTEROLOGY | Facility: CLINIC | Age: 28
End: 2023-03-14

## 2023-03-14 ENCOUNTER — VIRTUAL VISIT (OUTPATIENT)
Dept: GASTROENTEROLOGY | Facility: CLINIC | Age: 28
End: 2023-03-14
Payer: COMMERCIAL

## 2023-03-14 DIAGNOSIS — A04.71 RECURRENT CLOSTRIDIOIDES DIFFICILE DIARRHEA: ICD-10-CM

## 2023-03-14 DIAGNOSIS — K51.00 ULCERATIVE PANCOLITIS (H): Primary | ICD-10-CM

## 2023-03-14 PROCEDURE — 99214 OFFICE O/P EST MOD 30 MIN: CPT | Mod: VID | Performed by: INTERNAL MEDICINE

## 2023-03-14 ASSESSMENT — ENCOUNTER SYMPTOMS
POOR WOUND HEALING: 0
NAIL CHANGES: 0
SKIN CHANGES: 0

## 2023-03-14 NOTE — PROGRESS NOTES
Video-Visit Details    Type of service:  Video Visit    Video Start Time (time video started): 9:01 AM    Video End Time (time video stopped): 9:21 AM    Originating Location (pt. Location): Home    Distant Location (provider location):  On-site    Mode of Communication:  Video Conference via [employer]

## 2023-03-14 NOTE — PROGRESS NOTES
IBD CLINIC VISIT     ASSESSMENT/PLAN  27 year old reji with Ulcerative pancolitis     1. Ulcerative colitis, pancolitis:   Current UC medications:   - Vedolizumab (Fall 2022)  Current clinical disease activity: in remission  Last endoscopic disease activity: eMayo: 0, 2/27/23 flex sig    Now in clinical and endoscopic remission. Plan to continue vedolizumab for maintenance of remission.     -- Vedolizumab maintenance infusions  -- Labs every 3 months: CBC, LFTs, CRP, ESR    2. Iron deficiency anemia, low on 2/2021, resolved on 6/2021 recheck  Not currently on iron supplement after last recheck. Celiac serology negative. Could be slow occult bleeding from pseudopolyps.  -- Trend for anemia  -- Recommend iron supplements     3. History of C Diff infection: 2018 and 2022. As she is now in clinical and endoscopic remission will plan on FMT to get her off vancomycin    -- Office visit with [facility] for FMT via capsules.     4. Dysplasia surveillance: Last colonoscopy 6/2019. Start at 8 years after IBD diagnosis.  -- Surveillance colonoscopy in fall 2023 / winter 2024.     Return to clinic in 6 months.    Thank you for this consultation.  It was a pleasure to participate in the care of this patient; please contact us with any further questions.  A total of 40 minutes was spent with this patient on the date of the encounter, 3/29/2023, in the following care activities: Chart review, patient care and documentation.      Theodore Moeller MD MS    [facility]  Inflammatory Bowel Disease Program  Division of Gastroenterology, Hepatology and Nutrition  Pager: [phone]    IBD HISTORY  Age at diagnosis: 20  Extent of disease: pancolitis  Prior UC surgeries:  Prior IBD Medications:  - Canasa  - Infliximab 5mg/kg (400mg) q6  - Azathioprine 150mg (sepetmebr 2018 - Summer 2022) - de-escalated due to   - Adalimumab weekly (started Nov 2018, dose escalated Nov 2019)     DRUG MONITORING  TPMT enzyme activity:       6-TGN/6-MMPN levels:  7/15/20: 6-TGN: 137, 6MMPN 3888     Biologic concentration:  7/3/18: IFX: <1, Ab 30 (normal < 50) - Rocha test (q8 weeks, monotherapy)  18: IFX: Not detected, antibodies: 14     11/15/19: Adalimumab: 3.2, no antibodies     HPI:   Here for follow-up.   Questions about FMT and iron infusions.     She is feeling well. Some fatigue.     Having 2-3 stools per day. Formed. No blood. No abdominal pain, N/V.     No EIM of IBD.     Rocha score:   Stool freq: 0 (baseline stools frequency)  Rectal bleedin (None)  PGA: 0 (normal)  Endoscopy: 0 (normal mucosa)    Remission: <3   Mild disease: 3-5  Moderate disease: 6-10  Severe disease: >10    Extra intestinal manifestations of IBD:  No uveitis/episcleritis  No: aphthous ulcers   No arthritis   No erythema nodosum/pyoderma gangrenosum.     sIBDQ:  IBDQ Score Date IBDQ - Total Score  (Higher score better)   3/14/2023 62   11/15/2019 57        ROS:    Constitutional, HEENT, cardiovascular, pulmonary, GI, , musculoskeletal, neuro, skin, endocrine and psych systems are negative, except as otherwise noted.    PHYSICAL EXAMINATION:  PHYSICAL EXAMINATION:  Constitutional: aaox3, cooperative, pleasant, not dyspneic/diaphoretic, no acute distress  Vitals reviewed: There were no vitals taken for this visit.  Wt:   Wt Readings from Last 2 Encounters:   23 86.2 kg (190 lb)   22 77.1 kg (170 lb)      Eyes: Sclera anicteric/injected  Ears/nose/mouth/throat: Normal oropharynx without ulcers or exudate, mucus membranes moist, hearing intact  Neck: supple, thyroid normal size  CV: No edema  Respiratory: Unlabored breathing  Lymph: No axillary, submandibular, supraclavicular or inguinal lymphadenopathy  Abd: Nondistended, +bs, no hepatosplenomegaly, nontender, no peritoneal signs  Skin: warm, perfused, no jaundice  Psych: Normal affect  MSK: Normal gait      PERTINENT PAST MEDICAL HISTORY:  Past Medical History:   Diagnosis Date     Ulcerative  colitis (H)        PREVIOUS SURGERIES:  No past surgical history on file.    ALLERGIES:     Allergies   Allergen Reactions     Infliximab Nausea and Other (See Comments)     FACE FLUSHING WITH ITCHY/SCRATCHY THROAT.        PERTINENT MEDICATIONS:    Current Outpatient Medications:      EPINEPHrine (ANY BX GENERIC EQUIV) 0.3 MG/0.3ML injection 2-pack, Inject 0.3 mLs (0.3 mg) into the muscle as needed for anaphylaxis May repeat one time in 5-15 minutes if response to initial dose is inadequate., Disp: 2 each, Rfl: 0     ondansetron (ZOFRAN) 4 MG tablet, Take 1 tablet (4 mg) by mouth every 6 hours as needed for nausea or vomiting, Disp: 20 tablet, Rfl: 1     UCERIS 2 MG/ACT FOAM, , Disp: , Rfl:      vancomycin (VANCOCIN) 125 MG capsule, Take 125 mg by mouth 4 times daily, Disp: , Rfl:     SOCIAL HISTORY:  Social History     Socioeconomic History     Marital status:      Spouse name: Not on file     Number of children: Not on file     Years of education: Not on file     Highest education level: Not on file   Occupational History     Not on file   Tobacco Use     Smoking status: Never     Smokeless tobacco: Never   Vaping Use     Vaping Use: Never used   Substance and Sexual Activity     Alcohol use: Yes     Comment: Occasional     Drug use: Not Currently     Sexual activity: Not on file   Other Topics Concern     Not on file   Social History Narrative     Not on file     Social Determinants of Health     Financial Resource Strain: Not on file   Food Insecurity: Not on file   Transportation Needs: Not on file   Physical Activity: Not on file   Stress: Not on file   Social Connections: Not on file   Intimate Partner Violence: Not on file   Housing Stability: Not on file     FAMILY HISTORY:  No FH of IBD/CRC.  No family history on file.    Past/family/social history reviewed and no changes

## 2023-03-14 NOTE — NURSING NOTE
Is the patient currently in the state of [place]? YES    Visit mode:VIDEO    If the visit is dropped, the patient can be reconnected by: VIDEO VISIT: Text to cell phone: [phone]    Will anyone else be joining the visit? NO      How would you like to obtain your AVS? MyChart    Are changes needed to the allergy or medication list? NO    Reason for visit: Follow up, wants to talk about medicine options.

## 2023-03-14 NOTE — TELEPHONE ENCOUNTER
[facility] Call Center    Phone Message    May a detailed message be left on voicemail: yes     Reason for Call: Other: Karen with the [facility] is calling in asking for a call back. She states that they would like to discuss orders for the patient to get Iron. Please call back to discuss.     Action Taken: Message routed to:  [facility] GI    Travel Screening: Not Applicable

## 2023-03-14 NOTE — LETTER
3/14/2023         RE: Poonam Berg  [address] [facility] [address]        Dear Colleague,    Thank you for referring your patient, Poonam Berg, to the [facility]. Please see a copy of my visit note below.      IBD CLINIC VISIT     ASSESSMENT/PLAN  27 year old reji with Ulcerative pancolitis     1. Ulcerative colitis, pancolitis:   Current UC medications:   - Vedolizumab (Fall 2022)  Current clinical disease activity: in remission  Last endoscopic disease activity: eMayo: 0, 2/27/23 flex sig    Now in clinical and endoscopic remission. Plan to continue vedolizumab for maintenance of remission.     -- Vedolizumab maintenance infusions  -- Labs every 3 months: CBC, LFTs, CRP, ESR    2. Iron deficiency anemia, low on 2/2021, resolved on 6/2021 recheck  Not currently on iron supplement after last recheck. Celiac serology negative. Could be slow occult bleeding from pseudopolyps.  -- Trend for anemia  -- Recommend iron supplements     3. History of C Diff infection: 2018 and 2022. As she is now in clinical and endoscopic remission will plan on FMT to get her off vancomycin    -- Office visit with [facility] for FMT via capsules.     4. Dysplasia surveillance: Last colonoscopy 6/2019. Start at 8 years after IBD diagnosis.  -- Surveillance colonoscopy in fall 2023 / winter 2024.     Return to clinic in 6 months.    Thank you for this consultation.  It was a pleasure to participate in the care of this patient; please contact us with any further questions.  A total of 40 minutes was spent with this patient on the date of the encounter, 3/29/2023, in the following care activities: Chart review, patient care and documentation.      Theodore Moeller MD MS    [facility]  Inflammatory Bowel Disease Program  Division of Gastroenterology, Hepatology and Nutrition  Pager: [phone]    IBD HISTORY  Age at diagnosis: 20  Extent of disease: pancolitis  Prior UC  surgeries:  Prior IBD Medications:  - Canasa  - Infliximab 5mg/kg (400mg) q6  - Azathioprine 150mg (2018 - Summer 2022) - de-escalated due to   - Adalimumab weekly (started 2018, dose escalated 2019)     DRUG MONITORING  TPMT enzyme activity:      6-TGN/6-MMPN levels:  7/15/20: 6-TGN: 137, 6MMPN 3888     Biologic concentration:  7/3/18: IFX: <1, Ab 30 (normal < 50) - Rocha test (q8 weeks, monotherapy)  18: IFX: Not detected, antibodies: 14     11/15/19: Adalimumab: 3.2, no antibodies     HPI:   Here for follow-up.   Questions about FMT and iron infusions.     She is feeling well. Some fatigue.     Having 2-3 stools per day. Formed. No blood. No abdominal pain, N/V.     No EIM of IBD.     Rocha score:   Stool freq: 0 (baseline stools frequency)  Rectal bleedin (None)  PGA: 0 (normal)  Endoscopy: 0 (normal mucosa)    Remission: <3   Mild disease: 3-5  Moderate disease: 6-10  Severe disease: >10    Extra intestinal manifestations of IBD:  No uveitis/episcleritis  No: aphthous ulcers   No arthritis   No erythema nodosum/pyoderma gangrenosum.     sIBDQ:  IBDQ Score Date IBDQ - Total Score  (Higher score better)   3/14/2023 62   11/15/2019 57        ROS:    Constitutional, HEENT, cardiovascular, pulmonary, GI, , musculoskeletal, neuro, skin, endocrine and psych systems are negative, except as otherwise noted.    PHYSICAL EXAMINATION:  PHYSICAL EXAMINATION:  Constitutional: aaox3, cooperative, pleasant, not dyspneic/diaphoretic, no acute distress  Vitals reviewed: There were no vitals taken for this visit.  Wt:   Wt Readings from Last 2 Encounters:   23 86.2 kg (190 lb)   22 77.1 kg (170 lb)      Eyes: Sclera anicteric/injected  Ears/nose/mouth/throat: Normal oropharynx without ulcers or exudate, mucus membranes moist, hearing intact  Neck: supple, thyroid normal size  CV: No edema  Respiratory: Unlabored breathing  Lymph: No axillary, submandibular, supraclavicular or inguinal  lymphadenopathy  Abd: Nondistended, +bs, no hepatosplenomegaly, nontender, no peritoneal signs  Skin: warm, perfused, no jaundice  Psych: Normal affect  MSK: Normal gait      PERTINENT PAST MEDICAL HISTORY:  Past Medical History:   Diagnosis Date     Ulcerative colitis (H)        PREVIOUS SURGERIES:  No past surgical history on file.    ALLERGIES:     Allergies   Allergen Reactions     Infliximab Nausea and Other (See Comments)     FACE FLUSHING WITH ITCHY/SCRATCHY THROAT.        PERTINENT MEDICATIONS:    Current Outpatient Medications:      EPINEPHrine (ANY BX GENERIC EQUIV) 0.3 MG/0.3ML injection 2-pack, Inject 0.3 mLs (0.3 mg) into the muscle as needed for anaphylaxis May repeat one time in 5-15 minutes if response to initial dose is inadequate., Disp: 2 each, Rfl: 0     ondansetron (ZOFRAN) 4 MG tablet, Take 1 tablet (4 mg) by mouth every 6 hours as needed for nausea or vomiting, Disp: 20 tablet, Rfl: 1     UCERIS 2 MG/ACT FOAM, , Disp: , Rfl:      vancomycin (VANCOCIN) 125 MG capsule, Take 125 mg by mouth 4 times daily, Disp: , Rfl:     SOCIAL HISTORY:  Social History     Socioeconomic History     Marital status:      Spouse name: Not on file     Number of children: Not on file     Years of education: Not on file     Highest education level: Not on file   Occupational History     Not on file   Tobacco Use     Smoking status: Never     Smokeless tobacco: Never   Vaping Use     Vaping Use: Never used   Substance and Sexual Activity     Alcohol use: Yes     Comment: Occasional     Drug use: Not Currently     Sexual activity: Not on file   Other Topics Concern     Not on file   Social History Narrative     Not on file     Social Determinants of Health     Financial Resource Strain: Not on file   Food Insecurity: Not on file   Transportation Needs: Not on file   Physical Activity: Not on file   Stress: Not on file   Social Connections: Not on file   Intimate Partner Violence: Not on file   Housing Stability:  Not on file     FAMILY HISTORY:  No FH of IBD/CRC.  No family history on file.    Past/family/social history reviewed and no changes        Sincerely,    Theodorekendall Moeller MD

## 2023-03-15 NOTE — TELEPHONE ENCOUNTER
Faxed Venofer Therapy Plan to [facility]     Fax #: [phone]   Attn: Karen DELANEY

## 2023-03-15 NOTE — TELEPHONE ENCOUNTER
[facility] GASTRO CLINIC SUPPORT,  Please fax Venofer therapy plan to [facility] at [phone] attmarky Jones.    Thank you.  Nichole

## 2023-03-29 DIAGNOSIS — K51.00 ULCERATIVE PANCOLITIS (H): Primary | ICD-10-CM

## 2023-03-29 NOTE — TELEPHONE ENCOUNTER
REFERRAL INFORMATION:    Referring Provider: N/A    Referring Clinic: N/A    Reason for Visit/Diagnosis: IMT Capsule     FUTURE VISIT INFORMATION:    Appointment Date: 4/18/2023    Appointment Time: 9 AM     NOTES STATUS DETAILS   OFFICE NOTE from Referring Provider N/A    OFFICE NOTE from Other Specialist Care Everywhere / Internal [facility]:  3/14/23, 10/11/22 - GI OV with Dr. Moeller    [facility]:  2/15/21 - PCC OV with Elinor Rowna NP  6/11/19 - GI OV with Zelda Jara NP   HOSPITAL DISCHARGE SUMMARY/  ED VISITS Care Everywhere [facility]:  7/15/18 - ED OV WITH Dr. Barrientos  6/26/18 - Admission with Dr. Villanueva  6/23/18 - ED OV with Dr. Juan  11/30/16 - Admission with Dr. Sierra   OPERATIVE REPORT N/A    MEDICATION LIST Internal         ENDOSCOPY  Care Everywhere [facility]:  12/3/16 - EGD   COLONOSCOPY Care Everywhere / Received [facility]:  6/3/19 - Colonoscopy    [facility]:  7/7/17 - Colonoscopy   ERCP N/A    EUS N/A    STOOL TESTING N/A    PERTINENT LABS N/A    PATHOLOGY REPORTS (RELATED) Internal [facility]:  2/27/23 - Colon Biopsy (Case: [id number])  9/13/21 - Colon Biopsy (Case: [id number])  6/2/19 - Colon Biopsy (Case: [id number])   IMAGING (CT, MRI, EGD, MRCP, Small Bowel Follow Through/SBT, MR/CT Enterography) Care everywhere - recvd  [facility]:  7/15/18, 3/3/16 - XR Abdomen  6/26/18 - CT Abd/Pelvis  3/23/16 - MRI Abdomen  3/23/16 - MRI Pelvis     Records Requested    Facility  [facility] [place]  Fax: [phone]   Outcome * 3/29/23 10:22 AM Faxed urg req to [facility] for images to be pushed to [facility] PAC[facility]. - Unique

## 2023-03-30 ENCOUNTER — TELEPHONE (OUTPATIENT)
Dept: GASTROENTEROLOGY | Facility: CLINIC | Age: 28
End: 2023-03-30
Payer: COMMERCIAL

## 2023-03-30 NOTE — TELEPHONE ENCOUNTER
"DIANE and babs sent (1st attempt) regarding GI clinic follow up    Schedule:  \"Return IBD\" visit with Camilo Rodriguez or Ila Kohli for 6 mo IBD follow up (around 9/29/23) per Dr. Moeller  "

## 2023-04-03 ENCOUNTER — MYC MEDICAL ADVICE (OUTPATIENT)
Dept: GASTROENTEROLOGY | Facility: CLINIC | Age: 28
End: 2023-04-03
Payer: COMMERCIAL

## 2023-04-05 ENCOUNTER — TELEPHONE (OUTPATIENT)
Dept: GASTROENTEROLOGY | Facility: CLINIC | Age: 28
End: 2023-04-05
Payer: COMMERCIAL

## 2023-04-05 DIAGNOSIS — K51.00 ULCERATIVE PANCOLITIS (H): Primary | ICD-10-CM

## 2023-04-05 NOTE — TELEPHONE ENCOUNTER
----- Message from Karen Mann sent at 4/5/2023  2:12 PM CDT -----  Regarding: Dr. Moeller patient asking for referral  Hello, this patient reached out to endoscopy scheduling to get a colonoscopy done in the fall time with Dr. Moeller. She states they discussed her having one at their last visit in 03/2023. I looked in clinic notes and AVS and do not see recommendations for this patient to have a colonoscopy. Will he be placing an order for this patient?

## 2023-04-06 NOTE — TELEPHONE ENCOUNTER
Spoke with Poonam, pt mentioned that at her last visit with Dr. Moeller 3/14/23:   4. Dysplasia surveillance: Last colonoscopy 6/2019. Start at 8 years after IBD diagnosis.  -- Surveillance colonoscopy in fall 2023 / winter 2024.     She would like to schedule for this fall.    Order GI Procedure referral for colonoscopy.

## 2023-04-10 NOTE — TELEPHONE ENCOUNTER
Called to remind patient of their upcoming appointment with our GI clinic, on Tuesday 4/18/2023 at 8:45AM with Dr. Andrew Krueger. This appointment is scheduled as an in-person appt. Please arrive 15 minutes early to check in for your appointment. , if your appointment is virtual (video or telephone) you need to be in [place] for the visit. To reschedule or cancel patient to call [phone].    Danette Henning MA

## 2023-04-11 ENCOUNTER — TELEPHONE (OUTPATIENT)
Dept: GASTROENTEROLOGY | Facility: CLINIC | Age: 28
End: 2023-04-11
Payer: COMMERCIAL

## 2023-04-11 NOTE — TELEPHONE ENCOUNTER
Screening Questions  BLUE  KIND OF PREP RED  LOCATION [review exclusion criteria] GREEN  SEDATION TYPE        Y Are you active on mychart?       RUKHSANA BAZAN Ordering/Referring Provider?        BCBS What type of coverage do you have?      N Have you had a positive covid test in the last 14 days?     27.3 1. BMI  [BMI 40+ - review exclusion criteria]    Y  2. Are you able to give consent for your medical care? [IF NO,RN REVIEW]          N  3. Are you taking any prescription pain medications on a routine schedule   (ex narcotics: oxycodone, roxicodone, oxycontin,  and percocet)? [RN Review]          3a. EXTENDED PREP What kind of prescription?     N 4. Do you have any chemical dependencies such as alcohol, street drugs, or methadone?        **If yes 3- 5 , please schedule with MAC sedation.**          IF YES TO ANY 6 - 10 - HOSPITAL SETTING ONLY.     N 6.   Do you need assistance transferring?     N 7.   Have you had a heart or lung transplant?    N 8.   Are you currently on dialysis?   N 9.   Do you use daily home oxygen?   N 10. Do you take nitroglycerin?   10a.  If yes, how often?     11. [FEMALES]  N Are you currently pregnant?    11a.  If yes, how many weeks? [ Greater than 12 weeks, OR NEEDED]    N 12. Do you have Pulmonary Hypertension? *NEED PAC APPT AT [facility] w/ MAC*     N 13. [review exclusion criteria]  Do you have any implantable devices in your body (pacemaker, defib, LVAD)?    N 14. In the past 6 months, have you had any heart related issues including cardiomyopathy or heart attack?     14a.  If yes, did it require cardiac stenting if so when?     N 15. Have you had a stroke or Transient ischemic attack (TIA - aka  mini stroke ) within 6 months?      N 16. Do you have mod to severe Obstructive Sleep Apnea?  [Hospital only]    N 17. Do you have SEVERE AND UNCONTROLLED asthma? *NEED PAC APPT AT [facility] w/MAC*     18. Are you currently taking any blood thinners?     18a. No. Continue to  "19.   18b.    N 19. Do you take the medication Phentermine?    19a. If yes, \"Hold for 7 days before procedure.  Please consult your prescribing provider if you have questions about holding this medication.\"     N  20. Do you have chronic kidney disease?      N  21. Do you have a diagnosis of diabetes?     N  22. On a regular basis do you go 3-5 days between bowel movements?      23. Preferred LOCAL Pharmacy for Pre Prescription    [ LIST ONLY ONE PHARMACY]          [facility]        - CLOSING REMINDERS -    Informed patient they will need an adult    Cannot take any type of public or medical transportation alone    Conscious Sedation- Needs  for 6 hours after the procedure       MAC/General-Needs  for 24 hours after procedure    Pre-Procedure Covid test to be completed [[facility] PCR Testing Required]    Confirmed Nurse will call to complete assessment       - SCHEDULING DETAILS -  N Hospital Setting Required? If yes, what is the exclusion?:    MARCIA  Surgeon    6/27  Date of Procedure  Lower Endoscopy [Colonoscopy]  Type of Procedure Scheduled  [facility] Location   MIRALAX GATORADE WITHOUT MAGNEISUM CITRATE Which Colonoscopy Prep was Sent?       CS Sedation Type     N PAC / Pre-op Required                 "

## 2023-04-17 ENCOUNTER — MYC MEDICAL ADVICE (OUTPATIENT)
Dept: GASTROENTEROLOGY | Facility: CLINIC | Age: 28
End: 2023-04-17
Payer: COMMERCIAL

## 2023-04-18 ENCOUNTER — OFFICE VISIT (OUTPATIENT)
Dept: GASTROENTEROLOGY | Facility: CLINIC | Age: 28
End: 2023-04-18
Payer: COMMERCIAL

## 2023-04-18 ENCOUNTER — PRE VISIT (OUTPATIENT)
Dept: GASTROENTEROLOGY | Facility: CLINIC | Age: 28
End: 2023-04-18

## 2023-04-18 VITALS
OXYGEN SATURATION: 99 % | HEART RATE: 85 BPM | DIASTOLIC BLOOD PRESSURE: 69 MMHG | HEIGHT: 70 IN | WEIGHT: 195.9 LBS | BODY MASS INDEX: 28.05 KG/M2 | SYSTOLIC BLOOD PRESSURE: 108 MMHG

## 2023-04-18 DIAGNOSIS — A04.71 RECURRENT CLOSTRIDIOIDES DIFFICILE DIARRHEA: Primary | ICD-10-CM

## 2023-04-18 PROCEDURE — 99212 OFFICE O/P EST SF 10 MIN: CPT | Performed by: INTERNAL MEDICINE

## 2023-04-18 ASSESSMENT — PAIN SCALES - GENERAL: PAINLEVEL: NO PAIN (0)

## 2023-04-18 NOTE — NURSING NOTE
"No chief complaint on file.      Vitals:    04/18/23 0846   BP: 108/69   Pulse: 85   SpO2: 99%   Weight: 88.9 kg (195 lb 14.4 oz)   Height: 1.778 m (5' 10\")       Body mass index is 28.11 kg/m .    [facility] C.Diff Questionnaire     Please rate your symptoms from 0 to 100 based on severity  0-19 None or minimal  20-39 Not very severe  40-59 Quite severe  60-79 Severe   Very severe    1. How severe were your abdominal pains/cramps over the past week? 0-19 None or Minimal     2. How severe is your abdominal pain at this time? 0-19 None or Minimal     3. If you have abdominal distension, how severe is it (over the past week)? 0-19 None or Minimal     4. How satisfied are you with your bowel habits? 0-19 None or Minimal     5. How much do your abdominal symptoms interfere with your life in general? 0-19 None or Minimal       In addition, please answer these questions:    1. How many bowel movements do you have per day (average over the past week)? 2-3    0-1    1-2    2-3    3-5    6-10    > 10    2. What is the consistency of your stools? Formed    Watery    Loose    Formed    Hard    3. Did you have any episodes of fecal incontinence in the past week? No    4. Please indicate if you have any of these symptoms: None    Bloating/gas    Upper abdominal pain    Lack of appetite    Nausea and/or vomiting    Diarrhea    Constipation        "

## 2023-04-18 NOTE — LETTER
2023         RE: Poonam Berg  [address] [facility] [address]        Dear Colleague,    Thank you for referring your patient, Poonam Berg, to the [facility] GASTROENTEROLOGY CLINIC [place]. Please see a copy of my visit note below.    Note dictated.        Visit Date: 2023    HISTORY OF PRESENT ILLNESS:  The patient is a 27-year-old woman with history of ulcerative pancolitis that was complicated by recurrent C. difficile infections.  Her colitis has been brought under excellent control with endoscopic Rocha score of 0 on a flexible sigmoidoscopy done on  of this year.  She was maintained on vancomycin at a suppressive dose while her colitis treatment was being optimized.  She now has discontinued vancomycin 2 days ago and came in for a microbiota transplant treatment.    The risks and benefits were described in detail.  The patient signed a consent form.  Three capsules containing total of 5.6 x 10 to the 11th bacteria were administered, unit #[id number].    The patient was counseled regarding diet and future antibiotic provocations. Her followup is in 2 months.    Total time spent in this visit, including review of medical records, documentation and coordination of care, all done on the day of the visit was 15 minutes.    Gabino Krueger MD        D: 2023   T: 2023   MT: LA    Name:     POONAM BERG  MRN:      [id number]        Account:    [id number]   :      1995           Visit Date: 2023     Document: [id number]       Again, thank you for allowing me to participate in the care of your patient.      Sincerely,    Gabino Krueger MD

## 2023-04-18 NOTE — PROGRESS NOTES
Visit Date: 2023    HISTORY OF PRESENT ILLNESS:  The patient is a 27-year-old woman with history of ulcerative pancolitis that was complicated by recurrent C. difficile infections.  Her colitis has been brought under excellent control with endoscopic Rocha score of 0 on a flexible sigmoidoscopy done on  of this year.  She was maintained on vancomycin at a suppressive dose while her colitis treatment was being optimized.  She now has discontinued vancomycin 2 days ago and came in for a microbiota transplant treatment.    The risks and benefits were described in detail.  The patient signed a consent form.  Three capsules containing total of 5.6 x 10 to the 11th bacteria were administered, unit #[id number].    The patient was counseled regarding diet and future antibiotic provocations. Her followup is in 2 months.    Total time spent in this visit, including review of medical records, documentation and coordination of care, all done on the day of the visit was 15 minutes.    Gabino Krueger MD        D: 2023   T: 2023   MT: LA    Name:     CIPRIANO WUCrystal  MRN:      [id number]        Account:    [id number]   :      1995           Visit Date: 2023     Document: [id number]

## 2023-04-20 ENCOUNTER — PATIENT OUTREACH (OUTPATIENT)
Dept: GASTROENTEROLOGY | Facility: CLINIC | Age: 28
End: 2023-04-20
Payer: COMMERCIAL

## 2023-04-20 ENCOUNTER — TELEPHONE (OUTPATIENT)
Dept: GASTROENTEROLOGY | Facility: CLINIC | Age: 28
End: 2023-04-20
Payer: COMMERCIAL

## 2023-04-20 DIAGNOSIS — K51.00 ULCERATIVE PANCOLITIS (H): ICD-10-CM

## 2023-04-20 DIAGNOSIS — A04.71 RECURRENT CLOSTRIDIOIDES DIFFICILE DIARRHEA: Primary | ICD-10-CM

## 2023-04-20 NOTE — TELEPHONE ENCOUNTER
Labs ordered 4/20/23 by Dr. Moeller; enteric, hepatic, ESR, CRP, CBC, C diff, calprotectin printed and faxed to [facility] at [phone]. Will follow up tomorrow if they received.     Verified by care everywhere they got the lab order. Siva Varela informed.     Labs back and in chart. Nurse Varela informed.

## 2023-04-20 NOTE — PROGRESS NOTES
Poonam Gonzalez called she states she was here last week to take the IMT pills. Then on Friday 4/14/23 she notice increase BM 4/day loose stools. Now she has about 10-15 BM/day loose/liquid stools. Some with blood on toilet paper. The blood started on Wednesday. Poonam reports urgency, cramping prior to BM and relief after BM. No other abdominal discomfort. No other gi symptoms. Pt is concern this might be her c diff recurrence.    RN ordered flare labs (CBC, CRP, ESR, LFTs, fcal, c diff and enteric panel)    Pt is asking for any recommendations.      Thank you.  Nichole

## 2023-04-20 NOTE — PROGRESS NOTES
[facility] [place]  (O) [phone]  Infusion Center  [phone]  (F) [phone]    [facility] GASTRO CLINIC SUPPORT,  Please fax flare labs order today to [facility].    Please confirm receipt and let RN know.    Thank you.  Nichole

## 2023-04-24 NOTE — PROGRESS NOTES
Poonam called, she completed her labs and some were resulted. She is concerned about the lab results.    April 24, 2023    Called Poonam, Left message and contact info to return call regarding: lab results

## 2023-04-24 NOTE — TELEPHONE ENCOUNTER
Called to remind patient of their upcoming appointment with our GI clinic, on Tuesday 5/2/2023 at 11:45AM with Jess Watson. This appointment is scheduled as a video visit. You will receive a call approximately 30 minutes prior to check you in, you must be in [place] for this visit., if your appointment is virtual (video or telephone) you need to be in [place] for the visit. To reschedule or cancel patient to call [phone].    Danette Henning MA

## 2023-04-24 NOTE — PROGRESS NOTES
Cassy, mother of Poonam called, she states Poonam wanted her to check in with Dr. Moeller about the lab results.    Dr. Moeller,  Flare lab results in care everywhere. Off most concern is below fcal results.      Poonam is asking for next step if escalation of Entyvio is recommended.    Poonam is starting a new job and does not have any time off available. She is concern with recent flare symptoms and it's impact on her life and work.    Thank you.  Nichole

## 2023-04-25 ENCOUNTER — TELEPHONE (OUTPATIENT)
Dept: GASTROENTEROLOGY | Facility: CLINIC | Age: 28
End: 2023-04-25
Payer: COMMERCIAL

## 2023-04-25 ENCOUNTER — MYC MEDICAL ADVICE (OUTPATIENT)
Dept: GASTROENTEROLOGY | Facility: CLINIC | Age: 28
End: 2023-04-25
Payer: COMMERCIAL

## 2023-04-25 DIAGNOSIS — K51.00 ULCERATIVE PANCOLITIS (H): ICD-10-CM

## 2023-04-25 DIAGNOSIS — A04.71 RECURRENT CLOSTRIDIOIDES DIFFICILE DIARRHEA: Primary | ICD-10-CM

## 2023-04-25 RX ORDER — BUDESONIDE 3 MG/1
CAPSULE, COATED PELLETS ORAL
Qty: 132 CAPSULE | Refills: 0 | Status: SHIPPED | OUTPATIENT
Start: 2023-04-25 | End: 2023-05-30

## 2023-04-25 NOTE — TELEPHONE ENCOUNTER
----- Message from Danette Henning MA sent at 4/24/2023  8:51 AM CDT -----  Regarding: Appointments  Prateek Varela,    When you have a chance.    Can you please help patient with scheduling.    Patient is scheduled to see many GI providers and she is not sure what appointments she should attend to and what what they are for. Patient seems very lost.    Please let us know what we can do to help.    Thank you so much!  Danette SRIVASTAVA

## 2023-04-25 NOTE — TELEPHONE ENCOUNTER
----- Message from Theodore Moeller MD sent at 4/25/2023 12:26 PM CDT -----  Hi    Can you see if she responded to budesonide in past?    If so, then lets do budesonide 9mg daily x 30 days, then decrease by 3mg every 14 days    If not, then prednisone 40mg x 2 weeks, then 30mg x 7 days, then 20mg x 7 days, then decrease by 5mg every 7 days

## 2023-04-25 NOTE — TELEPHONE ENCOUNTER
Spoke with Poonam and relayed MD message/recommendations regarding budesonide. Poonam would like to start budesonide. She states she has taken in past and it has help.    Patient acknowledged understanding and agree with plan. Answer all questions offered contact information for pt to call with any questions or concerns.    Order budesonide.

## 2023-04-25 NOTE — TELEPHONE ENCOUNTER
Spoke with Poonam, discuss appointments, pt all set she is aware appointments with [facility] Grace and Dee is regarding her IMT.

## 2023-05-05 ENCOUNTER — PATIENT OUTREACH (OUTPATIENT)
Dept: GASTROENTEROLOGY | Facility: CLINIC | Age: 28
End: 2023-05-05
Payer: COMMERCIAL

## 2023-05-05 DIAGNOSIS — K51.00 ULCERATIVE PANCOLITIS WITHOUT COMPLICATION (H): Primary | ICD-10-CM

## 2023-05-05 RX ORDER — PREDNISONE 5 MG/1
TABLET ORAL
Qty: 227 TABLET | Refills: 0 | Status: SHIPPED | OUTPATIENT
Start: 2023-05-05 | End: 2023-06-06

## 2023-05-05 NOTE — TELEPHONE ENCOUNTER
Updated mother per patient request.     Contacted [facility] to discuss changing Entyvio to every 4 weeks   [place] navigator will start the process and will keep us posted   My direct number was given to the team

## 2023-05-05 NOTE — TELEPHONE ENCOUNTER
----- Message from Gaviota Zelaya RN sent at 5/5/2023  9:19 AM CDT -----  Regarding: therapy plan  Mildred, happy Friday   Changed therapy to every 4 weeks   Can you fax to [facility] I am calling them now.     Thanks

## 2023-05-05 NOTE — TELEPHONE ENCOUNTER
Mother left a message late yesterday asking clinic to call patient  Patient has started a new job so hard to arrange clinic appt.   Continues on budenoside (entocort 9 mg)  Reports 10 to 15 stools with blood   Has had 4 entyvio infusions   Next one is May 30th     Will route to Dr Moeller for further direction.

## 2023-05-05 NOTE — TELEPHONE ENCOUNTER
Patient calls to say she has new insurance but does not have the card yet  Instructed to let her infusion team know that she has a change in insurance as she will need a new prior and once she has the card to let us know and send a front and back of card via my chart.

## 2023-05-05 NOTE — TELEPHONE ENCOUNTER
Discussed patient with Dr Moeller   Recommendations stop entocort   Start prednisone taper  Work for entyvio every 4 weeks  Patient has clinic appointment with Dr Moeller on June 5   Dr Moeller will discuss if patient needs to keep the June 21 appointment with [place] provider at her June 5 appointment     Updated patient and sent the prescription and will work on the change in interval of infusion to every 4 weeks.

## 2023-05-09 ENCOUNTER — PATIENT OUTREACH (OUTPATIENT)
Dept: GASTROENTEROLOGY | Facility: CLINIC | Age: 28
End: 2023-05-09
Payer: COMMERCIAL

## 2023-05-09 NOTE — TELEPHONE ENCOUNTER
Left a message for Stephani RN navigator for infusions to discuss further information needed for Entyvio every 4 weeks

## 2023-05-09 NOTE — TELEPHONE ENCOUNTER
Contacted patient for an update    Left a voice mail message and my chart to see how patient is doing since starting on prednisone

## 2023-05-09 NOTE — TELEPHONE ENCOUNTER
Received a call from [email or web address] infusion prior team Marsha asking for a letter of necessity for change  vedo every 4 weeks   Routed to Dr Moeller   Letter should be faxed to [phone] attention Marsha

## 2023-05-11 ENCOUNTER — TELEPHONE (OUTPATIENT)
Dept: GASTROENTEROLOGY | Facility: CLINIC | Age: 28
End: 2023-05-11
Payer: COMMERCIAL

## 2023-05-11 NOTE — TELEPHONE ENCOUNTER
Labs order by Dr. Moeller on 5/11/23 Printed and faxed to [facility] at [phone].  - calprotectin  - c-diff  -enteric  - o and P     Confirmed in care everywhere they had got the order. Did not see O and P ordered. Re-faxed to [facility] at [phone] and will follow up to make sure they got.     Called to get the other stool labs sent over. She stated the O and P is listed on the enteric panel and the calprotectin is still in process.    Labs all returned and sent to HIM to scan into patient chart.

## 2023-05-11 NOTE — TELEPHONE ENCOUNTER
[facility] will submit prior for every 4 weeks with the elevated calprotectin of over 5000   On going 8 plus bloody stools even thou on 40mg of Prednisone

## 2023-05-16 ENCOUNTER — TELEPHONE (OUTPATIENT)
Dept: GASTROENTEROLOGY | Facility: CLINIC | Age: 28
End: 2023-05-16
Payer: COMMERCIAL

## 2023-05-16 ENCOUNTER — PATIENT OUTREACH (OUTPATIENT)
Dept: GASTROENTEROLOGY | Facility: CLINIC | Age: 28
End: 2023-05-16
Payer: COMMERCIAL

## 2023-05-16 DIAGNOSIS — K51.00 ULCERATIVE PANCOLITIS WITHOUT COMPLICATION (H): Primary | ICD-10-CM

## 2023-05-16 RX ORDER — PREDNISOLONE 5 MG/1
TABLET ORAL
Qty: 157 TABLET | Refills: 0 | Status: SHIPPED | OUTPATIENT
Start: 2023-05-16 | End: 2023-06-06

## 2023-05-16 NOTE — TELEPHONE ENCOUNTER
Patient called with an update from Stephani at [facility] infusion that Entyvio every 4 weeks has been denied   Team was going to reach out to Entyvio connect to see if egible for free drug.   Patient up last night 5 times for bowel movement  Total of 8 bowel movements today   Patient on way to clinic to complete stool studies       Contacted endoscopy scheduling to see if any openings for colonoscopy in the near future     Patient also picking up container to have well water tested.

## 2023-05-16 NOTE — TELEPHONE ENCOUNTER
Discussed on telephone her current symptoms and plan.    She is currently on vedolizumab every 8 weeks along with prednisone 40 mg a day.  Ever since her FMT to prevent C. difficile recurrence, she has been experiencing a severe ulcerative colitis flare.  I note that she was improved on endoscopy, however she did have a patch of endoscopic Rocha score 2 prior to the FMT.  Therefore she was technically not in complete endoscopic remission prior to FMT.  It is possible this is related to her current flare.    I also note that her previous C. difficile infection was in the setting of discordant testing.  While she was vancomycin responsive there is a possibility that her UC is vancomycin responsive.    Overall I suspect this is a moderate to severe UC flare not responding to vedolizumab at standard maintenance dosing.      Given the fact that she is not responding to current prednisone, I discussed the possibility of upadacitinib.  Also discussed the possibility of moving to vedolizumab every 4 weeks, her insurance did deny this and we would have to appeal this.    -- Change to prednisolone 60 mg in divided dosing  -- Appeal letter written for vedolizumab every 4 weeks  -- If not getting better in 1 week, plan to restart vancomycin  -- If not better in 2 weeks, transition to upadacitinib  -- If response to prednisolone or vancomycin, will transition to vedolizumab every 4 weeks pending insurance approval  -- Colonoscopy moved up to end of May    -Dr. Moeller

## 2023-05-16 NOTE — TELEPHONE ENCOUNTER
Discussed denial of every 4 weeks for entyvio  Notified Dr Moeller and Dr Moeller will reach out to patient.

## 2023-05-16 NOTE — TELEPHONE ENCOUNTER
Plan Dr Moeller will write a letter to appeal denial of every 4 weeks  Moved colonoscopy to Dr Vogel on May 31 at 945  Patient dropped off stool specimens at [place]   Await results of stool studies   Reminded patient to complete well water study

## 2023-05-16 NOTE — TELEPHONE ENCOUNTER
Procedure Scheduled: Upper Endoscopy [EGD]    Procedure Date: 05/31/2023    Site:[facility]; [address] [place] [address], [address] Floor, [place] [address]    Endoscopist: ISAIAS PETER/MARCIA's RN     Ordering Provider: BENI     Scheduled by (per the direction of): DEEPA PETER/MARCIA's RN CALLED FROM GI CLINIC     [place]           We do not accept [employer] insurance.

## 2023-05-17 ENCOUNTER — TELEPHONE (OUTPATIENT)
Dept: GASTROENTEROLOGY | Facility: CLINIC | Age: 28
End: 2023-05-17

## 2023-05-17 ENCOUNTER — PATIENT OUTREACH (OUTPATIENT)
Dept: GASTROENTEROLOGY | Facility: CLINIC | Age: 28
End: 2023-05-17

## 2023-05-17 NOTE — PROGRESS NOTES
Dr. Moeller,  Spoke with Poonam, she states she received her lab results and is positive for E. Coli. She is asking if this changes your recommendation on Entyvio and if she will continue to remain on Entyvio instead of switching.    She is also asking if you recommend a treatment for the e coli since she has been having this problem since the middle of April.    She will get her well water tested tomorrow.    Thank you.  Nichole

## 2023-05-17 NOTE — TELEPHONE ENCOUNTER
Pre assessment questions completed for upcoming Colonoscopy  procedure scheduled on 5.31.23    COVID policy reviewed.     Reviewed procedural arrival time 0945 and facility location [facility]; [address] [place] [address], [address] Floor, [place] [address]    Designated  policy reviewed. Instructed to have someone stay 6 hours post procedure.     NSAIDs? No    Anticoagulation/blood thinners? No    Electronic implanted devices? No    Diabetic? No    Reviewed miralax w/o magnesium citrate procedure prep instructions.     Patient verbalized understanding and had no questions or concerns at this time.    Guera Epps RN  Endoscopy Procedure Pre Assessment RN

## 2023-05-18 NOTE — PROGRESS NOTES
Spoke with Poonam and relayed MD message/recommendations above.  Patient acknowledged understanding and agree with plan. She will submit her well water for testing this AM. She will start the prednisone 20 mg tomorrow and taper 5mg every 5 days and try to start peptobismal today. Discussed with her that a request for escalation of her Entyvio to every 4 weeks has been started. Also discuss that she drink from bottle water until we know the source of the e. Coli and if it is from her well water.  Answer all questions offered contact information for pt to call with any questions or concerns.

## 2023-05-19 ENCOUNTER — PATIENT OUTREACH (OUTPATIENT)
Dept: GASTROENTEROLOGY | Facility: CLINIC | Age: 28
End: 2023-05-19
Payer: COMMERCIAL

## 2023-05-19 DIAGNOSIS — K51.00 ULCERATIVE PANCOLITIS WITHOUT COMPLICATION (H): Primary | ICD-10-CM

## 2023-05-19 NOTE — PROGRESS NOTES
Poonam called, she will start the peto-bismal today and see how it works and we can discuss progress on Monday. She states the testing for well water might not result until Monday or Tuesday and will keep us updated. She did not  budesonide. But did start the prednisone. Advice poonam to not  the budesonide and just taper the prednisone.

## 2023-05-23 ENCOUNTER — TELEPHONE (OUTPATIENT)
Dept: GASTROENTEROLOGY | Facility: CLINIC | Age: 28
End: 2023-05-23
Payer: COMMERCIAL

## 2023-05-23 ENCOUNTER — TELEPHONE (OUTPATIENT)
Dept: GASTROENTEROLOGY | Facility: CLINIC | Age: 28
End: 2023-05-23

## 2023-05-23 ENCOUNTER — MYC MEDICAL ADVICE (OUTPATIENT)
Dept: GASTROENTEROLOGY | Facility: CLINIC | Age: 28
End: 2023-05-23
Payer: COMMERCIAL

## 2023-05-23 NOTE — PROGRESS NOTES
Order MTM. Order labs (CBC, CRP,ESR,COMP and Lipid). Made lab appt for 9:15am on 5/31 prior to colonoscopy appointment.

## 2023-05-23 NOTE — TELEPHONE ENCOUNTER
Spoke with Poonam and relayed MD message/recommendations.  Patient acknowledged understanding and agree with plan. Answer all questions offered contact information for pt to call with any questions or concerns.    Poonam will start Prednisone, stop pepto-bismal and get labs prior to her colonoscopy.

## 2023-05-23 NOTE — TELEPHONE ENCOUNTER
PA Initiation    Medication: RINVOQ 45 MG PO TB24  Insurance Company: [employer] - Phone [phone] Fax [phone]  Pharmacy Filling the Rx:    Filling Pharmacy Phone:    Filling Pharmacy Fax:    Start Date: 5/23/2023    [id number]

## 2023-05-23 NOTE — PROGRESS NOTES
Spoke with Elle - Nurse Navigator at [facility], she states she needs documentation for the q4week escalation appeal

## 2023-05-23 NOTE — TELEPHONE ENCOUNTER
PA Initiation    Medication: RINVOQ 45 MG PO TB24  Insurance Company: [facility] - Phone [phone] Fax [phone]  Pharmacy Filling the Rx:    Filling Pharmacy Phone:    Filling Pharmacy Fax:    Start Date: 5/23/2023 5/23 requested access to [facility] to submit pa

## 2023-05-23 NOTE — PROGRESS NOTES
"Cassy Gonzalez called, Poonam is taking her certification and states that Poonam is just \"miserable this past weekend.\" Pt has been taking pepto-Bismal 4/day and does not see any decrease in symptoms. The bottle states to take for 2 days.   she had about 20 bm/day and most blood. Not eating, cramping pretty much all day and worst after meals. The cramping is causing nausea. Feels dehydrated and trying to drink more water. Poonam had stated she was better when she was taking the higher dose prednisone.    She has her infusion on June 1st after the colonoscopy on 5/31.    RN advice pt go back to 40 mg for right now to help while we obtain an appeal to her Q4week dosing of Entyvio and to stop the Pepto-Bismal.    She did not get her well test results back.    Thank you.  Nichole"

## 2023-05-23 NOTE — PROGRESS NOTES
May 23, 2023    Called Poonam Left message and contact info to return call regarding: symptoms update

## 2023-05-23 NOTE — TELEPHONE ENCOUNTER
[facility] Call Center    Phone Message    May a detailed message be left on voicemail: yes     Reason for Call: Other: Elle from [facility] is still waiting for the letter dated 5/16 from Dr Adamson. Please fax to [phone]     Action Taken: Message routed to:  [facility]    Travel Screening: Not Applicable

## 2023-05-23 NOTE — PROGRESS NOTES
Spoke with Dr. Moeller, recommendation to transition to Rinvoq.    Order Rinvoq.    [facility],  Please see new Rx for Rinvoq. Please obtain PA.    Thank you.  Nichole

## 2023-05-23 NOTE — TELEPHONE ENCOUNTER
[facility] Call Center    Phone Message    May a detailed message be left on voicemail: yes     Reason for Call: Other:      Elle from [facility] in [place] is requesting a call back from [place] to discuss questions they have regarding the pt.    [phone]    Action Taken: Message routed to:  Other: [facility]    Travel Screening: Not Applicable

## 2023-05-24 ENCOUNTER — TELEPHONE (OUTPATIENT)
Dept: GASTROENTEROLOGY | Facility: CLINIC | Age: 28
End: 2023-05-24
Payer: COMMERCIAL

## 2023-05-24 ENCOUNTER — PATIENT OUTREACH (OUTPATIENT)
Dept: GASTROENTEROLOGY | Facility: CLINIC | Age: 28
End: 2023-05-24
Payer: COMMERCIAL

## 2023-05-24 NOTE — TELEPHONE ENCOUNTER
[facility] Call Center    Phone Message    May a detailed message be left on voicemail: yes     Reason for Call: Other: Mable with [facility] is requesting a call back please to discuss forms for Pt's infusions. Please advise. Thank you!     Action Taken: Message routed to:  [facility]    Travel Screening: Not Applicable

## 2023-05-24 NOTE — TELEPHONE ENCOUNTER
Spoke with Zadya - Nurse Navigator at [facility], advice Zayda that we are transitioning pt to a different med and will forgo the appeal.

## 2023-05-26 NOTE — PROGRESS NOTES
Disease State Management Encounter:                          Poonam Berg is a 27 year old female called for for an initial visit. She was referred to me from Dr. Moeller. Her mother, Cassy, joins us today on the phone.     Reason for visit: Discuss Rinvoq.    On Entyvio every 8 weeks,  prednisone 40 mg daily, and dicyclomine 10 mg PRN.     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 Ever since her FMT to prevent C. difficile recurrence, she has been experiencing a severe ulcerative colitis flare. Per Dr. Moeller, although she was improved on endoscopy, however she did have a patch of endoscopic Rocha score 2 prior to the FMT.  Therefore she was technically not in complete endoscopic remission prior to FMT.     Today she reports that she is feeling pretty rough. She is having about 15 BMs day which are bloody and loose. She is also having abdominal cramps. She is using dicyclomine a couple of times a day.     She was in [place] ED on 5/27/23. Received IVF and morphine. She was discharged hoe with ondansetron. Reports that she is still not able to eat much, but she is trying to hydrate.     On 4/25/23 she was started on budesonide but no improvement therefore a predisone taper was started. On 5/5/23 and recommended to change Entyvio to every 4 weeks. (PA was denied). It was recommended to switch to Rinvoq.     Of note, [place] and uses well water to cook. stool studies along with ova and parasite stool study were ordered. Well water study was negative. C diff was negative. positive for E. Coli. She is having a colonoscopy tomorrow.     Today's Vitals: There were no vitals taken for this visit.    Assessment/Plan:    Patient continues to be symptomatic. I encouraged her to try to remain hydrated. Today we discussed Rinvoq in detail and I answered all of their questions that were sent on [facility]. Specifically  we reviewed the mechanism of action/drug class, short-half life, time to efficacy, and side effects. Reviewed that GI perforation is very rare and while on prednisone she should take with food and avoid NSAIDs to lessen that risk.  We discussed that Rinvoq is very effective and we discussed the difference between Zeposia. We discussed the ghost shell. Regarding pregnancy, we reviewed that more importantly she is to be in remission before stating to try for pregnancy. Low-dose naltrexone would not be recommended at this time, but we could consider it in the future depending on the research available. We also reviewed immunosuppression, drug interactions, monitoring (including a lipid panel), avoiding grapefruit, and VTE risk. After this discussion, they were very interested in trying the Rinvoq. It appears that the PA was approved -- I will clarify with Dr. Moeller about next steps.     Follow-up: Pending colonoscopy tomorrow    I spent 33 minutes with this patient today. All changes were made via verbal approval with Dr. Theodore Moeller. A copy of the visit note was provided to the patient's provider(s).    A summary of these recommendations was declined by the patient.    Grecia Mcgregor, Pharm.D., BCACP   Medication Therapy Management Pharmacist   [facility]     Post Discharge Medication Reconciliation Status: discharge medications reconciled and changed, per note/orders.     Medication Therapy Recommendations  No medication therapy recommendations to display

## 2023-05-30 ENCOUNTER — PATIENT OUTREACH (OUTPATIENT)
Dept: GASTROENTEROLOGY | Facility: CLINIC | Age: 28
End: 2023-05-30
Payer: COMMERCIAL

## 2023-05-30 ENCOUNTER — VIRTUAL VISIT (OUTPATIENT)
Dept: PHARMACY | Facility: CLINIC | Age: 28
End: 2023-05-30

## 2023-05-30 DIAGNOSIS — K51.00 ULCERATIVE PANCOLITIS WITHOUT COMPLICATION (H): Primary | ICD-10-CM

## 2023-05-30 DIAGNOSIS — K51.00 ULCERATIVE PANCOLITIS WITHOUT COMPLICATION (H): ICD-10-CM

## 2023-05-30 PROCEDURE — 99207 PR NO CHARGE LOS: CPT | Performed by: PHARMACIST

## 2023-05-30 RX ORDER — DICYCLOMINE HYDROCHLORIDE 10 MG/1
10 CAPSULE ORAL 2 TIMES DAILY PRN
COMMUNITY
End: 2023-12-08

## 2023-05-30 RX ORDER — ONDANSETRON 4 MG/1
TABLET, ORALLY DISINTEGRATING ORAL
COMMUNITY
Start: 2023-05-28 | End: 2023-05-30

## 2023-05-30 NOTE — PROGRESS NOTES
aCssy - mother of pt called. Pt was not doing well    May 30, 2023    Called Poonam Left message and contact info to return call regarding: message

## 2023-05-30 NOTE — TELEPHONE ENCOUNTER
----- Message from Theodore Moeller MD sent at 5/30/2023  4:23 PM CDT -----  Regarding: RE: Rinvoq  Yes -   Lets start it right after colonoscopy    THANK YOU!!!     ----- Message -----  From: Grecia Mcgregor, PharmD  Sent: 5/30/2023  12:35 PM CDT  To: Nichole Adair RN; #  Subject: Rinvoq                                           [facility]! I chatted with Poonam and her mom today about Rinvoq and answered the many questions they had -- they are comfortable with proceeding. Of note, she was in the ED on Saturday and received IVF. Today she is still having about 15 BMs daily. She is having a colonoscopy tomorrow. What are the next steps with her? The PA for the Rinvoq was approved (we just need to set up delivery), so let me know if you want her to start it right after the colonoscopy and I can call her pharmacy? Thanks!     Grecia

## 2023-05-31 ENCOUNTER — LAB (OUTPATIENT)
Dept: LAB | Facility: CLINIC | Age: 28
End: 2023-05-31
Payer: COMMERCIAL

## 2023-05-31 ENCOUNTER — HOSPITAL ENCOUNTER (OUTPATIENT)
Facility: AMBULATORY SURGERY CENTER | Age: 28
Discharge: HOME | End: 2023-05-31
Attending: INTERNAL MEDICINE | Admitting: INTERNAL MEDICINE
Payer: COMMERCIAL

## 2023-05-31 ENCOUNTER — MYC MEDICAL ADVICE (OUTPATIENT)
Dept: GASTROENTEROLOGY | Facility: CLINIC | Age: 28
End: 2023-05-31

## 2023-05-31 VITALS
TEMPERATURE: 98.9 F | SYSTOLIC BLOOD PRESSURE: 112 MMHG | WEIGHT: 180 LBS | RESPIRATION RATE: 16 BRPM | DIASTOLIC BLOOD PRESSURE: 71 MMHG | HEIGHT: 70 IN | OXYGEN SATURATION: 98 % | BODY MASS INDEX: 25.77 KG/M2 | HEART RATE: 97 BPM

## 2023-05-31 DIAGNOSIS — K51.00 ULCERATIVE PANCOLITIS WITHOUT COMPLICATION (H): ICD-10-CM

## 2023-05-31 LAB
ALBUMIN SERPL BCG-MCNC: 3.8 G/DL (ref 3.5–5.2)
ALP SERPL-CCNC: 47 U/L (ref 35–104)
ALT SERPL W P-5'-P-CCNC: 10 U/L (ref 10–35)
ANION GAP SERPL CALCULATED.3IONS-SCNC: 10 MMOL/L (ref 7–15)
AST SERPL W P-5'-P-CCNC: 16 U/L (ref 10–35)
BASOPHILS # BLD AUTO: 0.1 10E3/UL (ref 0–0.2)
BASOPHILS NFR BLD AUTO: 1 %
BILIRUB SERPL-MCNC: 0.4 MG/DL
BUN SERPL-MCNC: 7.6 MG/DL (ref 6–20)
CALCIUM SERPL-MCNC: 9 MG/DL (ref 8.6–10)
CHLORIDE SERPL-SCNC: 99 MMOL/L (ref 98–107)
CHOLEST SERPL-MCNC: 165 MG/DL
COLONOSCOPY: NORMAL
CREAT SERPL-MCNC: 0.91 MG/DL (ref 0.51–0.95)
CRP SERPL-MCNC: 91.8 MG/L
DEPRECATED HCO3 PLAS-SCNC: 27 MMOL/L (ref 22–29)
EOSINOPHIL # BLD AUTO: 0.2 10E3/UL (ref 0–0.7)
EOSINOPHIL NFR BLD AUTO: 3 %
ERYTHROCYTE [DISTWIDTH] IN BLOOD BY AUTOMATED COUNT: 18.6 % (ref 10–15)
ERYTHROCYTE [SEDIMENTATION RATE] IN BLOOD BY WESTERGREN METHOD: 35 MM/HR (ref 0–20)
GFR SERPL CREATININE-BSD FRML MDRD: 89 ML/MIN/1.73M2
GLUCOSE SERPL-MCNC: 112 MG/DL (ref 70–99)
HCG UR QL: NEGATIVE
HCT VFR BLD AUTO: 34.7 % (ref 35–47)
HDLC SERPL-MCNC: 32 MG/DL
HGB BLD-MCNC: 11.4 G/DL (ref 11.7–15.7)
IMM GRANULOCYTES # BLD: 0.1 10E3/UL
IMM GRANULOCYTES NFR BLD: 1 %
INTERNAL QC OK POCT: NORMAL
LDLC SERPL CALC-MCNC: 95 MG/DL
LYMPHOCYTES # BLD AUTO: 2.7 10E3/UL (ref 0.8–5.3)
LYMPHOCYTES NFR BLD AUTO: 34 %
MCH RBC QN AUTO: 26.2 PG (ref 26.5–33)
MCHC RBC AUTO-ENTMCNC: 32.9 G/DL (ref 31.5–36.5)
MCV RBC AUTO: 80 FL (ref 78–100)
MONOCYTES # BLD AUTO: 1 10E3/UL (ref 0–1.3)
MONOCYTES NFR BLD AUTO: 12 %
NEUTROPHILS # BLD AUTO: 3.9 10E3/UL (ref 1.6–8.3)
NEUTROPHILS NFR BLD AUTO: 49 %
NONHDLC SERPL-MCNC: 133 MG/DL
NRBC # BLD AUTO: 0 10E3/UL
NRBC BLD AUTO-RTO: 1 /100
PLATELET # BLD AUTO: 573 10E3/UL (ref 150–450)
POCT KIT EXPIRATION DATE: NORMAL
POCT KIT LOT NUMBER: NORMAL
POTASSIUM SERPL-SCNC: 3.6 MMOL/L (ref 3.4–5.3)
PROT SERPL-MCNC: 6.6 G/DL (ref 6.4–8.3)
RBC # BLD AUTO: 4.35 10E6/UL (ref 3.8–5.2)
SODIUM SERPL-SCNC: 136 MMOL/L (ref 136–145)
TRIGL SERPL-MCNC: 192 MG/DL
WBC # BLD AUTO: 7.9 10E3/UL (ref 4–11)

## 2023-05-31 PROCEDURE — 45380 COLONOSCOPY AND BIOPSY: CPT

## 2023-05-31 PROCEDURE — 80061 LIPID PANEL: CPT | Performed by: PATHOLOGY

## 2023-05-31 PROCEDURE — 88305 TISSUE EXAM BY PATHOLOGIST: CPT | Mod: TC | Performed by: INTERNAL MEDICINE

## 2023-05-31 PROCEDURE — 88342 IMHCHEM/IMCYTCHM 1ST ANTB: CPT | Mod: 26 | Performed by: PATHOLOGY

## 2023-05-31 PROCEDURE — 85652 RBC SED RATE AUTOMATED: CPT | Performed by: PATHOLOGY

## 2023-05-31 PROCEDURE — 86140 C-REACTIVE PROTEIN: CPT | Performed by: PATHOLOGY

## 2023-05-31 PROCEDURE — 85025 COMPLETE CBC W/AUTO DIFF WBC: CPT | Performed by: PATHOLOGY

## 2023-05-31 PROCEDURE — 81025 URINE PREGNANCY TEST: CPT | Performed by: PATHOLOGY

## 2023-05-31 PROCEDURE — 88305 TISSUE EXAM BY PATHOLOGIST: CPT | Mod: 26 | Performed by: PATHOLOGY

## 2023-05-31 PROCEDURE — 36415 COLL VENOUS BLD VENIPUNCTURE: CPT | Performed by: PATHOLOGY

## 2023-05-31 PROCEDURE — 80053 COMPREHEN METABOLIC PANEL: CPT | Performed by: PATHOLOGY

## 2023-05-31 RX ORDER — FENTANYL CITRATE 50 UG/ML
INJECTION, SOLUTION INTRAMUSCULAR; INTRAVENOUS DAILY PRN
Status: DISCONTINUED | OUTPATIENT
Start: 2023-05-31 | End: 2023-05-31 | Stop reason: HOSPADM

## 2023-05-31 RX ORDER — ONDANSETRON 2 MG/ML
4 INJECTION INTRAMUSCULAR; INTRAVENOUS ONCE
Status: COMPLETED | OUTPATIENT
Start: 2023-05-31 | End: 2023-05-31

## 2023-05-31 RX ADMIN — ONDANSETRON 4 MG: 2 INJECTION INTRAMUSCULAR; INTRAVENOUS at 11:13

## 2023-05-31 NOTE — PROGRESS NOTES
Poonam called, she is asking when she can start Rinvoq.    Per Nain, pt has 2 insurance. The first one is approved with $2500 copay. Which she can get to $5 if she applies for the co-pay card. Still waiting for approval from 2nd insurance.    Spoke with Poonam and discuss her option with 1st insurance or to wait for 2nd insurance to get approval. Poonam agree to 1st option.    Reorder Rinvoq to [facility], per pt this pharmacy can handle the prescription. Gave Poonam contact # for Rinvoq to apply for co-pay card.

## 2023-06-01 ENCOUNTER — MYC MEDICAL ADVICE (OUTPATIENT)
Dept: PHARMACY | Facility: CLINIC | Age: 28
End: 2023-06-01
Payer: COMMERCIAL

## 2023-06-01 ENCOUNTER — PATIENT OUTREACH (OUTPATIENT)
Dept: GASTROENTEROLOGY | Facility: CLINIC | Age: 28
End: 2023-06-01
Payer: COMMERCIAL

## 2023-06-01 LAB
PATH REPORT.COMMENTS IMP SPEC: NORMAL
PATH REPORT.FINAL DX SPEC: NORMAL
PATH REPORT.GROSS SPEC: NORMAL
PATH REPORT.MICROSCOPIC SPEC OTHER STN: NORMAL
PATH REPORT.RELEVANT HX SPEC: NORMAL
PHOTO IMAGE: NORMAL

## 2023-06-01 NOTE — TELEPHONE ENCOUNTER
My chart and voice mail message to patient offering colon and rectal appointment with Dr Damon on June 2 at 12 noon.   Await a call back

## 2023-06-01 NOTE — TELEPHONE ENCOUNTER
Discussed plan with Dr Sajan Alston  Start Rinvoq as soon as available (pt thinks she will have in the next day or two )    MTM appointment in one week      Continue on prednisone 40mg for at least two weeks      Discussed plan with patient  Patient continues to have 12 loose stools at times just blood  Continue Bentyl    Message to Grecia Mcgregor to follow up with pt next week

## 2023-06-01 NOTE — PROGRESS NOTES
"Colon and Rectal Surgery Virtual Note    RE: Poonam Berg.  : 1995.  VIVEK: 2023.    Poonam Berg is a 27 year old female who is being evaluated via a billable video visit.      The patient has been notified of following:     \"This video visit will be conducted via a call between you and your physician/provider. We have found that certain health care needs can be provided without the need for an in-person physical exam.  This service lets us provide the care you need with a video conversation.  If a prescription is necessary we can send it directly to your pharmacy.  If lab work is needed we can place an order for that and you can then stop by our lab to have the test done at a later time.    Video visits are billed at different rates depending on your insurance coverage.  Please reach out to your insurance provider with any questions.    If during the course of the call the physician/provider feels a video visit is not appropriate, you will not be charged for this service.\"    Patient has given verbal consent for Video visit? Yes    Reason for visit: Medically refractory chronic ulcerative pancolitis.     HPI: 27-year-old woman with history of medically refractory ulcerative pancolitis and recurrent C. difficile infections status post FMT in 2023 with subsequent persistent severe UC flare refractory to medical therapy. Currently having 6-8 liquid to mushy bowel movements with occasional blood with various degrees of urgency but denies fecal incontinence.  No previous anorectal operations.  Tolerating diet well.      IBD HISTORY  Age at diagnosis: 20.  Extent of disease: pancolitis.  Prior UC surgeries: none.  IBD FH: aunt and 2nd cousing with mild form of UC. No Crohn's disease.  Prior IBD Medications:  - Canasa  - Infliximab 5mg/kg (400mg) q6  - Azathioprine 150mg (sepetmebr  - Summer 2022) - de-escalated due to   - Adalimumab weekly (started 2018, dose escalated 2019).  - Vedolizumab " (started fall 2022).  - Prednisone 40->60mg over the past 2 months given severe flare.  - Upadacitinib (to start 6/5/23).      Colonoscopy 5.31.23  Findings:        The terminal ileum appeared normal.        Inflammation was found in a continuous and circumferential pattern from        the anus to the hepatic flexure. This was graded as Rocha Score 3        (severe, massive ulcerations). Biopsies were taken with a cold forceps        for histology. Many pseudopolyps.        Inflammation was found in a continuous and circumferential pattern from        the hepatic flexure to the cecum. This was graded as Rocha Score 2        (moderate, scattered ulcerations). Biopsies were taken with a cold        forceps for histology. Pseudopolyps present    Final Diagnosis   A. RIGHT COLON, RANDOM BIOPSY:  -Moderate chronic active colitis with crypt abscess formation (Veronica grade 3)  -CMV immunoperoxidase stain is negative, appropriate control obtained  -No evidence of dysplasia    B. LEFT COLON, RANDOM BIOPSY:  -Moderate chronic active colitis with crypt abscess formation and focal erosion (Veronica grade 3-4)  -CMV immunoperoxidase stain is negative, appropriate control obtained  -No evidence of dysplasia         Flexible Sigmoidoscopy 2.27.23                                                                  Findings:        Examined colon was mostly normal. At 25cm from the anus, there was an        area of dense pseudopolyps with some associated inflammation of the        pseudopolyps and adjacent colon. This focal area was consistent with        eMayo 2 inflammation. Otherwise, inflammation was not found based on the        endoscopic appearance of the mucosa in the colon. This was graded as        Rcoha Score 0 (normal or inactive disease). Biopsies were taken with a        cold forceps for histology. Verification of patient identification for        the specimen was done. Estimated blood loss was minimal.        Diffuse  pseudopolyps were found in the sigmoid colon, in the descending        colon and in the transverse colon.                                                                                     Impression:               Overall, this represents a good response to                             vedolizumab. The calprotectin elevated is likely                             reflective of pseudopolyps with some associate                             inflammation on the polyps. Therefore calprotectin                             is not the best measurement of her disease                             activity. Given her response to vancomycin, it is                             possible she had a superimposed C difficile                             infection (PCR+ / Toxin - ) while in vedolizumab                             induction.                             - Inactive (Rocha Score 0) quiescent ulcerative                             colitis. Biopsied: Left colon (mix of normal colon                             and inflamed mucosa at 25cm) and rectum.                             - Pseudopolyps in the sigmoid colon, in the                             descending colon and in the transverse colon.     A: Large intestine, descending, biopsies:  -Mildly active, chronic colitis without evidence of granulomas or atypia     B: Large intestine, rectum, biopsies:  -Benign colonic mucosa with minimal gland architecture distortion, no evidence of acute inflammation and no other abnormalities    Medical history:  Past Medical History:   Diagnosis Date     Ulcerative colitis (H)        Surgical history:  Past Surgical History:   Procedure Laterality Date     COLONOSCOPY N/A 5/31/2023    Procedure: COLONOSCOPY, WITH BIOPSY;  Surgeon: Gabino Krueger MD;  Location: [facility] OR       Family history:  No family history on file.    Medications:  Current Outpatient Medications   Medication Sig Dispense Refill     dicyclomine (BENTYL) 10 MG capsule Take  10 mg by mouth 2 times daily as needed       ondansetron (ZOFRAN) 4 MG tablet Take 1 tablet (4 mg) by mouth every 6 hours as needed for nausea or vomiting 20 tablet 1     predniSONE (DELTASONE) 5 MG tablet Take 8 tablets (40 mg) by mouth daily for 14 days, THEN 6 tablets (30 mg) daily for 7 days, THEN 4 tablets (20 mg) daily for 7 days, THEN 3 tablets (15 mg) daily for 7 days, THEN 2 tablets (10 mg) daily for 7 days, THEN 1 tablet (5 mg) daily for 7 days, THEN 1 tablet (5 mg) every other day for 7 days. 227 tablet 0     EPINEPHrine (ANY BX GENERIC EQUIV) 0.3 MG/0.3ML injection 2-pack Inject 0.3 mLs (0.3 mg) into the muscle as needed for anaphylaxis May repeat one time in 5-15 minutes if response to initial dose is inadequate. 2 each 0     prednisoLONE 5 MG tablet Take 6 tablets (30 mg) by mouth 2 times daily for 14 days, THEN 4 tablets (20 mg) daily for 7 days, THEN 3 tablets (15 mg) daily for 7 days, THEN 2 tablets (10 mg) daily for 7 days, THEN 1 tablet (5 mg) daily for 7 days, THEN 1 tablet (5 mg) every other day for 7 days. (Patient not taking: Reported on 6/2/2023) 157 tablet 0     Upadacitinib ER 45 MG TB24 Take 45 mg by mouth daily 56 tablet 0     vedolizumab (ENTYVIO) 60 MG/ML injection          Allergies:  Allergies   Allergen Reactions     Infliximab Nausea and Other (See Comments)     FACE FLUSHING WITH ITCHY/SCRATCHY THROAT.        Social history:   Social History     Tobacco Use     Smoking status: Never     Smokeless tobacco: Never   Vaping Use     Vaping status: Never Used   Substance Use Topics     Alcohol use: Yes     Comment: Occasional     Marital status: .    Investigations:  None.    Procedures:  None.    ASSESSMENT  27-year-old female with medically refractory ulcerative pancolitis with severe flare since FMT in April 2023.  History of recurrent C. difficile infection which may be a surrogate for disease severity.  We discussed the role and impact of operative treatment, mainly 2  approaches with total abdominal proctocolectomy with end ileostomy versus total abdominal proctocolectomy with ileal pouch-anal anastomosis and temporary diverting loop ileostomy. This would be performed in 2 or 3 operations, respectively. We discussed the long-term side effects and possible sequelae of these including but not limited to anastomotic leak, pouch failure requiring permanent fecal diversion, fecal seepage and incontinence, pouchitis, infertility, potential conversion to Crohn's disease, refractory diarrhea, recurrent bowel obstructions, dehydration, kidney failure, and kidney stones. Risks, benefits, and alternatives of operative treatment were thoroughly discussed with the patient, he/she understands these well and agrees to proceed.    PLAN  1.  Poonam would like to see how she responds to Rinvoq and will get back to us on her response after considering surgical options.    Video-Visit Details    Type of service:  Video Visit    Originating Location (pt. Location): Home    Distant Location (provider location):  [facility]     Mode of Communication:  Video Conference via [employer].    60 minutes spent on the date of the encounter doing chart review, history, review of imaging, documentation ,and further activities as noted above, which includes my time spent on video with the patient/family.     Gumaro Damon M.D., M.Sc.     Division of Colon and Rectal Surgery  [facility]    Referring Provider:  Theodore Moeller MD    Primary Care Provider:  Savanah Richard

## 2023-06-02 ENCOUNTER — PATIENT OUTREACH (OUTPATIENT)
Dept: GASTROENTEROLOGY | Facility: CLINIC | Age: 28
End: 2023-06-02

## 2023-06-02 ENCOUNTER — TELEPHONE (OUTPATIENT)
Dept: GASTROENTEROLOGY | Facility: CLINIC | Age: 28
End: 2023-06-02

## 2023-06-02 ENCOUNTER — VIRTUAL VISIT (OUTPATIENT)
Dept: SURGERY | Facility: CLINIC | Age: 28
End: 2023-06-02
Payer: COMMERCIAL

## 2023-06-02 DIAGNOSIS — K51.00 ULCERATIVE PANCOLITIS WITHOUT COMPLICATION (H): ICD-10-CM

## 2023-06-02 DIAGNOSIS — K51.00 ULCERATIVE PANCOLITIS WITHOUT COMPLICATION (H): Primary | ICD-10-CM

## 2023-06-02 DIAGNOSIS — K51.00 ULCERATIVE PANCOLITIS (H): Primary | ICD-10-CM

## 2023-06-02 PROCEDURE — 99205 OFFICE O/P NEW HI 60 MIN: CPT | Mod: VID | Performed by: COLON & RECTAL SURGERY

## 2023-06-02 RX ORDER — PREDNISONE 5 MG/1
40 TABLET ORAL DAILY
Qty: 112 TABLET | Refills: 0 | Status: SHIPPED | OUTPATIENT
Start: 2023-06-02 | End: 2023-06-16

## 2023-06-02 NOTE — TELEPHONE ENCOUNTER
[facility] Call Center    Phone Message    May a detailed message be left on voicemail: yes     Reason for Call: Medication Question or concern regarding medication   Prescription Clarification  Name of Medication: prednisoLONE 5 MG tablet  Prescribing Provider: Theodore Moeller MD   Pharmacy: [id number] - [place], [place] - [address] [place] [address]   What on the order needs clarification? Pharmacy is requesting a call back to clarify the sig          Action Taken: Message routed to:  [facility] GI    Travel Screening: Not Applicable

## 2023-06-02 NOTE — TELEPHONE ENCOUNTER
Patient is staying with her mother in [place]  Out of prednisone   Order for 2 weeks of prednisone of 40mg daily   Will look at tapering after she starts rinvoq which hopefully her pharmacy has by MOnday.

## 2023-06-02 NOTE — TELEPHONE ENCOUNTER
Patient sent a my chart late yesterday that will not be able to start Rinvoq until Monday due to her pharmacy.

## 2023-06-02 NOTE — LETTER
"    2023         RE: Poonam Berg  [address] [facility] [address]        Dear Colleague,    Thank you for referring your patient, Poonam Berg, to the [facility] [place]. Please see a copy of my visit note below.    Colon and Rectal Surgery Virtual Note    RE: Poonam Berg.  : 1995.  VIVEK: 2023.    Poonam Berg is a 27 year old female who is being evaluated via a billable video visit.      The patient has been notified of following:     \"This video visit will be conducted via a call between you and your physician/provider. We have found that certain health care needs can be provided without the need for an in-person physical exam.  This service lets us provide the care you need with a video conversation.  If a prescription is necessary we can send it directly to your pharmacy.  If lab work is needed we can place an order for that and you can then stop by our lab to have the test done at a later time.    Video visits are billed at different rates depending on your insurance coverage.  Please reach out to your insurance provider with any questions.    If during the course of the call the physician/provider feels a video visit is not appropriate, you will not be charged for this service.\"    Patient has given verbal consent for Video visit? Yes    Reason for visit: Medically refractory chronic ulcerative pancolitis.     HPI: 27-year-old woman with history of medically refractory ulcerative pancolitis and recurrent C. difficile infections status post FMT in 2023 with subsequent persistent severe UC flare refractory to medical therapy. Currently having 6-8 liquid to mushy bowel movements with occasional blood with various degrees of urgency but denies fecal incontinence.  No previous anorectal operations.  Tolerating diet well.      IBD HISTORY  Age at diagnosis: 20.  Extent of disease: pancolitis.  Prior UC surgeries: none.  IBD FH: aunt and 2nd cousing with mild form of UC. " No Crohn's disease.  Prior IBD Medications:  - Canasa  - Infliximab 5mg/kg (400mg) q6  - Azathioprine 150mg (sepetmebr 2018 - Summer 2022) - de-escalated due to   - Adalimumab weekly (started Nov 2018, dose escalated Nov 2019).  - Vedolizumab (started fall 2022).  - Prednisone 40->60mg over the past 2 months given severe flare.  - Upadacitinib (to start 6/5/23).      Colonoscopy 5.31.23  Findings:        The terminal ileum appeared normal.        Inflammation was found in a continuous and circumferential pattern from        the anus to the hepatic flexure. This was graded as Rocha Score 3        (severe, massive ulcerations). Biopsies were taken with a cold forceps        for histology. Many pseudopolyps.        Inflammation was found in a continuous and circumferential pattern from        the hepatic flexure to the cecum. This was graded as Rocha Score 2        (moderate, scattered ulcerations). Biopsies were taken with a cold        forceps for histology. Pseudopolyps present    Final Diagnosis   A. RIGHT COLON, RANDOM BIOPSY:  -Moderate chronic active colitis with crypt abscess formation (Veronica grade 3)  -CMV immunoperoxidase stain is negative, appropriate control obtained  -No evidence of dysplasia    B. LEFT COLON, RANDOM BIOPSY:  -Moderate chronic active colitis with crypt abscess formation and focal erosion (Veronica grade 3-4)  -CMV immunoperoxidase stain is negative, appropriate control obtained  -No evidence of dysplasia         Flexible Sigmoidoscopy 2.27.23                                                                  Findings:        Examined colon was mostly normal. At 25cm from the anus, there was an        area of dense pseudopolyps with some associated inflammation of the        pseudopolyps and adjacent colon. This focal area was consistent with        eMayo 2 inflammation. Otherwise, inflammation was not found based on the        endoscopic appearance of the mucosa in the colon. This was graded as         Rocha Score 0 (normal or inactive disease). Biopsies were taken with a        cold forceps for histology. Verification of patient identification for        the specimen was done. Estimated blood loss was minimal.        Diffuse pseudopolyps were found in the sigmoid colon, in the descending        colon and in the transverse colon.                                                                                     Impression:               Overall, this represents a good response to                             vedolizumab. The calprotectin elevated is likely                             reflective of pseudopolyps with some associate                             inflammation on the polyps. Therefore calprotectin                             is not the best measurement of her disease                             activity. Given her response to vancomycin, it is                             possible she had a superimposed C difficile                             infection (PCR+ / Toxin - ) while in vedolizumab                             induction.                             - Inactive (Rocha Score 0) quiescent ulcerative                             colitis. Biopsied: Left colon (mix of normal colon                             and inflamed mucosa at 25cm) and rectum.                             - Pseudopolyps in the sigmoid colon, in the                             descending colon and in the transverse colon.     A: Large intestine, descending, biopsies:  -Mildly active, chronic colitis without evidence of granulomas or atypia     B: Large intestine, rectum, biopsies:  -Benign colonic mucosa with minimal gland architecture distortion, no evidence of acute inflammation and no other abnormalities    Medical history:  Past Medical History:   Diagnosis Date     Ulcerative colitis (H)        Surgical history:  Past Surgical History:   Procedure Laterality Date     COLONOSCOPY N/A 5/31/2023    Procedure: COLONOSCOPY, WITH  BIOPSY;  Surgeon: Gabino Krueger MD;  [place] [facility] [place]       Family history:  No family history on file.    Medications:  Current Outpatient Medications   Medication Sig Dispense Refill     dicyclomine (BENTYL) 10 MG capsule Take 10 mg by mouth 2 times daily as needed       ondansetron (ZOFRAN) 4 MG tablet Take 1 tablet (4 mg) by mouth every 6 hours as needed for nausea or vomiting 20 tablet 1     predniSONE (DELTASONE) 5 MG tablet Take 8 tablets (40 mg) by mouth daily for 14 days, THEN 6 tablets (30 mg) daily for 7 days, THEN 4 tablets (20 mg) daily for 7 days, THEN 3 tablets (15 mg) daily for 7 days, THEN 2 tablets (10 mg) daily for 7 days, THEN 1 tablet (5 mg) daily for 7 days, THEN 1 tablet (5 mg) every other day for 7 days. 227 tablet 0     EPINEPHrine (ANY BX GENERIC EQUIV) 0.3 MG/0.3ML injection 2-pack Inject 0.3 mLs (0.3 mg) into the muscle as needed for anaphylaxis May repeat one time in 5-15 minutes if response to initial dose is inadequate. 2 each 0     prednisoLONE 5 MG tablet Take 6 tablets (30 mg) by mouth 2 times daily for 14 days, THEN 4 tablets (20 mg) daily for 7 days, THEN 3 tablets (15 mg) daily for 7 days, THEN 2 tablets (10 mg) daily for 7 days, THEN 1 tablet (5 mg) daily for 7 days, THEN 1 tablet (5 mg) every other day for 7 days. (Patient not taking: Reported on 6/2/2023) 157 tablet 0     Upadacitinib ER 45 MG TB24 Take 45 mg by mouth daily 56 tablet 0     vedolizumab (ENTYVIO) 60 MG/ML injection          Allergies:  Allergies   Allergen Reactions     Infliximab Nausea and Other (See Comments)     FACE FLUSHING WITH ITCHY/SCRATCHY THROAT.        Social history:   Social History     Tobacco Use     Smoking status: Never     Smokeless tobacco: Never   Vaping Use     Vaping status: Never Used   Substance Use Topics     Alcohol use: Yes     Comment: Occasional     Marital status: .    Investigations:  None.    Procedures:  None.    ASSESSMENT  27-year-old female with medically  refractory ulcerative pancolitis with severe flare since FMT in April 2023.  History of recurrent C. difficile infection which may be a surrogate for disease severity.  We discussed the role and impact of operative treatment, mainly 2 approaches with total abdominal proctocolectomy with end ileostomy versus total abdominal proctocolectomy with ileal pouch-anal anastomosis and temporary diverting loop ileostomy. This would be performed in 2 or 3 operations, respectively. We discussed the long-term side effects and possible sequelae of these including but not limited to anastomotic leak, pouch failure requiring permanent fecal diversion, fecal seepage and incontinence, pouchitis, infertility, potential conversion to Crohn's disease, refractory diarrhea, recurrent bowel obstructions, dehydration, kidney failure, and kidney stones. Risks, benefits, and alternatives of operative treatment were thoroughly discussed with the patient, he/she understands these well and agrees to proceed.    PLAN  1.  Poonam would like to see how she responds to Rinvoq and will get back to us on her response after considering surgical options.    Video-Visit Details    Type of service:  Video Visit    Originating Location (pt. Location): Home    Distant Location (provider location):  [facility]     Mode of Communication:  Video Conference via [employer].    60 minutes spent on the date of the encounter doing chart review, history, review of imaging, documentation ,and further activities as noted above, which includes my time spent on video with the patient/family.     Gumaro Damon M.D., M.Sc.     Division of Colon and Rectal Surgery  [facility]    Referring Provider:  Theodore Moeller MD    Primary Care Provider:  Savanah Richard       Again, thank you for allowing me to participate in the care of your patient.        Sincerely,        Gumaro Damon,  MD

## 2023-06-05 ENCOUNTER — TELEPHONE (OUTPATIENT)
Dept: GASTROENTEROLOGY | Facility: CLINIC | Age: 28
End: 2023-06-05

## 2023-06-05 ENCOUNTER — VIRTUAL VISIT (OUTPATIENT)
Dept: GASTROENTEROLOGY | Facility: CLINIC | Age: 28
End: 2023-06-05
Payer: COMMERCIAL

## 2023-06-05 VITALS — WEIGHT: 170 LBS | BODY MASS INDEX: 24.34 KG/M2 | HEIGHT: 70 IN

## 2023-06-05 DIAGNOSIS — K51.00 ULCERATIVE PANCOLITIS (H): Primary | ICD-10-CM

## 2023-06-05 PROCEDURE — 99215 OFFICE O/P EST HI 40 MIN: CPT | Mod: VID | Performed by: INTERNAL MEDICINE

## 2023-06-05 ASSESSMENT — PAIN SCALES - GENERAL: PAINLEVEL: MODERATE PAIN (5)

## 2023-06-05 NOTE — PROGRESS NOTES
Virtual Visit Details    Type of service:  Video Visit     Originating Location (pt. Location): Home  Distant Location (provider location):  On-site  Platform used for Video Visit: Alain[employer]

## 2023-06-05 NOTE — TELEPHONE ENCOUNTER
Spoke with Pooanm and relayed MD message/recommendations.  Cancel appointment with PRAFUL Grace and Walter. Poonam states she is doing better then before and we will continue to keep in touch. She has contact # and will call if any questions or concerns.    Patient acknowledged understanding and agree with plan. Answer all questions offered contact information for pt to call with any questions or concerns.

## 2023-06-05 NOTE — PATIENT INSTRUCTIONS
-- Continue Rinvoq daily    -- Taper prednisone 5mg every 5-7 days    -- If not getting better over next few weeks, we will consider restarting oral vancomycin    -- OK to use steroid foam and bentyl as needed / tolerated.     We will coordinate close follow-up

## 2023-06-05 NOTE — NURSING NOTE
Is the patient currently in the state of [place]? YES    Visit mode:VIDEO    If the visit is dropped, the patient can be reconnected by: VIDEO VISIT: Text to cell phone: [phone]    Will anyone else be joining the visit? NO      How would you like to obtain your AVS? MyChart    Are changes needed to the allergy or medication list? YES: Taking Renvoq    Reason for visit: RECHECK

## 2023-06-05 NOTE — PROGRESS NOTES
IBD CLINIC VISIT     ASSESSMENT/PLAN  27 year old reji with Ulcerative pancolitis     1. Ulcerative colitis, pancolitis:   Current UC medications:   -- Prednisone 40mg daily (May, 2023)   -- Upatacitinib (6/5/23)  Current clinical disease activity: Rocha score: 11/12 (severe)  Last endoscopic disease activity: eMayo 3 (5/31/23)    Clearly she has severe ulcerative colitis at this time that is steroid refractory on vedolizumab every 8 weeks.  It would not be unreasonable to have her hospitalized for IV steroids at this time.  However given that we have been able to urgently start upadacitinib, we will hold off on hospitalization and continue to manage her as an outpatient.      It is unclear if she ever responded to vedolizumab.  Her C. difficile toxin in December was discordant, PCR positive but toxin A/B negative.  She had just started vedolizumab within 4 weeks.  While we presumptively treated for C. difficile, it is possible that was just a marker of her severe colitis.    She then remained on p.o. vancomycin throughout the rest of her vedolizumab course.  Flexible sigmoidoscopy which showed a near normal colon was on vedolizumab plus p.o. vancomycin.  Therefore is possible she was responding to vancomycin and not the vedolizumab.  After stopping the vancomycin undergoing FMT she had a severe colitis flare.  We discussed it was certainly possible that the FMT precipitated this flare.  Interestingly PCR testing of her stool about a month after FMT demonstrated E. coli.    I suspect that her pathogen flickering represents the severity of her colitis.  It may be that she has a colitis that is driven by dysbiosis and therefore she may respond best to oral antibiotics.    We discussed the importance of minimizing prednisone at this time.  She has started upadacitinib today and we will see how she responds over the next 1 to 4 weeks.  If she is not making progress over that time I will restart her on p.o. vancomycin.      Lastly we discussed the importance of surgery.  She has met with colorectal surgery at this time.  We will continue to hope that she has medical response of colitis but if not then we will pursue surgery.    -- Continue upadacitinib induction: I expect she will need prolonged induction for 16 weeks  -- Taper prednisone by 5 mg every 5 to 7 days  -- Okay to use rectal steroid foam or Bentyl as needed for urgency  -- If not getting better over the next few weeks plan to reinitiate p.o. vancomycin as she may have vancomycin sensitive colitis  -- Weekly check-ins with our pharmacy and/or nursing staff to assess her symptoms formally    We did discuss upadacitinib in pregnancy.  If she responds tofacitinib it is a contraindication to be used in pregnancy.  It is possible we could transition her to Ustekinumab.  However if she is not in deep remission on upadacitinib then we will need to consider colectomy prior to pregnancy as the safest option.    2. Iron deficiency anemia, Celiac serology negative. Could be slow occult bleeding from pseudopolyps.  At this time expect she will have recurrent iron deficiency anemia given her colitis  -- Trend for anemia  -- iron supplements     3. History of C Diff infection: 2018 and 2022.  December 22 was discordant testing.  See discussion above regarding colonization and marker of severe colitis versus infection.  --Avoid FMT in the future given possibility of stimulating ulcerative colitis flare    4. Dysplasia surveillance: Last colonoscopy 6/2019. Start at 8 years after IBD diagnosis.  -- Surveillance colonoscopy in fall 2023 / winter 2024.  We will need to coordinate once this severe flare resolves    Return to clinic in 4-6 weeks.    Thank you for this consultation.  It was a pleasure to participate in the care of this patient; please contact us with any further questions.  A total of 40 minutes was spent with this patient on the date of the encounter, 6/5/2023, in the  following care activities: Chart review, patient care and documentation.      Theodore Moeller MD MS    [facility]  Inflammatory Bowel Disease Program  Division of Gastroenterology, Hepatology and Nutrition  Pager: [phone]    IBD HISTORY  Age at diagnosis: 20  Extent of disease: pancolitis  Prior UC surgeries:  Prior IBD Medications:   - Canasa   - Infliximab 5mg/kg (400mg) q6   - Azathioprine 150mg (sepetme2018 - Summer 2022) - de-escalated due to    - Adalimumab weekly (started 2018, dose escalated 2019)   - Vedolizumab: 2022 - May 2023 - loss of respose q8 dosing     DRUG MONITORING  TPMT enzyme activity:      6-TGN/6-MMPN levels:  7/15/20: 6-TGN: 137, 6MMPN 3888     Biologic concentration:  7/3/18: IFX: <1, Ab 30 (normal < 50) - Rocha test (q8 weeks, monotherapy)  18: IFX: Not detected, antibodies: 14     11/15/19: Adalimumab: 3.2, no antibodies     HPI:   Patient seen urgently today for severe, steroid refractory colitis.  Having 15-20 stools per day. Evening is worse, most cramping, small amounts of material, lots of urgency. Blood is decreasing    Rocha score:   Stool freq: 3 (>4 stools/day more than normal)  Rectal bleedin (Visible blood more than half of the time)  PGA: 3 (Severe)  Endoscopy: 3 (Severe)    Remission: <3   Mild disease: 3-5  Moderate disease: 6-10  Severe disease: >10     Extra intestinal manifestations of IBD:  No uveitis/episcleritis  No: aphthous ulcers   No arthritis   No erythema nodosum/pyoderma gangrenosum.     sIBDQ:  IBDQ Score Date IBDQ - Total Score  (Higher score better)   3/14/2023   8:55 AM 62   11/15/2019  11:36 AM 57        ROS:    Constitutional, HEENT, cardiovascular, pulmonary, GI, , musculoskeletal, neuro, skin, endocrine and psych systems are negative, except as otherwise noted.    PHYSICAL EXAMINATION:  Constitutional: aaox3, cooperative, pleasant, not dyspneic/diaphoretic, no acute distress  Vitals reviewed: Ht 1.778 m  "(5' 10\")   Wt 77.1 kg (170 lb)   BMI 24.39 kg/m    Wt:   Wt Readings from Last 2 Encounters:   06/05/23 77.1 kg (170 lb)   05/31/23 81.6 kg (180 lb)      Eyes: Sclera anicteric/injected  Ears/nose/mouth/throat: Normal oropharynx without ulcers or exudate, mucus membranes moist, hearing intact  Neck: supple, thyroid normal size  CV: No edema  Respiratory: Unlabored breathing  Lymph: No axillary, submandibular, supraclavicular or inguinal lymphadenopathy  Abd: Nondistended, +bs, no hepatosplenomegaly, nontender, no peritoneal signs  Skin: warm, perfused, no jaundice  Psych: Normal affect  MSK: Normal gait      PERTINENT PAST MEDICAL HISTORY:  Past Medical History:   Diagnosis Date     Ulcerative colitis (H)        PREVIOUS SURGERIES:  Past Surgical History:   Procedure Laterality Date     COLONOSCOPY N/A 5/31/2023    Procedure: COLONOSCOPY, WITH BIOPSY;  Surgeon: Gabino Krueger MD;  Location: [facility] OR       ALLERGIES:     Allergies   Allergen Reactions     Infliximab Nausea and Other (See Comments)     FACE FLUSHING WITH ITCHY/SCRATCHY THROAT.        PERTINENT MEDICATIONS:    Current Outpatient Medications:      dicyclomine (BENTYL) 10 MG capsule, Take 10 mg by mouth 2 times daily as needed, Disp: , Rfl:      EPINEPHrine (ANY BX GENERIC EQUIV) 0.3 MG/0.3ML injection 2-pack, Inject 0.3 mLs (0.3 mg) into the muscle as needed for anaphylaxis May repeat one time in 5-15 minutes if response to initial dose is inadequate., Disp: 2 each, Rfl: 0     ondansetron (ZOFRAN) 4 MG tablet, Take 1 tablet (4 mg) by mouth every 6 hours as needed for nausea or vomiting, Disp: 20 tablet, Rfl: 1     predniSONE (DELTASONE) 5 MG tablet, Take 8 tablets (40 mg) by mouth daily for 14 days, Disp: 112 tablet, Rfl: 0     predniSONE (DELTASONE) 5 MG tablet, Take 8 tablets (40 mg) by mouth daily for 14 days, THEN 6 tablets (30 mg) daily for 7 days, THEN 4 tablets (20 mg) daily for 7 days, THEN 3 tablets (15 mg) daily for 7 days, THEN 2 " tablets (10 mg) daily for 7 days, THEN 1 tablet (5 mg) daily for 7 days, THEN 1 tablet (5 mg) every other day for 7 days., Disp: 227 tablet, Rfl: 0     Upadacitinib ER 45 MG TB24, Take 45 mg by mouth daily, Disp: 56 tablet, Rfl: 0     prednisoLONE 5 MG tablet, Take 6 tablets (30 mg) by mouth 2 times daily for 14 days, THEN 4 tablets (20 mg) daily for 7 days, THEN 3 tablets (15 mg) daily for 7 days, THEN 2 tablets (10 mg) daily for 7 days, THEN 1 tablet (5 mg) daily for 7 days, THEN 1 tablet (5 mg) every other day for 7 days. (Patient not taking: Reported on 6/2/2023), Disp: 157 tablet, Rfl: 0     vedolizumab (ENTYVIO) 60 MG/ML injection, , Disp: , Rfl:     SOCIAL HISTORY:  Social History     Socioeconomic History     Marital status:      Spouse name: Not on file     Number of children: Not on file     Years of education: Not on file     Highest education level: Not on file   Occupational History     Not on file   Tobacco Use     Smoking status: Never     Smokeless tobacco: Never   Vaping Use     Vaping status: Never Used   Substance and Sexual Activity     Alcohol use: Yes     Comment: Occasional     Drug use: Not Currently     Sexual activity: Not on file   Other Topics Concern     Not on file   Social History Narrative     Not on file     Social Determinants of Health     Financial Resource Strain: Not on file   Food Insecurity: Not on file   Transportation Needs: Not on file   Physical Activity: Not on file   Stress: Not on file   Social Connections: Not on file   Intimate Partner Violence: Not on file   Housing Stability: Not on file     FAMILY HISTORY:  No FH of IBD/CRC.  No family history on file.    Past/family/social history reviewed and no changes

## 2023-06-05 NOTE — TELEPHONE ENCOUNTER
----- Message from Theodore Moeller MD sent at 6/5/2023  4:45 PM CDT -----  Hey    Can we cancel the appointments with Brock and Jess?     Between all of us, can we do a weekly check in on her to see how she is doing?    If not making noticeable improvements in next 2-4 weeks (50% reduction in symptoms and steroids consistently under 25mg), then lets re-start her vancomycin    She actually started PO vanco around the time of Entyvio (November/dec).  Her c diff testing was discordant, which actually suggests colonization    So I am wondering if for some reason she responds to p.o. vancomycin in terms of her ulcerative colitis instead of traditional therapy.  The flex sig in February was on vedolizumab and p.o. vancomycin.  When we stopped the vancomycin she got worse within a week.    Anyway hopefully she responds to her invoke.    Theodore

## 2023-06-06 ENCOUNTER — TELEPHONE (OUTPATIENT)
Dept: GASTROENTEROLOGY | Facility: CLINIC | Age: 28
End: 2023-06-06
Payer: COMMERCIAL

## 2023-06-06 DIAGNOSIS — K51.00 ULCERATIVE PANCOLITIS WITHOUT COMPLICATION (H): ICD-10-CM

## 2023-06-06 RX ORDER — PREDNISONE 5 MG/1
TABLET ORAL
Qty: 227 TABLET | Refills: 0 | Status: SHIPPED | OUTPATIENT
Start: 2023-06-06 | End: 2023-07-25

## 2023-06-06 RX ORDER — PREDNISOLONE 5 MG/1
TABLET ORAL
Qty: 157 TABLET | Refills: 0 | Status: SHIPPED | OUTPATIENT
Start: 2023-06-06 | End: 2023-06-06

## 2023-06-06 NOTE — TELEPHONE ENCOUNTER
Spoke with Christine - pharmacy intern, reorder prednisone tapering. Confirm Rx order and gave a verbal.

## 2023-06-06 NOTE — TELEPHONE ENCOUNTER
6/5/23 VV Plan:  -- Continue upadacitinib induction: I expect she will need prolonged induction for 16 weeks  -- Taper prednisone by 5 mg every 5 to 7 days  -- Okay to use rectal steroid foam or Bentyl as needed for urgency  -------------------------------------------------------  Poonam called, shestarted tapering. She is on 35mg Prednisone and has 4 days left. She will taper to 30 mg and will need a refill.    Reorder Prednisone tapering schedule.

## 2023-06-06 NOTE — TELEPHONE ENCOUNTER
[facility] Call Center    Phone Message    May a detailed message be left on voicemail: yes     Reason for Call: Medication Question or concern regarding medication   Prescription Clarification  Name of Medication: prednisoLONE 5 MG tablet  Prescribing Provider: Dr. Theodore Moeller   Pharmacy: [id number]   What on the order needs clarification? Pharmacy called as they do not have this medication in stock and do not carry it.  Would Dr. Moeller want to prescribe another kind of steroid medication?  Please follow up with pharmacy or send new RX.      Action Taken: Message routed to:  [facility]    Travel Screening: Not Applicable

## 2023-06-13 NOTE — TELEPHONE ENCOUNTER
Theodore Moeller MD  You; Camilo Rodriguez PA-C; Grecia Mcgregor, PharmD 20 minutes ago (4:15 PM)     BV  Thank you!   Any chance we can get her a flex sig with Teresa Dumont around July 31?     SHould be about 8-weeks post upa initiation.     Please also continue with weekly to every-other-week check ins     Thanks!       --------------------------------------------------------------  Order flex sig. Spoke with Endoscopy scheduling. The first available is on September 11.    Spoke with Poonam and relayed MD message/recommendations. Ask pt to get her bok the 9/11/23 appt but we will work on getting her a different appt around the first week of August. Patient acknowledged understanding and agree with plan. Answer all questions offered contact information for pt to call with any questions or concerns.

## 2023-06-13 NOTE — TELEPHONE ENCOUNTER
"Dr. Moeller,  Spoke with Poonam, she states she is \"doing better.\"   Symptoms update:  decrease in BM frequency, only 1-2 nighttime urgency, about 5 BM/day.   BM are loose but notice that they are getting more form.   Less blood. Not pronounce bright red blood in toilet bowl. Just a little.  Still some cramping but not like before.    Thank you.  Nichole"

## 2023-06-14 NOTE — TELEPHONE ENCOUNTER
Endoscopy Scheduling,  Please assist in getting pt a Flex sig appointment around July 31 for RiInvoq Acessment and staging with Dr. Mcgregor if possible.    Thank you.  Nichole

## 2023-06-15 ENCOUNTER — TELEPHONE (OUTPATIENT)
Dept: GASTROENTEROLOGY | Facility: CLINIC | Age: 28
End: 2023-06-15
Payer: COMMERCIAL

## 2023-06-15 ENCOUNTER — HOSPITAL ENCOUNTER (OUTPATIENT)
Facility: CLINIC | Age: 28
End: 2023-06-15
Attending: INTERNAL MEDICINE | Admitting: INTERNAL MEDICINE
Payer: COMMERCIAL

## 2023-06-15 NOTE — TELEPHONE ENCOUNTER
"Endoscopy Scheduling Screen    Have you had a positive Covid test in the last 14 days?  No    Are you active on MyChart?   Yes    What insurance is in the chart?  BC/BS: Schedule in ASC unless patient meets exclusion criteria.     Ordering/Referring Provider: Theodore Moeller MD    (If ordering provider performs procedure, schedule with ordering provider unless otherwise instructed. )    BMI: Estimated body mass index is 24.39 kg/m  as calculated from the following:    Height as of 6/5/23: 1.778 m (5' 10\").    Weight as of 6/5/23: 77.1 kg (170 lb).     Sedation Ordered  moderate sedation.   If patient BMI > 50 do not schedule in ASC.    Are you taking any prescription medications for pain?   No    Are you taking methadone or Suboxone?  No    Do you have a history of malignant hyperthermia or adverse reaction to anesthesia?  No    (Females) Are you currently pregnant?   No     Have you been diagnosed or told you have pulmonary hypertension?   No    Do you have an LVAD?  No    Have you been told you have moderate to severe sleep apnea?  No    Have you been told you have COPD, asthma, or any other lung disease?  No    Do you have any heart conditions?  No     Have you ever had or are you awaiting a heart or lung transplant?   No    Have you had a stroke or transient ischemic attack (TIA aka \"mini stroke\" in the last 6 months?   No    Have you been diagnosed with or been told you have cirrhosis of the liver?   No    Are you currently on dialysis?   No    Do you need assistance transferring?   No    BMI: Estimated body mass index is 24.39 kg/m  as calculated from the following:    Height as of 6/5/23: 1.778 m (5' 10\").    Weight as of 6/5/23: 77.1 kg (170 lb).     Is patients BMI > 40 and scheduling location [facility]?  No    Do you take the medication Phentermine, Ozempic or Wegovy?  No    Do you take the medication Naltrexone?  No    Do you take blood thinners?  No    Preferred Pharmacy:    [id number] - " [place], [place] - [address] [place] [address]  [address] [place] [address]  [place] [address]  Phone: [phone] Fax: [phone]      Prep   Are you currently on dialysis or do you have chronic kidney disease?  No (standard prep)    Do you have a diagnosis of diabetes?  No    Do you have a diagnosis of cystic fibrosis (CF)?  No    On a regular basis do you go 3 -5 days between bowel movements?  No    BMI > 40?  No    Final Scheduling Details   Colonoscopy prep sent?  MiraLAX (No Mag Citrate)    Procedure scheduled  FLEX SIG    Surgeon:  Balbir    Date of procedure:  8/9/23    Schedule PAC:   No    Location  [facility]    Sedation   Moderate Sedation    Patient Reminders:    You will receive a call from a Nurse to review instructions and health history.  This assessment must be completed prior to your procedure.  Failure to complete the Nurse assessment may result in the procedure being cancelled.       On the day of your procedure, please designate an adult(s) who can drive you home stay with you for the next 24 hours. The medicines used in the exam will make you sleepy. You will not be able to drive.       You cannot take public transportation, ride share services, or non-medical taxi service without a responsible caregiver.  Medical transport services are allowed with the requirement that a responsible caregiver will receive you at your destination.  We require that drivers and caregivers are confirmed prior to your procedure.

## 2023-06-16 ENCOUNTER — PATIENT OUTREACH (OUTPATIENT)
Dept: GASTROENTEROLOGY | Facility: CLINIC | Age: 28
End: 2023-06-16
Payer: COMMERCIAL

## 2023-06-16 NOTE — TELEPHONE ENCOUNTER
Poonam called, she states she got a bill for some lab test that the insurance is not covering. She is asking what to do with the bill.    Ask pt to send a copy via [employer] and we will take a look at it.

## 2023-06-19 NOTE — TELEPHONE ENCOUNTER
Dr. Mcgregor,  Dr. Moeller would like to get this pt in the week of August 7 for a flex sig to check the effectiveness of Rinvoq. Do you have any room to overbook this August 7 week. Pt is very flexible.    Thank you.  Nichole

## 2023-06-20 ENCOUNTER — TELEPHONE (OUTPATIENT)
Dept: GASTROENTEROLOGY | Facility: CLINIC | Age: 28
End: 2023-06-20
Payer: COMMERCIAL

## 2023-06-20 NOTE — TELEPHONE ENCOUNTER
----- Message from Nichole Adair RN sent at 6/20/2023  1:15 PM CDT -----  Regarding: RE: Previous message from Dr. Moeller  I will ask her to call. When she calls, should I have her call the regular endoscopy #. And what should she tell the agent. She said she already call yesterday and the person could not help her and just told her there was nothing available.    Thank you.  Nichole    ----- Message -----  From: Ambar Patel  Sent: 6/20/2023  11:46 AM CDT  To: Nichole Adair RN  Subject: RE: Previous message from Dr. Sajan Varela,    If you can have her give us a call back that would be great so we can go through those screening questions. I still have Aug 9th on hold for her!     Ambar   ----- Message -----  From: Nichole Adair RN  Sent: 6/20/2023  11:43 AM CDT  To: Ambar Patel  Subject: RE: Previous message from Dr. Sajan Vilallta,  I spoke with Poonam and she said she tried to call but was told there was nothing available.   She is ok with what ever available slot you have. Can you put her in an available timeslot? I can have her call your team back if needed.    Thank you.  Nichole    ----- Message -----  From: Ambar Patel  Sent: 6/19/2023   3:47 PM CDT  To: Nichole Adair RN  Subject: RE: Previous message from Dr. Sajan Varela,    We have an opening that is on hold for Aug 9th with Dr. Mcgregor for this pt. We have reached out multiple times and will make one more attempt.     Thank you,  Ambar   ----- Message -----  From: Nichole Adair RN  Sent: 6/15/2023   1:06 PM CDT  To: Ambar Patel  Subject: RE: Previous message from Dr. Sajan Villalta,  Pt is willing to come. If we can get her an appointment with Dr. Mcgregor that week will be great.    Thank you.  Nichole    ----- Message -----  From: Ambar Patel  Sent: 6/14/2023   4:49 PM CDT  To: Nichole Adair RN  Subject: Previous message  from Dr. Sajan Varela,    I saw the previous messages regarding the attached pt about scheduling the week of Aug 7th. I don't see any availability was there a certain slot you'd like me to put this pt in?     I will be out of town for the rest of the week but will be happy to call the pt when I get back next week!     Ambar

## 2023-06-20 NOTE — TELEPHONE ENCOUNTER
PER MESSAGES BELOW PT CAN BE ON AUG 9TH. THERE IS A HOLD WITH THE MRN ON. FEEL FREE TO TRANSFER PT TO ME IF HELPS IS NEEDED.

## 2023-06-22 ENCOUNTER — PATIENT OUTREACH (OUTPATIENT)
Dept: GASTROENTEROLOGY | Facility: CLINIC | Age: 28
End: 2023-06-22
Payer: COMMERCIAL

## 2023-06-22 ENCOUNTER — MYC MEDICAL ADVICE (OUTPATIENT)
Dept: GASTROENTEROLOGY | Facility: CLINIC | Age: 28
End: 2023-06-22

## 2023-06-22 NOTE — TELEPHONE ENCOUNTER
Poonam called. She fell a sleep and miss her Rinvoq last night. She woke up around 3 am and took her Rinvoq. Poonam concern that she will need to wake up at 3 am again to take her next dose.    RN advice pt to resume her normal schedule.    Poonam also ask to see if she can take this in the am. But she is taking prednisone in am. She would like to make sure she can take both at the same time. Her current prednisone dose is 20 mg. She will be tapering next week to 15mg.    Symptoms update she feels better. Her urgency has decrease to 1 per night.    Per Judith, pt can take both Prednisone and Rinvoq together in AM.    June 22, 2023    Called Poonam, Left message and contact info to return call regarding: question above    Sent [employer] with above information

## 2023-06-30 NOTE — TELEPHONE ENCOUNTER
June 30, 2023    Called Poonam Left message and contact info to return call regarding: symptoms update    [facility]

## 2023-07-18 DIAGNOSIS — K51.00 ULCERATIVE PANCOLITIS WITHOUT COMPLICATION (H): ICD-10-CM

## 2023-07-21 NOTE — TELEPHONE ENCOUNTER
RINVOQ 45MG ER TABLET      Last Written Prescription Date:  5/31/23  Last Fill Quantity: 56,   # refills: 0  Last Office Visit: 6/5/23  Future Office visit:  9/27/23    CBC RESULTS: Recent Labs   Lab Test 05/31/23  0937   WBC 7.9   RBC 4.35   HGB 11.4*   HCT 34.7*   MCV 80   MCH 26.2*   MCHC 32.9   RDW 18.6*   *       Creatinine   Date Value Ref Range Status   05/31/2023 0.91 0.51 - 0.95 mg/dL Final   ]    Liver Function Studies -   Recent Labs   Lab Test 05/31/23  0937   PROTTOTAL 6.6   ALBUMIN 3.8   BILITOTAL 0.4   ALKPHOS 47   AST 16   ALT 10       Routing refill request to provider for review/approval because:  Drug not on the [facility], [facility] or [facility] refill protocol

## 2023-07-24 ENCOUNTER — TELEPHONE (OUTPATIENT)
Dept: GASTROENTEROLOGY | Facility: CLINIC | Age: 28
End: 2023-07-24
Payer: COMMERCIAL

## 2023-07-24 ENCOUNTER — TELEPHONE (OUTPATIENT)
Dept: GASTROENTEROLOGY | Facility: CLINIC | Age: 28
End: 2023-07-24

## 2023-07-24 ENCOUNTER — MYC MEDICAL ADVICE (OUTPATIENT)
Dept: GASTROENTEROLOGY | Facility: CLINIC | Age: 28
End: 2023-07-24
Payer: COMMERCIAL

## 2023-07-24 RX ORDER — UPADACITINIB 45 MG/1
TABLET, EXTENDED RELEASE ORAL
Qty: 56 TABLET | Refills: 0 | Status: SHIPPED | OUTPATIENT
Start: 2023-07-24 | End: 2023-09-18

## 2023-07-24 NOTE — TELEPHONE ENCOUNTER
Spoke with Poonam pt states she is doing good. No symptoms. Completed her prednisone tapering yesterday.

## 2023-07-24 NOTE — TELEPHONE ENCOUNTER
Rx Refill Request:   Rinvoq 45 mg ER daily   Last Written Prescription Date: 5/31/23.  Last Fill Quantity:  56 tab,   # refills: 0    Future Office visit: 9/27/23  Last Office Visit :  6/5/23  Dx: Ulcerative colitis, pancolitis:   Current UC medications:              -- Prednisone 40mg daily (May, 2023)              -- Upatacitinib (6/5/23)  -- Continue upadacitinib induction: I expect she will need prolonged induction for 16 weeks  -- Taper prednisone by 5 mg every 5 to 7 days  -- Okay to use rectal steroid foam or Bentyl as needed for urgency  -- If not getting better over the next few weeks plan to reinitiate p.o. vancomycin as she may have vancomycin sensitive colitis  -- Weekly check-ins with our pharmacy and/or nursing staff to assess her symptoms formally    Refills sent to [facility] to cover until RV.  Spoke with pt.

## 2023-07-24 NOTE — TELEPHONE ENCOUNTER
Pre assessment completed for upcoming procedure.      Procedure details:    Patient scheduled for Flexible sigmoidoscopy  on 8.9.2023.     Arrival time: 1545. Procedure time 1630    Pre op exam needed? N/A    Facility location: [facility]; [facility] [place] [address]    Sedation type: Conscious sedation     Indication for procedure: Access for Rinvoq Therapy    COVID policy reviewed.    Designated  policy reviewed. Instructed to have someone stay 6 hours post procedure.       Chart review:     Electronic implanted devices? No    Diabetic? No    Diabetic medication HOLDING recommendations: (if applicable)  Oral diabetic medications: N/A  Diabetic injectables: N/A  Insulin: N/A      Medication review:    Anticoagulants? No    NSAIDS? No    Other medication HOLDING recommendations:  N/A      Prep for procedure:     Bowel prep recommendation: Enemas   Due to: standard bowel prep.    Prep instructions sent via [employer]     Reviewed procedure prep instructions.     Patient verbalized understanding and had no questions or concerns at this time.        Jo Hurley RN  Endoscopy Procedure Pre Assessment RN  [phone] option 4

## 2023-07-24 NOTE — TELEPHONE ENCOUNTER
PA Initiation    Medication: USTEKINUMAB 90 MG/ML [facility]  Insurance Company: [employer] - Phone [phone] Fax [phone]  Pharmacy Filling the Rx:    Filling Pharmacy Phone:    Filling Pharmacy Fax:    Start Date: 7/24/2023  [id number]

## 2023-07-26 ENCOUNTER — TELEPHONE (OUTPATIENT)
Dept: GASTROENTEROLOGY | Facility: CLINIC | Age: 28
End: 2023-07-26
Payer: COMMERCIAL

## 2023-07-26 ENCOUNTER — MYC MEDICAL ADVICE (OUTPATIENT)
Dept: GASTROENTEROLOGY | Facility: CLINIC | Age: 28
End: 2023-07-26
Payer: COMMERCIAL

## 2023-07-26 ENCOUNTER — TELEPHONE (OUTPATIENT)
Dept: GASTROENTEROLOGY | Facility: CLINIC | Age: 28
End: 2023-07-26

## 2023-07-26 DIAGNOSIS — K51.00 ULCERATIVE PANCOLITIS WITHOUT COMPLICATION (H): ICD-10-CM

## 2023-07-26 DIAGNOSIS — K51.00 ULCERATIVE PANCOLITIS WITHOUT COMPLICATION (H): Primary | ICD-10-CM

## 2023-07-26 RX ORDER — UPADACITINIB 45 MG/1
TABLET, EXTENDED RELEASE ORAL
Qty: 56 TABLET | Refills: 0 | OUTPATIENT
Start: 2023-07-26

## 2023-07-26 NOTE — TELEPHONE ENCOUNTER
PA Initiation    Medication: RINVOQ 45 MG PO TB24  Insurance Company: [facility] - Phone [phone] Fax [phone]  Pharmacy Filling the Rx:    Filling Pharmacy Phone:    Filling Pharmacy Fax:    Start Date: 7/26/2023  [id number]

## 2023-07-26 NOTE — TELEPHONE ENCOUNTER
[facility] Call Center    Phone Message    May a detailed message be left on voicemail: yes     Reason for Call: Medication Question or concern regarding medication   Prescription Clarification  Name of Medication: RINVOQ 45 MG TB24   Prescribing Provider: Camilo Rodriguez   Pharmacy: [id number]   What on the order needs clarification? What is the status on this RX.  Is it discontinued or is refill RX going to be sent over?  Please Kelli Parada at [phone].      Action Taken: Message routed to:  [facility]    Travel Screening: Not Applicable

## 2023-07-26 NOTE — TELEPHONE ENCOUNTER
Prior Authorization Not Needed per Insurance    Medication: rinvoq  Insurance Company: [employer] - Phone [phone] Fax [phone]  Expected CoPay:      Pharmacy Filling the Rx:    Pharmacy Notified:    Patient Notified:

## 2023-07-26 NOTE — TELEPHONE ENCOUNTER
[facility] Call Center    Phone Message    May a detailed message be left on voicemail: yes     Reason for Call: Medication Refill Request    Has the patient contacted the pharmacy for the refill? Yes   Name of medication being requested: RINVOQ 45 MG TB24  Provider who prescribed the medication: Camilo Rodriguez PA-C  Pharmacy: [id number] - [place], [place] - [address] [place] [address]  Date medication is needed: ASAP    Action Taken: Message routed to:  [facility]    Travel Screening: Not Applicable

## 2023-07-27 NOTE — TELEPHONE ENCOUNTER
Spoke with Saul - pharmacist at [facility], discuss with Saul that we are waiting for the PA. Once we received PA the rx will be release to their pharmacy to get filled.    Per Nain TIERNEY, pt has dual coverage, [employer] [place] has been approved and we are waiting for [facility] insurance.

## 2023-07-27 NOTE — TELEPHONE ENCOUNTER
Spoke with Poonam, she states she is ok with [employer] as the only insurance and will pay out of pocket if needed. Since we are still waiting for [facility] PA Approval.    Ask Nain to release rx to [facility].    Spoke with Saul- pharmacist [facility] to give verbal.

## 2023-07-28 RX ORDER — UPADACITINIB 30 MG/1
30 TABLET, EXTENDED RELEASE ORAL DAILY
Qty: 30 TABLET | Refills: 4 | Status: SHIPPED | OUTPATIENT
Start: 2023-07-28 | End: 2023-12-13

## 2023-07-28 NOTE — TELEPHONE ENCOUNTER
Order Rinvoq 30mg. Per Nain this dose does not need a PA.    Per Judith recommendation. We can appeal the 45mg after the colonoscopy.    Spoke with Poonam and relayed above message/recommendations.  Patient acknowledged understanding and agree with plan. Answer all questions offered contact information for pt to call with any questions or concerns.

## 2023-07-28 NOTE — TELEPHONE ENCOUNTER
Poonam left a VM, she states her insurance [employer] [place] statest that pt has a PA  for 56 pills of Rinvoq for 360 days and now she is getting a 2nd filled. [employer] is asking for another PA.

## 2023-08-07 ENCOUNTER — PATIENT OUTREACH (OUTPATIENT)
Dept: GASTROENTEROLOGY | Facility: CLINIC | Age: 28
End: 2023-08-07
Payer: COMMERCIAL

## 2023-08-07 NOTE — TELEPHONE ENCOUNTER
Poonam called and left a voicemail. Pt is asking if the Rinvoq has side effects that can effect her menstrual cycle. pt states her menstrual cycle was late. She completed 2 home pregnancy to confirm not pregnant. She was asking if Rinvoq can effect her cycle.    August 7, 2023    Called Poonam Left message and contact info to return call regarding: message

## 2023-08-07 NOTE — TELEPHONE ENCOUNTER
Poonam called, pt states after she left  on Friday she had her period afterwards and is ok. She will be in the city for her flex sig on 8/9 and asking to get labs completed. Schedule labs.

## 2023-08-09 ENCOUNTER — LAB (OUTPATIENT)
Dept: LAB | Facility: CLINIC | Age: 28
End: 2023-08-09
Attending: INTERNAL MEDICINE
Payer: COMMERCIAL

## 2023-08-09 ENCOUNTER — HOSPITAL ENCOUNTER (OUTPATIENT)
Facility: CLINIC | Age: 28
Discharge: HOME | End: 2023-08-09
Attending: INTERNAL MEDICINE | Admitting: INTERNAL MEDICINE
Payer: COMMERCIAL

## 2023-08-09 VITALS
SYSTOLIC BLOOD PRESSURE: 105 MMHG | WEIGHT: 170 LBS | HEIGHT: 70 IN | OXYGEN SATURATION: 100 % | DIASTOLIC BLOOD PRESSURE: 70 MMHG | BODY MASS INDEX: 24.34 KG/M2 | RESPIRATION RATE: 16 BRPM | HEART RATE: 70 BPM

## 2023-08-09 DIAGNOSIS — K51.00 ULCERATIVE PANCOLITIS WITHOUT COMPLICATION (H): ICD-10-CM

## 2023-08-09 LAB
ALBUMIN SERPL BCG-MCNC: 4.4 G/DL (ref 3.5–5.2)
ALP SERPL-CCNC: 38 U/L (ref 35–104)
ALT SERPL W P-5'-P-CCNC: 10 U/L (ref 0–50)
ANION GAP SERPL CALCULATED.3IONS-SCNC: 11 MMOL/L (ref 7–15)
AST SERPL W P-5'-P-CCNC: 13 U/L (ref 0–45)
BILIRUB SERPL-MCNC: 0.3 MG/DL
BUN SERPL-MCNC: 11 MG/DL (ref 6–20)
CALCIUM SERPL-MCNC: 9 MG/DL (ref 8.6–10)
CHLORIDE SERPL-SCNC: 105 MMOL/L (ref 98–107)
CHOLEST SERPL-MCNC: 200 MG/DL
CREAT SERPL-MCNC: 0.76 MG/DL (ref 0.51–0.95)
CRP SERPL-MCNC: <3 MG/L
DEPRECATED HCO3 PLAS-SCNC: 24 MMOL/L (ref 22–29)
ERYTHROCYTE [SEDIMENTATION RATE] IN BLOOD BY WESTERGREN METHOD: 20 MM/HR (ref 0–20)
FLEXIBLE SIGMOIDOSCOPY: NORMAL
GFR SERPL CREATININE-BSD FRML MDRD: >90 ML/MIN/1.73M2
GLUCOSE SERPL-MCNC: 92 MG/DL (ref 70–99)
HDLC SERPL-MCNC: 37 MG/DL
LDLC SERPL CALC-MCNC: 137 MG/DL
NONHDLC SERPL-MCNC: 163 MG/DL
POTASSIUM SERPL-SCNC: 3.9 MMOL/L (ref 3.4–5.3)
PROT SERPL-MCNC: 6.8 G/DL (ref 6.4–8.3)
SODIUM SERPL-SCNC: 140 MMOL/L (ref 136–145)
TRIGL SERPL-MCNC: 130 MG/DL

## 2023-08-09 PROCEDURE — 85652 RBC SED RATE AUTOMATED: CPT

## 2023-08-09 PROCEDURE — 80061 LIPID PANEL: CPT

## 2023-08-09 PROCEDURE — 88342 IMHCHEM/IMCYTCHM 1ST ANTB: CPT | Mod: 26 | Performed by: PATHOLOGY

## 2023-08-09 PROCEDURE — 250N000011 HC RX IP 250 OP 636: Performed by: INTERNAL MEDICINE

## 2023-08-09 PROCEDURE — 88305 TISSUE EXAM BY PATHOLOGIST: CPT | Mod: 26 | Performed by: PATHOLOGY

## 2023-08-09 PROCEDURE — 80053 COMPREHEN METABOLIC PANEL: CPT

## 2023-08-09 PROCEDURE — 250N000013 HC RX MED GY IP 250 OP 250 PS 637: Performed by: INTERNAL MEDICINE

## 2023-08-09 PROCEDURE — 36415 COLL VENOUS BLD VENIPUNCTURE: CPT

## 2023-08-09 PROCEDURE — 86140 C-REACTIVE PROTEIN: CPT

## 2023-08-09 PROCEDURE — 45331 SIGMOIDOSCOPY AND BIOPSY: CPT | Performed by: INTERNAL MEDICINE

## 2023-08-09 PROCEDURE — G0500 MOD SEDAT ENDO SERVICE >5YRS: HCPCS | Performed by: INTERNAL MEDICINE

## 2023-08-09 PROCEDURE — 88305 TISSUE EXAM BY PATHOLOGIST: CPT | Mod: TC | Performed by: INTERNAL MEDICINE

## 2023-08-09 RX ORDER — ONDANSETRON 2 MG/ML
4 INJECTION INTRAMUSCULAR; INTRAVENOUS
Status: DISCONTINUED | OUTPATIENT
Start: 2023-08-09 | End: 2023-08-09 | Stop reason: HOSPADM

## 2023-08-09 RX ORDER — SIMETHICONE 40MG/0.6ML
SUSPENSION, DROPS(FINAL DOSAGE FORM)(ML) ORAL PRN
Status: DISCONTINUED | OUTPATIENT
Start: 2023-08-09 | End: 2023-08-09 | Stop reason: HOSPADM

## 2023-08-09 RX ORDER — NALOXONE HYDROCHLORIDE 0.4 MG/ML
0.4 INJECTION, SOLUTION INTRAMUSCULAR; INTRAVENOUS; SUBCUTANEOUS
Status: DISCONTINUED | OUTPATIENT
Start: 2023-08-09 | End: 2023-08-09 | Stop reason: HOSPADM

## 2023-08-09 RX ORDER — FLUMAZENIL 0.1 MG/ML
0.2 INJECTION, SOLUTION INTRAVENOUS
Status: DISCONTINUED | OUTPATIENT
Start: 2023-08-09 | End: 2023-08-09 | Stop reason: HOSPADM

## 2023-08-09 RX ORDER — PROCHLORPERAZINE MALEATE 10 MG
10 TABLET ORAL EVERY 6 HOURS PRN
Status: DISCONTINUED | OUTPATIENT
Start: 2023-08-09 | End: 2023-08-09 | Stop reason: HOSPADM

## 2023-08-09 RX ORDER — FENTANYL CITRATE 50 UG/ML
INJECTION, SOLUTION INTRAMUSCULAR; INTRAVENOUS PRN
Status: DISCONTINUED | OUTPATIENT
Start: 2023-08-09 | End: 2023-08-09 | Stop reason: HOSPADM

## 2023-08-09 RX ORDER — NALOXONE HYDROCHLORIDE 0.4 MG/ML
0.2 INJECTION, SOLUTION INTRAMUSCULAR; INTRAVENOUS; SUBCUTANEOUS
Status: DISCONTINUED | OUTPATIENT
Start: 2023-08-09 | End: 2023-08-09 | Stop reason: HOSPADM

## 2023-08-09 RX ORDER — LIDOCAINE 40 MG/G
CREAM TOPICAL
Status: DISCONTINUED | OUTPATIENT
Start: 2023-08-09 | End: 2023-08-09 | Stop reason: HOSPADM

## 2023-08-09 RX ORDER — ONDANSETRON 4 MG/1
4 TABLET, ORALLY DISINTEGRATING ORAL EVERY 6 HOURS PRN
Status: DISCONTINUED | OUTPATIENT
Start: 2023-08-09 | End: 2023-08-09 | Stop reason: HOSPADM

## 2023-08-09 RX ORDER — ONDANSETRON 2 MG/ML
4 INJECTION INTRAMUSCULAR; INTRAVENOUS EVERY 6 HOURS PRN
Status: DISCONTINUED | OUTPATIENT
Start: 2023-08-09 | End: 2023-08-09 | Stop reason: HOSPADM

## 2023-08-09 ASSESSMENT — ACTIVITIES OF DAILY LIVING (ADL): ADLS_ACUITY_SCORE: 35

## 2023-08-09 NOTE — H&P
"Poonam LONGORIA [facility]  [id number]  female  27 year old      Reason for procedure/surgery: ulcerative colitis    Patient Active Problem List   Diagnosis    Ulcerative pancolitis (H)    Ulcerative pancolitis without complication (H)    Nausea and vomiting    Diarrhea    Iron deficiency       Past Surgical History:    Past Surgical History:   Procedure Laterality Date    COLONOSCOPY N/A 5/31/2023    Procedure: COLONOSCOPY, WITH BIOPSY;  Surgeon: Gabino Krueger MD;  [place] [facility]       Past Medical History:   Past Medical History:   Diagnosis Date    Ulcerative colitis (H)        Social History:   Social History     Tobacco Use    Smoking status: Never    Smokeless tobacco: Never   Substance Use Topics    Alcohol use: Yes     Comment: 1-2 monthly       Family History: History reviewed. No pertinent family history.    Allergies:   Allergies   Allergen Reactions    Infliximab Nausea and Other (See Comments)     FACE FLUSHING WITH ITCHY/SCRATCHY THROAT.        Active Medications:   No current outpatient medications on file.       Systemic Review:   CONSTITUTIONAL: NEGATIVE for fever, chills, change in weight  ENT/MOUTH: NEGATIVE for ear, mouth and throat problems  RESP: NEGATIVE for significant cough or SOB  CV: NEGATIVE for chest pain, palpitations or peripheral edema    Physical Examination:   Vital Signs: /67   Pulse 68   Resp 16   Ht 1.778 m (5' 10\")   Wt 77.1 kg (170 lb)   SpO2 100%   BMI 24.39 kg/m    GENERAL: healthy, alert and no distress  NECK: no adenopathy, no asymmetry, masses, or scars  RESP: lungs clear to auscultation - no rales, rhonchi or wheezes  CV: regular rate and rhythm, normal S1 S2, no S3 or S4, no murmur, click or rub, no peripheral edema and peripheral pulses strong  ABDOMEN: soft, nontender, no hepatosplenomegaly, no masses and bowel sounds normal  MS: no gross musculoskeletal defects noted, no edema    ASA Classification: (II)  Mild systemic disease  Airway Exam: Mallampati Score: " Class II (Complete visualization of uvula)    Plan: Appropriate to proceed as scheduled.      Tim Mcgregor MD  8/9/2023    PCP:  Savanah Richard

## 2023-08-14 LAB
PATH REPORT.COMMENTS IMP SPEC: NORMAL
PATH REPORT.FINAL DX SPEC: NORMAL
PATH REPORT.GROSS SPEC: NORMAL
PATH REPORT.MICROSCOPIC SPEC OTHER STN: NORMAL
PATH REPORT.MICROSCOPIC SPEC OTHER STN: NORMAL
PATH REPORT.RELEVANT HX SPEC: NORMAL
PHOTO IMAGE: NORMAL

## 2023-08-16 ENCOUNTER — TELEPHONE (OUTPATIENT)
Dept: GASTROENTEROLOGY | Facility: CLINIC | Age: 28
End: 2023-08-16
Payer: COMMERCIAL

## 2023-08-16 DIAGNOSIS — K51.00 ULCERATIVE PANCOLITIS WITHOUT COMPLICATION (H): Primary | ICD-10-CM

## 2023-08-16 NOTE — TELEPHONE ENCOUNTER
----- Message from Tim Mcgregor MD sent at 8/16/2023 11:25 AM CDT -----  Regarding: help set up repeat flex sig in 4 months  Help set up repeat flex sig in 4 months    Thanks!    J

## 2023-08-16 NOTE — TELEPHONE ENCOUNTER
"Endoscopy Scheduling Screen    Have you had a positive Covid test in the last 14 days?  No    Are you active on MyChart?   Yes    What insurance is in the chart?  BC/BS: Schedule in ASC unless patient meets exclusion criteria.     Ordering/Referring Provider:   IKER KENNEDY        (If ordering provider performs procedure, schedule with ordering provider unless otherwise instructed. )    BMI: Estimated body mass index is 24.39 kg/m  as calculated from the following:    Height as of 8/9/23: 1.778 m (5' 10\").    Weight as of 8/9/23: 77.1 kg (170 lb).     Sedation Ordered  moderate sedation.   If patient BMI > 50 do not schedule in ASC.    Are you taking any prescription medications for pain?   No    Are you taking methadone or Suboxone?  No    Do you have a history of malignant hyperthermia or adverse reaction to anesthesia?  No    (Females) Are you currently pregnant?   No     Have you been diagnosed or told you have pulmonary hypertension?   No    Do you have an LVAD?  No    Have you been told you have moderate to severe sleep apnea?  No    Have you been told you have COPD, asthma, or any other lung disease?  No    Do you have any heart conditions?  No     Have you ever had or are you awaiting a heart or lung transplant?   No    Have you had a stroke or transient ischemic attack (TIA aka \"mini stroke\" in the last 6 months?   No    Have you been diagnosed with or been told you have cirrhosis of the liver?   No    Are you currently on dialysis?   No    Do you need assistance transferring?   No    BMI: Estimated body mass index is 24.39 kg/m  as calculated from the following:    Height as of 8/9/23: 1.778 m (5' 10\").    Weight as of 8/9/23: 77.1 kg (170 lb).     Is patients BMI > 40 and scheduling location [facility]?2  No    Do you take the medication Phentermine, Ozempic or Wegovy?  No    Do you take the medication Naltrexone?  No    Do you take blood thinners?  No      Prep   Are you currently on dialysis or do " you have chronic kidney disease?  No    Do you have a diagnosis of diabetes?  No    Do you have a diagnosis of cystic fibrosis (CF)?  No    On a regular basis do you go 3 -5 days between bowel movements?  No    BMI > 40?  No    Preferred Pharmacy:    [id number] - [place], [place] - [address] [place] [address]  [address] [place] [address]  Phone: [phone] Fax: [phone]    [id number] - [place], [place] - [address]  [address] [facility] [address]  Phone: [phone] Fax: [phone]      Final Scheduling Details   Colonoscopy prep sent?      Procedure scheduled  Flexible sigmoidoscopy    Surgeon:  Balbir     Date of procedure:  11/20     Schedule PAC:   No    Location  [facility] - [facility]    Sedation   Moderate Sedation    Patient Reminders:   You will receive a call from a Nurse to review instructions and health history.  This assessment must be completed prior to your procedure.  Failure to complete the Nurse assessment may result in the procedure being cancelled.      On the day of your procedure, please designate an adult(s) who can drive you home stay with you for the next 24 hours. The medicines used in the exam will make you sleepy. You will not be able to drive.      You cannot take public transportation, ride share services, or non-medical taxi service without a responsible caregiver.  Medical transport services are allowed with the requirement that a responsible caregiver will receive you at your destination.  We require that drivers and caregivers are confirmed prior to your procedure.

## 2023-08-16 NOTE — TELEPHONE ENCOUNTER
Adult  procedure order placed with instructions to schedule in 4 months. Inbasket message sent to endoscopy scheduling team requesting assistance with scheduling.

## 2023-08-17 NOTE — TELEPHONE ENCOUNTER
"Dr. Mcgregor,  Per pathology result note dated 8/9/23 \"current medication regimen abd repeat a flex sig in 3-4 months.\"    Pt is currently taking Rinvoq 30 mg daily. Do we need to appeal for the 45 mg dose?    Thank you.  Nichole"

## 2023-08-21 ENCOUNTER — TELEPHONE (OUTPATIENT)
Dept: GASTROENTEROLOGY | Facility: CLINIC | Age: 28
End: 2023-08-21
Payer: COMMERCIAL

## 2023-08-21 NOTE — TELEPHONE ENCOUNTER
Tim Mcgregor MD  You4 days ago     JH  Can we appeal?    I think if we could get 45 mg that may be good

## 2023-08-21 NOTE — TELEPHONE ENCOUNTER
PA Initiation    Medication: RINVOQ 45 MG PO TB24  Insurance Company: [employer] - Phone [phone] Fax [phone]  Pharmacy Filling the Rx:    Filling Pharmacy Phone:    Filling Pharmacy Fax:    Start Date: 8/21/2023  [id number]

## 2023-08-21 NOTE — LETTER
2023    To:   [facility]  [address]  [facility] [address]  Fax [phone]    RE:   Poonam Berg  [address] [facility] [address]  : 1995  Policy #: [id number]  Case/Reference: [id number]    To Whom It May Concern,    Below is the clinical summary pertinent to the appeal of additional dosing of rinvoq 45 mg take one tablet daily. We understand that you are denying our request because more than 56 doses is over the FDA labeled dosing.    Background   Diagnosis: Ulcerative pancolitis without complication (H) [K51.00]   Current IBD medications:    -- Prednisone 40mg daily (May, 2023)              -- Upadacitinib (23)    Previous IBD Therapies:   - Canasa   - Infliximab 5mg/kg (400mg) q6   - Azathioprine 150mg (2018 - Summer 2022) - de-escalated due to    - Adalimumab weekly (started 2018, dose escalated 2019)   - Vedolizumab: 2022 - May 2023 - loss of respose q8 dosing    Clinical disease assessment on current medications:   Clearly she has severe ulcerative colitis at this time that is steroid refractory on vedolizumab every 8 weeks.  It would not be unreasonable to have her hospitalized for IV steroids at this time.  However given that we have been able to urgently start upadacitinib, we will hold off on hospitalization and continue to manage her as an outpatient.       It is unclear if she ever responded to vedolizumab.  Her C. difficile toxin in December was discordant, PCR positive but toxin A/B negative.  She had just started vedolizumab within 4 weeks.  While we presumptively treated for C. difficile, it is possible that was just a marker of her severe colitis.     She then remained on p.o. vancomycin throughout the rest of her vedolizumab course.  Flexible sigmoidoscopy which showed a near normal colon was on vedolizumab plus p.o. vancomycin.  Therefore is possible she was responding to vancomycin and not the  vedolizumab.  After stopping the vancomycin undergoing FMT she had a severe colitis flare.  We discussed it was certainly possible that the FMT precipitated this flare.  Interestingly PCR testing of her stool about a month after FMT demonstrated E. coli.     I suspect that her pathogen flickering represents the severity of her colitis.  It may be that she has a colitis that is driven by dysbiosis and therefore she may respond best to oral antibiotics.     We discussed the importance of minimizing prednisone at this time.  She has started upadacitinib today and we will see how she responds over the next 1 to 4 weeks.  If she is not making progress over that time I will restart her on p.o. vancomycin.      Lastly we discussed the importance of surgery.  She has met with colorectal surgery at this time.  We will continue to hope that she has medical response of colitis but if not then we will pursue surgery.     -- Continue upadacitinib induction: I expect she will need prolonged induction for 16 weeks  -- Taper prednisone by 5 mg every 5 to 7 days  -- Okay to use rectal steroid foam or Bentyl as needed for urgency  -- If not getting better over the next few weeks plan to reinitiate p.o. vancomycin as she may have vancomycin sensitive colitis  -- Weekly check-ins with our pharmacy and/or nursing staff to assess her symptoms formally     We did discuss upadacitinib in pregnancy.  If she responds tofacitinib it is a contraindication to be used in pregnancy.  It is possible we could transition her to Ustekinumab.  However if she is not in deep remission on upadacitinib then we will need to consider colectomy prior to pregnancy as the safest option.    Objective measures of inflammation:    - Flex-sig/colonoscopy: - The examined portion of the ileum was normal.                          - Severe (Rocha Score 3) ulcerative colitis. Biopsied.                          - Moderately active (Rocha Score 2) ulcerative colitis.                           Biopsied.  on date 05/31/2023    -   CRP Inflammation   Date Value Ref Range Status   11/15/2019 23.5 (H) 0.0 - 8.0 mg/L Final       - Fecal calprotectin: 5210 on date 11/16/2019     Rationale for requested therapy  The safety and efficacy of 16-week extended induction with upadacitinib was demonstrated in the open-label extension of the U-ACHIEVE Induction and U-ACCOMPLISH pivotal trials. In this protocol, patients with UC who did not respond to an initial 8-week induction therapy may subsequently achieve a clinical response following extended 16-week induction therapy with upadacitinib 45 mg once daily.    Among the 664 patients randomized to receive upadacitinib 45 mg during the induction trials, 125 patients did not achieve a clinical response and received a further 8 weeks of induction therapy with upadacitinib 45 mg. Of these patients, 73 (58.4%) patients achieved a clinical response at week 16.      How we will measure success:   Based on the above, we will measure success defined as an improvement of both symptoms and inflammatory parameters, specifically clinical signs of remission, such as laboratory markers, over 16 weeks timeframe. Endoscopic and histologic signs of remission will be re-evaluated within 6 months. Should we not achieve these measures we will pursue a different line of therapy.    Duration  12 months    Summary  In summary, additional dosing of rinvoq 45 mg take one tablet daily is medically necessary for this patient s medical condition. Please call my office at [phone] if I can provide you with any additional information to approve my request. I look forward to receiving your timely response and approval of this request.     References:  Efficacy and Safety of Upadacitinib in Patients With Moderate to Severe Active Ulcerative Colitis Receiving 16 Weeks' Extended Induction Treatment Followed by 52 Weeks' Maintenance Treatment in the U-ACHIEVE/U-ACCOMPLISH Trials.  Gastroenterol Hepatol (N Y). 2022 Nov;18(11 Suppl 3):6-7. PMID: 68697115; PMCID: ZSO3601900.  Klevin S, Yecenia S, Persaud W, Kelly AL, Kristian J, Anderson S, Kathie X, Montserrat G, Moises H, Bret J, Radu PDR, Pete P, Judith OMKAR, Jostin RASHID Jr, Yevgeniy WJ, Gabbi W, Escamilla MH, Livan Graham Y, Antoni B, Chelsi W, Quinn J, Sy AZEVEDO, Aaron R. Upadacitinib as induction and maintenance therapy for moderately to severely active ulcerative colitis: results from three phase 3, multicentre, double-blind, randomised trials. Lancet. 2022 Jun 4;399(83966):6152-9774. doi: 10.1016/-27342200913-9. Epub 2022 May 26. Erratum in: Lancet. 2022 Sep 24;400(85486):996. PMID: 51595139.    Sincerely,      Camilo Rodriguez PA-C  Division of Gastroenterology, Hepatology and [facility]  [facility]

## 2023-08-28 NOTE — TELEPHONE ENCOUNTER
Prior Authorization Approval    Medication: RINVOQ 45 MG PO TB24  Authorization Effective Date: 8/28/2023  Authorization Expiration Date: 7/26/2024  Approved Dose/Quantity: 365/365  Reference #: [id number]   Insurance Company: [employer] Clinical Review - Phone [phone] Fax [phone]  Expected CoPay:       CoPay Card Available:      Financial Assistance Needed:    Which Pharmacy is filling the prescription:  [facility]  Pharmacy Notified:    Patient Notified:  yes

## 2023-08-30 NOTE — TELEPHONE ENCOUNTER
I just got a phone call that the qty pa approval was an error and revoked. They are sending me a different form to fill out and submit    PA Initiation    Medication: RINVOQ 45 MG PO TB24  Insurance Company: [employer] - Phone [phone] Fax [phone]  Pharmacy Filling the Rx:    Filling Pharmacy Phone:    Filling Pharmacy Fax:    Start Date: 8/21/2023

## 2023-09-01 NOTE — TELEPHONE ENCOUNTER
** I have started a LMN and routed to Dr Moeller and Judith. Once the letter is complete I will start the appeal **  PRIOR AUTHORIZATION DENIED    Medication: RINVOQ 45 MG PO TB24  Insurance Company: [employer] Clinical Review - Phone [phone] Fax [phone]  Denial Date: 9/1/2023  Denial Rational:      Appeal Information:      Patient Notified:

## 2023-09-11 NOTE — TELEPHONE ENCOUNTER
Medication Appeal Initiation    We have initiated an appeal for the requested medication:  Medication: RINVOQ 45 MG PO TB24  Appeal Start Date:  9/11/2023  Insurance Company: [employer] [place]  Insurance Phone: [phone]  Insurance Fax: [phone]  Comments:  Faxed letter, notes, and denial to [facility] [phone].

## 2023-09-12 ENCOUNTER — MYC MEDICAL ADVICE (OUTPATIENT)
Dept: GASTROENTEROLOGY | Facility: CLINIC | Age: 28
End: 2023-09-12
Payer: COMMERCIAL

## 2023-09-18 ENCOUNTER — PATIENT OUTREACH (OUTPATIENT)
Dept: GASTROENTEROLOGY | Facility: CLINIC | Age: 28
End: 2023-09-18

## 2023-09-18 DIAGNOSIS — K51.00 ULCERATIVE PANCOLITIS WITHOUT COMPLICATION (H): ICD-10-CM

## 2023-09-18 RX ORDER — UPADACITINIB 45 MG/1
1 TABLET, EXTENDED RELEASE ORAL DAILY
Qty: 28 TABLET | Refills: 0 | Status: SHIPPED | OUTPATIENT
Start: 2023-09-18 | End: 2023-10-11

## 2023-09-18 NOTE — TELEPHONE ENCOUNTER
MEDICATION APPEAL APPROVED    Medication: RINVOQ 45 MG PO TB24  Authorization Effective Date: 9/18/2023  Authorization Expiration Date: 7/26/2024  Approved Dose/Quantity: 56 more tabs  Reference #: [id number]   Appeal Insurance Company: [employer]  Expected CoPay:       CoPay Card Available:    Financial Assistance Needed:    Which Pharmacy is filling the prescription:    Patient Notified: yes -- guillermina going to send a rx for 28 more tabs, also she has another flex sig to stage her and we can then ask for an other 4 weeks afterwards

## 2023-09-18 NOTE — TELEPHONE ENCOUNTER
MARCIAL Vasquez,  Received message from [facility] PA Team, he states we were able to get PA approval for 4 additional weeks of Rinvoq 45 mg.    Order new rx for Rinvoq 45 mg for 4 weeks.    Pt has flex sign on 11/20/23.    Thank you.  Nichole

## 2023-09-21 ENCOUNTER — TELEPHONE (OUTPATIENT)
Dept: GASTROENTEROLOGY | Facility: CLINIC | Age: 28
End: 2023-09-21
Payer: COMMERCIAL

## 2023-09-21 NOTE — TELEPHONE ENCOUNTER
Called to remind patient of their upcoming appointment with our GI clinic, on Wednesday, September 27th at 8:00am with Camilo Rodriguez. This appointment is scheduled as a video visit. You will receive a call approximately 30 minutes prior to check you in, you must be in [place] for this visit., if your appointment is virtual (video or telephone) you need to be in [place] for the visit. To reschedule or cancel patient to call [phone].    Jazmin Mercado

## 2023-09-21 NOTE — TELEPHONE ENCOUNTER
[facility] Call Center    Phone Message    May a detailed message be left on voicemail: yes     Reason for Call: Medication Question or concern regarding medication   Prescription Clarification  Name of Medication: Upadacitinib ER (RINVOQ) 45 MG TB24  Prescribing Provider: Camilo Rodriguez PA-C   Pharmacy: [facility]   What on the order needs clarification? They are looking to clarify some information about the Pt's prescription in regards to the dosage change.      Action Taken: Message routed to:  [facility] GI    Travel Screening: Not Applicable

## 2023-09-21 NOTE — TELEPHONE ENCOUNTER
Spoke with Savanah - alex tech, ask to speak with pharmacist regarding rx clarification message below.     Transfer to pharmacist - Attila, to confirm going back to 45 mg. Discuss that this has a PA approval and to keep the 30mg Rinvoq. Pt will go back once done with another 4 weeks of the 45 mn Rinvoq. Agree to plan     [Father] : father

## 2023-09-27 ENCOUNTER — PATIENT OUTREACH (OUTPATIENT)
Dept: GASTROENTEROLOGY | Facility: CLINIC | Age: 28
End: 2023-09-27

## 2023-09-27 ENCOUNTER — VIRTUAL VISIT (OUTPATIENT)
Dept: GASTROENTEROLOGY | Facility: CLINIC | Age: 28
End: 2023-09-27
Attending: INTERNAL MEDICINE
Payer: COMMERCIAL

## 2023-09-27 DIAGNOSIS — K51.018 ULCERATIVE (CHRONIC) PANCOLITIS WITH OTHER COMPLICATION (H): Primary | ICD-10-CM

## 2023-09-27 DIAGNOSIS — K51.00 ULCERATIVE PANCOLITIS (H): ICD-10-CM

## 2023-09-27 PROCEDURE — 99214 OFFICE O/P EST MOD 30 MIN: CPT | Mod: 95 | Performed by: PHYSICIAN ASSISTANT

## 2023-09-27 ASSESSMENT — PAIN SCALES - GENERAL: PAINLEVEL: NO PAIN (0)

## 2023-09-27 NOTE — TELEPHONE ENCOUNTER
Poonam called, pt asking for alternative plans to getting the 45mg Rinvoq if insurance denies her for another round of 4weeks.  RN advice that we will get a PA in a few weeks for another 4week. And at that time if unable to get approval we will ask for a bridge PAP through Rinvoq.

## 2023-09-27 NOTE — PROGRESS NOTES
Virtual Visit Details    Type of service:  Video Visit     Originating Location (pt. Location): Home    Distant Location (provider location):  Off-site  Platform used for Video Visit: [facility]    IBD CLINIC VISIT     ASSESSMENT/PLAN  27 year old reji with Ulcerative pancolitis     1. Ulcerative colitis, pancolitis:   Current UC medications:   -- Upatacitinib 45 mg (6/5/23)  Current clinical disease activity: Rocha score: 11/12 (severe)  Last endoscopic disease activity: eMayo 1-2 on flex sig 8/2023. Psudopolyps in sigmoid and descending. Ulcers appeared to be granulating/healing and not like typical active ulcerations (was done after 2 months of starting Rinvoq). Bx show inactive chronic colitis in descending, but active chroinic colitis with prominent ulceration in the sigmoid and rectum. There was 1 CMV inclusion per HPF, likely incidental..    Overall, she has made great progress with Rinvoq. We have had challenges with insurance approving the 45 mg dosing which is important given her ongoing (but improving) active disease. She has approval to continue it for 4 weeks and we will work to continue this after this time frame. She also had 1 CMV inclusion per high power field. Likely incidental. She is doing well overall. I favor followin her on rinvoq and not treating. If she decompensates we will re-evaluate. Repeat flex sig in 3-4 months. Sooner if she flares. If she flares, see Dr. Moeller's last note, and we may need to restart vancomycin.    Of note, Dr. Moeller did discuss upadacitinib in pregnancy.  If she responds tofacitinib it is a contraindication to be used in pregnancy.  It is possible we could transition her to Ustekinumab.  However if she is not in deep remission on upadacitinib then we will need to consider colectomy prior to pregnancy as the safest option.      Camilo Rodriguez PA-C  Division of Gastroenterology, Hepatology and Nutrition  [facility]     2. Iron deficiency anemia, Celiac  serology negative. Could be slow occult bleeding from pseudopolyps.  At this time expect she will have recurrent iron deficiency anemia given her colitis  -- just completed iron infusions    3. History of C Diff infection: 2018 and 2022.  December 22 was discordant testing.  See discussion above regarding colonization and marker of severe colitis versus infection.  --Avoid FMT in the future given possibility of stimulating ulcerative colitis flare    4. Dysplasia surveillance: Last colonoscopy 6/2019. Start at 8 years after IBD diagnosis.  -- Surveillance colonoscopy in fall 2023 / winter 2024.  We will need to coordinate once this severe flare resolves    Return to clinic in 3 months.    Thank you for this consultation.  It was a pleasure to participate in the care of this patient; please contact us with any further questions.  A total of 40 minutes was spent with this patient on the date of the encounter, 6/5/2023, in the following care activities: Chart review, patient care and documentation.      Camilo Rodriguez PA-C  Division of Gastroenterology, Hepatology and Nutrition  [facility]     IBD HISTORY  Age at diagnosis: 20  Extent of disease: pancolitis  Prior UC surgeries:  Prior IBD Medications:   - Canasa   - Infliximab 5mg/kg (400mg) q6   - Azathioprine 150mg (sepetmebr 2018 - Summer 2022) - de-escalated due to    - Adalimumab weekly (started Nov 2018, dose escalated Nov 2019)   - Vedolizumab: Fall 2022 - May 2023 - loss of respose q8 dosing     DRUG MONITORING  TPMT enzyme activity:      6-TGN/6-MMPN levels:  7/15/20: 6-TGN: 137, 6MMPN 3888     Biologic concentration:  7/3/18: IFX: <1, Ab 30 (normal < 50) - Rocha test (q8 weeks, monotherapy)  11/5/18: IFX: Not detected, antibodies: 14     11/15/19: Adalimumab: 3.2, no antibodies     HPI:   Currently having 2-3 stools per day, formed. Having blood in the stool once every other day. Average 1/2 of the stools with blood, BRB in the stool. No  urgency. No nighttime stools. No tenesmus. No new EIM. She completed iron infusions.    Rocha score:   Stool freq: 0 (baseline stools frequency)  Rectal bleedin (Visible blood less than half of the time)  PGA: 1 (Mild)  Endoscopy: Not done    Remission: <3   Mild disease: 3-5  Moderate disease: 6-10  Severe disease: >10      Extra intestinal manifestations of IBD:  No uveitis/episcleritis  No: aphthous ulcers   No arthritis   No erythema nodosum/pyoderma gangrenosum.     Nutrition goals:  Not limiting anything at this time.    sIBDQ:  IBDQ Score Date IBDQ - Total Score  (Higher score better)   3/14/2023   8:55 AM 62   11/15/2019  11:36 AM 57        ROS:    Constitutional, HEENT, cardiovascular, pulmonary, GI, , musculoskeletal, neuro, skin, endocrine and psych systems are negative, except as otherwise noted.    PHYSICAL EXAMINATION:  Constitutional: aaox3, cooperative, pleasant, not dyspneic/diaphoretic, no acute distress  Vitals reviewed: There were no vitals taken for this visit.  Wt:   Wt Readings from Last 2 Encounters:   23 77.1 kg (170 lb)   23 77.1 kg (170 lb)      Constitutional - general appearance is well and in no acute distress. Body habitus normal  Eyes - No redness or discharge  Respiratory - No cough, unlabored breathing  Musculoskeletal - range of motion intact: Neck and arms  Skin - No discoloration or lesions  Neurological - No tremors, headaches  Psychiatric - No anxiety, alert & oriented    PERTINENT PAST MEDICAL HISTORY:  Past Medical History:   Diagnosis Date    Ulcerative colitis (H)        PREVIOUS SURGERIES:  Past Surgical History:   Procedure Laterality Date    COLONOSCOPY N/A 2023    Procedure: COLONOSCOPY, WITH BIOPSY;  Surgeon: Gabino Krueger MD;  Location: [facility] OR       ALLERGIES:     Allergies   Allergen Reactions    Infliximab Nausea and Other (See Comments)     FACE FLUSHING WITH ITCHY/SCRATCHY THROAT.        PERTINENT MEDICATIONS:    Current Outpatient  Medications:     EPINEPHrine (ANY BX GENERIC EQUIV) 0.3 MG/0.3ML injection 2-pack, Inject 0.3 mLs (0.3 mg) into the muscle as needed for anaphylaxis May repeat one time in 5-15 minutes if response to initial dose is inadequate., Disp: 2 each, Rfl: 0    Upadacitinib ER (RINVOQ) 30 MG TB24, Take 30 mg by mouth daily, Disp: 30 tablet, Rfl: 4    Upadacitinib ER (RINVOQ) 45 MG TB24, Take 1 tablet by mouth daily, Disp: 28 tablet, Rfl: 0    dicyclomine (BENTYL) 10 MG capsule, Take 10 mg by mouth 2 times daily as needed (Patient not taking: Reported on 9/27/2023), Disp: , Rfl:     ondansetron (ZOFRAN) 4 MG tablet, Take 1 tablet (4 mg) by mouth every 6 hours as needed for nausea or vomiting (Patient not taking: Reported on 9/27/2023), Disp: 20 tablet, Rfl: 1    SOCIAL HISTORY:  Social History     Socioeconomic History    Marital status:      Spouse name: Not on file    Number of children: Not on file    Years of education: Not on file    Highest education level: Not on file   Occupational History    Not on file   Tobacco Use    Smoking status: Never    Smokeless tobacco: Never   Vaping Use    Vaping Use: Never used   Substance and Sexual Activity    Alcohol use: Yes     Comment: 1-2 monthly    Drug use: Not Currently    Sexual activity: Not on file   Other Topics Concern    Not on file   Social History Narrative    Not on file     Social Determinants of Health     Financial Resource Strain: Not on file   Food Insecurity: Not on file   Transportation Needs: Not on file   Physical Activity: Not on file   Stress: Not on file   Social Connections: Not on file   Interpersonal Safety: Not on file   Housing Stability: Not on file     FAMILY HISTORY:  No FH of IBD/CRC.  No family history on file.    Past/family/social history reviewed and no changesAnswers submitted by the patient for this visit:  Symptoms you have experienced in the last 30 days (Submitted on 9/27/2023)  General Symptoms: No  Skin Symptoms: No  HENT  Symptoms: No  EYE SYMPTOMS: No  HEART SYMPTOMS: No  LUNG SYMPTOMS: No  INTESTINAL SYMPTOMS: No  URINARY SYMPTOMS: No  GYNECOLOGIC SYMPTOMS: No  BREAST SYMPTOMS: No  SKELETAL SYMPTOMS: No  BLOOD SYMPTOMS: No  NERVOUS SYSTEM SYMPTOMS: No  MENTAL HEALTH SYMPTOMS: No

## 2023-09-27 NOTE — LETTER
9/27/2023         RE: Poonam Berg  [address] [facility] [address]        Dear Colleague,    Thank you for referring your patient, Poonam Berg, to the [facility]OLOG[facility] [place]. Please see a copy of my visit note below.    Virtual Visit Details    Type of service:  Video Visit     Originating Location (pt. Location): Home    Distant Location (provider location):  Off-site  Platform used for Video Visit: [facility]    IBD CLINIC VISIT     ASSESSMENT/PLAN  27 year old felame with Ulcerative pancolitis     1. Ulcerative colitis, pancolitis:   Current UC medications:   -- Upatacitinib 45 mg (6/5/23)  Current clinical disease activity: Rocha score: 11/12 (severe)  Last endoscopic disease activity: eMayo 1-2 on flex sig 8/2023. Psudopolyps in sigmoid and descending. Ulcers appeared to be granulating/healing and not like typical active ulcerations (was done after 2 months of starting Rinvoq). Bx show inactive chronic colitis in descending, but active chroinic colitis with prominent ulceration in the sigmoid and rectum. There was 1 CMV inclusion per HPF, likely incidental..    Overall, she has made great progress with Rinvoq. We have had challenges with insurance approving the 45 mg dosing which is important given her ongoing (but improving) active disease. She has approval to continue it for 4 weeks and we will work to continue this after this time frame. She also had 1 CMV inclusion per high power field. Likely incidental. She is doing well overall. I favor followin her on rinvoq and not treating. If she decompensates we will re-evaluate. Repeat flex sig in 3-4 months. Sooner if she flares. If she flares, see Dr. Moeller's last note, and we may need to restart vancomycin.    Of note, Dr. Moeller did discuss upadacitinib in pregnancy.  If she responds tofacitinib it is a contraindication to be used in pregnancy.  It is possible we could transition her to Ustekinumab.  However if she is not in  deep remission on upadacitinib then we will need to consider colectomy prior to pregnancy as the safest option.      Camilo Rodriguez PA-C  Division of Gastroenterology, Hepatology and Nutrition  [facility]     2. Iron deficiency anemia, Celiac serology negative. Could be slow occult bleeding from pseudopolyps.  At this time expect she will have recurrent iron deficiency anemia given her colitis  -- just completed iron infusions    3. History of C Diff infection: 2018 and 2022.  December 22 was discordant testing.  See discussion above regarding colonization and marker of severe colitis versus infection.  --Avoid FMT in the future given possibility of stimulating ulcerative colitis flare    4. Dysplasia surveillance: Last colonoscopy 6/2019. Start at 8 years after IBD diagnosis.  -- Surveillance colonoscopy in fall 2023 / winter 2024.  We will need to coordinate once this severe flare resolves    Return to clinic in 3 months.    Thank you for this consultation.  It was a pleasure to participate in the care of this patient; please contact us with any further questions.  A total of 40 minutes was spent with this patient on the date of the encounter, 6/5/2023, in the following care activities: Chart review, patient care and documentation.      Camilo Rodriguez PA-C  Division of Gastroenterology, Hepatology and Nutrition  [facility]     IBD HISTORY  Age at diagnosis: 20  Extent of disease: pancolitis  Prior UC surgeries:  Prior IBD Medications:   - Canasa   - Infliximab 5mg/kg (400mg) q6   - Azathioprine 150mg (sepetmebr 2018 - Summer 2022) - de-escalated due to    - Adalimumab weekly (started Nov 2018, dose escalated Nov 2019)   - Vedolizumab: Fall 2022 - May 2023 - loss of respose q8 dosing     DRUG MONITORING  TPMT enzyme activity:      6-TGN/6-MMPN levels:  7/15/20: 6-TGN: 137, 6MMPN 3888     Biologic concentration:  7/3/18: IFX: <1, Ab 30 (normal < 50) - Rocha test (q8 weeks,  monotherapy)  18: IFX: Not detected, antibodies: 14     11/15/19: Adalimumab: 3.2, no antibodies     HPI:   Currently having 2-3 stools per day, formed. Having blood in the stool once every other day. Average 1/2 of the stools with blood, BRB in the stool. No urgency. No nighttime stools. No tenesmus. No new EIM. She completed iron infusions.    Rocha score:   Stool freq: 0 (baseline stools frequency)  Rectal bleedin (Visible blood less than half of the time)  PGA: 1 (Mild)  Endoscopy: Not done    Remission: <3   Mild disease: 3-5  Moderate disease: 6-10  Severe disease: >10      Extra intestinal manifestations of IBD:  No uveitis/episcleritis  No: aphthous ulcers   No arthritis   No erythema nodosum/pyoderma gangrenosum.     Nutrition goals:  Not limiting anything at this time.    sIBDQ:  IBDQ Score Date IBDQ - Total Score  (Higher score better)   3/14/2023   8:55 AM 62   11/15/2019  11:36 AM 57        ROS:    Constitutional, HEENT, cardiovascular, pulmonary, GI, , musculoskeletal, neuro, skin, endocrine and psych systems are negative, except as otherwise noted.    PHYSICAL EXAMINATION:  Constitutional: aaox3, cooperative, pleasant, not dyspneic/diaphoretic, no acute distress  Vitals reviewed: There were no vitals taken for this visit.  Wt:   Wt Readings from Last 2 Encounters:   23 77.1 kg (170 lb)   23 77.1 kg (170 lb)      Constitutional - general appearance is well and in no acute distress. Body habitus normal  Eyes - No redness or discharge  Respiratory - No cough, unlabored breathing  Musculoskeletal - range of motion intact: Neck and arms  Skin - No discoloration or lesions  Neurological - No tremors, headaches  Psychiatric - No anxiety, alert & oriented    PERTINENT PAST MEDICAL HISTORY:  Past Medical History:   Diagnosis Date    Ulcerative colitis (H)        PREVIOUS SURGERIES:  Past Surgical History:   Procedure Laterality Date    COLONOSCOPY N/A 2023    Procedure: COLONOSCOPY,  WITH BIOPSY;  Surgeon: Gabino Krueger MD;  Location: [facility] OR       ALLERGIES:     Allergies   Allergen Reactions    Infliximab Nausea and Other (See Comments)     FACE FLUSHING WITH ITCHY/SCRATCHY THROAT.        PERTINENT MEDICATIONS:    Current Outpatient Medications:     EPINEPHrine (ANY BX GENERIC EQUIV) 0.3 MG/0.3ML injection 2-pack, Inject 0.3 mLs (0.3 mg) into the muscle as needed for anaphylaxis May repeat one time in 5-15 minutes if response to initial dose is inadequate., Disp: 2 each, Rfl: 0    Upadacitinib ER (RINVOQ) 30 MG TB24, Take 30 mg by mouth daily, Disp: 30 tablet, Rfl: 4    Upadacitinib ER (RINVOQ) 45 MG TB24, Take 1 tablet by mouth daily, Disp: 28 tablet, Rfl: 0    dicyclomine (BENTYL) 10 MG capsule, Take 10 mg by mouth 2 times daily as needed (Patient not taking: Reported on 9/27/2023), Disp: , Rfl:     ondansetron (ZOFRAN) 4 MG tablet, Take 1 tablet (4 mg) by mouth every 6 hours as needed for nausea or vomiting (Patient not taking: Reported on 9/27/2023), Disp: 20 tablet, Rfl: 1    SOCIAL HISTORY:  Social History     Socioeconomic History    Marital status:      Spouse name: Not on file    Number of children: Not on file    Years of education: Not on file    Highest education level: Not on file   Occupational History    Not on file   Tobacco Use    Smoking status: Never    Smokeless tobacco: Never   Vaping Use    Vaping Use: Never used   Substance and Sexual Activity    Alcohol use: Yes     Comment: 1-2 monthly    Drug use: Not Currently    Sexual activity: Not on file   Other Topics Concern    Not on file   Social History Narrative    Not on file     Social Determinants of Health     Financial Resource Strain: Not on file   Food Insecurity: Not on file   Transportation Needs: Not on file   Physical Activity: Not on file   Stress: Not on file   Social Connections: Not on file   Interpersonal Safety: Not on file   Housing Stability: Not on file     FAMILY HISTORY:  No FH of  IBD/CRC.  No family history on file.    Past/family/social history reviewed and no changesAnswers submitted by the patient for this visit:  Symptoms you have experienced in the last 30 days (Submitted on 9/27/2023)  General Symptoms: No  Skin Symptoms: No  HENT Symptoms: No  EYE SYMPTOMS: No  HEART SYMPTOMS: No  LUNG SYMPTOMS: No  INTESTINAL SYMPTOMS: No  URINARY SYMPTOMS: No  GYNECOLOGIC SYMPTOMS: No  BREAST SYMPTOMS: No  SKELETAL SYMPTOMS: No  BLOOD SYMPTOMS: No  NERVOUS SYSTEM SYMPTOMS: No  MENTAL HEALTH SYMPTOMS: No          Again, thank you for allowing me to participate in the care of your patient.      Sincerely,    Camilo Rodriguez PA-C

## 2023-09-27 NOTE — NURSING NOTE
Is the patient currently in the state of [place]? YES    Visit mode:VIDEO    If the visit is dropped, the patient can be reconnected by: VIDEO VISIT: Text to cell phone:   Telephone Information:   Mobile [phone]       Will anyone else be joining the visit? NO  (If patient encounters technical issues they should call [phone]    How would you like to obtain your AVS? MyChart    Are changes needed to the allergy or medication list? No    Reason for visit: No chief complaint on file.    Marilynn ROCKWELL

## 2023-09-27 NOTE — PATIENT INSTRUCTIONS
It was a pleasure taking care of you today.  I've included a brief summary of our discussion and care plan from today's visit below.  Please review this information with your primary care provider.  ______________________________________________________________________    My recommendations are summarized as follows:    -- Continue rinvoq 45 mg daily   -- Labs every 3 months  -- Next endoscopic assessment: Nov  -- Patient with IBD we recommend supplementation vitamin D 1000 units daily and calcium 500 mg twice daily.  -- Vaccines/immunizations to be updated: flu and updated covid shot  -- No NSAIDs (ibuprofen, or anything containing ibuprofen)    For additional resources about inflammatory bowel disease visit http://www.crohnscolitisfoundation.org/    To learn more about Diet and Nutrition in the setting of IBD, check out some of these resources:  https://www.crohnscolitisfoundation.org/diet-and-nutrition/what-should-i-eat  https://www.nimbal.org/  https://ntforibd.org/      Return to GI Clinic in 3 months to review your progress.    ______________________________________________________________________    How do I schedule labs, imaging studies, or procedures that were ordered in clinic today?     Labs: To schedule lab appointment at the [facility] and [facility], use my chart or call [phone]. If you have a [facility] lab closer to home where you are regularly seen you can give them a call.     Procedures: If a colonoscopy, upper endoscopy, breath test, esophageal manometry, or pH impedence was ordered today, our endoscopy team will call you to schedule this. If you have not heard from our endoscopy team within a week, please call [phone] to schedule.     Imaging Studies: If you were scheduled for a CT scan, X-ray, MRI, ultrasound, HIDA scan or other imaging study, please call [phone] to have this scheduled.     Referral: If a referral to another specialty was ordered, expect a phone call or follow  instructions above. If you have not heard from anyone regarding your referral in a week, please call our clinic to check the status.     Who do I call with any questions after my visit?  Please be in touch if there are any further questions that arise following today's visit.  There are multiple ways to contact your gastroenterology care team.      During business hours, you may reach a Gastroenterology nurse at [phone]    To schedule or reschedule an appointment, please call [phone].     You can always send a secure message through [employer].  [employer] messages are answered by your nurse or doctor typically within 24 hours.  Please allow extra time on weekends and holidays.      For urgent/emergent questions after business hours, you may reach the on-call GI Fellow by contacting the [facility]  at [phone].     How will I get the results of any tests ordered?    You will receive all of your results.  If you have signed up for [employer], any tests ordered at your visit will be available to you after your physician reviews them.  Typically this takes 1-2 weeks.  If there are urgent results that require a change in your care plan, your physician or nurse will call you to discuss the next steps.      What is [employer]?  [employer] is a secure way for you to access all of your healthcare records from the [facility].  It is a web based computer program, so you can sign on to it from any location.  It also allows you to send secure messages to your care team.  I recommend signing up for [employer] access if you have not already done so and are comfortable with using a computer.         Sincerely,    Camilo Rodriguez PA-C  [facility]  Division of Gastroenterology

## 2023-10-02 NOTE — TELEPHONE ENCOUNTER
PRAFUL Rodriguez  Pt was approved for another 4 weeks of Rinvoq at 45mg dose.    Per plan by Dr. Moeller, he wanted a total 8 week additional Rinvoq at 45 mg.    Please advice if we do another PA for the addition 4 weeks.    Thank you.  Nichole

## 2023-10-05 ENCOUNTER — TELEPHONE (OUTPATIENT)
Dept: GASTROENTEROLOGY | Facility: CLINIC | Age: 28
End: 2023-10-05
Payer: COMMERCIAL

## 2023-10-05 DIAGNOSIS — K51.00 ULCERATIVE PANCOLITIS WITHOUT COMPLICATION (H): ICD-10-CM

## 2023-10-05 NOTE — TELEPHONE ENCOUNTER
TAWNYM and sent mychart x1    Schedule:  3 mo follow-up appointment with Camilo Rodriguez (around Dec 2023). RTN IBD, in person or virtual.

## 2023-10-05 NOTE — PROGRESS NOTES
October 5, 2023    Called Poonam Left message and contact info to return call regarding: standing labs

## 2023-10-05 NOTE — TELEPHONE ENCOUNTER
PA Initiation    Medication: RINVOQ 45 MG PO TB24  Insurance Company: [employer] [place] - Phone [phone] Fax [phone]  Pharmacy Filling the Rx:    Filling Pharmacy Phone:    Filling Pharmacy Fax:    Start Date: 10/5/2023

## 2023-10-05 NOTE — PROGRESS NOTES
Tim Mcgregor MD [place], Lindsay Lundell, [facility]; Jo Elizalde RN; Camilo Rodriguez PA-CYesterday (10:29 AM)     JH  Ok all.    Let's keep her going at 45 mg daily. Check CBC, LFTs, BMP, ESR, CRP monthly and cholesterol every 3 months until repeat flex sig in 4 months    Tim

## 2023-10-06 NOTE — TELEPHONE ENCOUNTER
Prior Authorization Not Needed per Insurance    Medication: RINVOQ 45 MG PO TB24  Insurance Company: [employer] [place] - Phone [phone] Fax [phone]  Expected CoPay: $    [facility]  Pharmacy Notified:    Patient Notified:     Per insurance they are covering 365/365

## 2023-10-08 ENCOUNTER — HEALTH MAINTENANCE LETTER (OUTPATIENT)
Age: 28
End: 2023-10-08

## 2023-10-09 NOTE — TELEPHONE ENCOUNTER
RINVOQ 45MG ER TABLET       Last Written Prescription Date:  9-18-23  Last Fill Quantity: 28,   # refills: 0  Last Office Visit : 9-27-23  Future Office visit:  1-10-24    Last clinic note:  Overall, she has made great progress with Rinvoq. We have had challenges with insurance approving the 45 mg dosing which is important given her ongoing (but improving) active disease. She has approval to continue it for 4 weeks and we will work to continue this after this time frame. She also had 1 CMV inclusion per high power field. Likely incidental. She is doing well overall. I favor followin her on rinvoq and not treating. If she decompensates we will re-evaluate. Repeat flex sig in 3-4 months. Sooner if she flares. If she flares, see Dr. Moeller's last note, and we may need to restart vancomycin.      Routing refill request to provider for review/approval because:  Med not on GI protocol  Last rx limited quantity

## 2023-10-11 RX ORDER — UPADACITINIB 45 MG/1
1 TABLET, EXTENDED RELEASE ORAL DAILY
Qty: 28 TABLET | Refills: 3 | Status: SHIPPED | OUTPATIENT
Start: 2023-10-11 | End: 2024-01-26

## 2023-10-11 NOTE — TELEPHONE ENCOUNTER
Rx Refill Request:   Rinvoq 45 mg PO daily   Last Written Prescription Date: 9/18/23  Last Fill Quantity:  28 tab,   # refills: 0    Future Office visit: 1/10/23  Last Office Visit :  9/27/23  Dx: Ulcerative colitis, pancolitis:   Current UC medications:              -- Upatacitinib 45 mg (6/5/23)    Refills sent to [facility] to cover until [facility].  Sent reminder via portal message to ask for new prescription at [facility].

## 2023-10-30 ENCOUNTER — TELEPHONE (OUTPATIENT)
Dept: GASTROENTEROLOGY | Facility: CLINIC | Age: 28
End: 2023-10-30
Payer: COMMERCIAL

## 2023-10-30 ENCOUNTER — MYC MEDICAL ADVICE (OUTPATIENT)
Dept: GASTROENTEROLOGY | Facility: CLINIC | Age: 28
End: 2023-10-30
Payer: COMMERCIAL

## 2023-10-30 NOTE — CONFIDENTIAL NOTE
Poonam called, pt states she got a letter from [facility] that her DOS 10/3/22 lab was not covered ans she does not understand why and wants to see if we can appeal. Poonam had the lab completed locally ask pt to call local facility to dispute the bill.

## 2023-10-30 NOTE — LETTER
2023    INSURER: Payor: LADONNA / Plan: BCBS OUT OF STATE / Product Type: Indemnity /   Patient: Poonam Berg  Policy ID#:  [id number]  : 1995      To Whom it May Concern:    I am writing to formally request a coverage for checking Ecoli toxi A/B on my patient,  Poonam Berg.  This is in the setting of an acute flare of her inflammatory bowel disease and is necessary to rule out all possible element of infectious in order to best optimize her therapy. This includes Cdiff, Enteric panel and Ova and parasite stool panels.       Sincerely,    Camilo Rodriguez PA-C  Division of Gastroenterology, Hepatology and Nutrition  [facility]

## 2023-10-30 NOTE — CONFIDENTIAL NOTE
Received VM regarding a bill.    October 30, 2023    Called Poonam, Left message and contact info to return call regarding: bill issue      Sent MyC with above request.

## 2023-11-03 NOTE — TELEPHONE ENCOUNTER
Spoke with Poonam, pt states she spoke with [employer] and they denied the lab bill. LADONNA told pt that she can have her provider appeal the decision. She is asking us to appeal.    Rn ask pt to provide the denial letter and will start the appeals. Process.

## 2023-11-06 ENCOUNTER — TELEPHONE (OUTPATIENT)
Dept: GASTROENTEROLOGY | Facility: CLINIC | Age: 28
End: 2023-11-06

## 2023-11-06 NOTE — TELEPHONE ENCOUNTER
Attempted to contact patient in order to complete pre assessment questions.     No answer. Left message to return call to [phone] option 4      Procedure details:    Patient scheduled for Flexible sigmoidoscopy  on 11/20/23.     Arrival time: 1015. Procedure time 1115    Pre op exam needed? N/A    [facility] [address] [place] [address], [address] Floor, [place] [address]    Sedation type: Conscious sedation     Indication for procedure: Ulcerative pancolitis       Chart review:     Electronic implanted devices? No    Recent diagnosis of diverticulitis within the last 6 weeks? No    Diabetic? No    Medication review:    Anticoagulants? No    NSAIDS? No    Other medication HOLDING recommendations:  N/A      Prep for procedure:     Bowel prep recommendation: Enemas  Due to: standard bowel prep.    Prep instructions sent via [employer].     Arabella Mcmullen RN  Endoscopy Procedure Pre Assessment RN

## 2023-11-10 ENCOUNTER — TELEPHONE (OUTPATIENT)
Dept: GASTROENTEROLOGY | Facility: CLINIC | Age: 28
End: 2023-11-10
Payer: COMMERCIAL

## 2023-11-10 NOTE — TELEPHONE ENCOUNTER
PA Initiation    Medication: RINVOQ 45 MG PO TB24  Insurance Company: [employer] [place] - Phone [phone] Fax [phone]  Pharmacy Filling the Rx:    Filling Pharmacy Phone:    Filling Pharmacy Fax:    Start Date: 11/10/2023

## 2023-11-10 NOTE — TELEPHONE ENCOUNTER
Pre assessment completed for upcoming procedure.      Procedure details:    Patient scheduled for Flexible sigmoidoscopy  on 11/20/23.     Arrival time: 1100. Procedure time 1200    Pre op exam needed? N/A    Facility location: [facility]; [address] [place] [address], [address] Floor, [place] [address]    Sedation type: Conscious sedation     Indication for procedure: ulcerative pancolitis    COVID policy reviewed.    Designated  policy reviewed. Instructed to have someone stay 6 hours post procedure.       Chart review:     Electronic implanted devices? No    Recent diagnosis of diverticulitis within the last 6 weeks?  No    Diabetic? No    Medication review:    Anticoagulants? No    NSAIDS? No    Other medication HOLDING recommendations:  N/A      Prep for procedure:     Bowel prep recommendation: Enemas  Due to: standard bowel prep.    Prep instructions sent via [employer]     Reviewed procedure prep instructions.     Patient verbalized understanding and had no questions or concerns at this time.        Mena Mane RN  Endoscopy Procedure Pre Assessment RN  [phone] option 4

## 2023-11-13 NOTE — TELEPHONE ENCOUNTER
Prior Authorization Approval    Medication: RINVOQ 45 MG PO TB24  Authorization Effective Date: 11/13/2023  Authorization Expiration Date: 5/23/2024  Approved Dose/Quantity: ud  Reference #:     Insurance Company: [employer] [place] - Phone [phone] Fax [phone]  Expected CoPay: $    CoPay Card Available:      Financial Assistance Needed:    Which Pharmacy is filling the prescription: [id number] - [place], [place] - [address] [place] [address]  Pharmacy Notified:    Patient Notified:

## 2023-11-20 ENCOUNTER — HOSPITAL ENCOUNTER (OUTPATIENT)
Facility: AMBULATORY SURGERY CENTER | Age: 28
Discharge: HOME | End: 2023-11-20
Attending: INTERNAL MEDICINE | Admitting: INTERNAL MEDICINE
Payer: COMMERCIAL

## 2023-11-20 VITALS
OXYGEN SATURATION: 99 % | TEMPERATURE: 97.3 F | SYSTOLIC BLOOD PRESSURE: 105 MMHG | DIASTOLIC BLOOD PRESSURE: 61 MMHG | BODY MASS INDEX: 24.34 KG/M2 | HEART RATE: 63 BPM | RESPIRATION RATE: 14 BRPM | HEIGHT: 70 IN | WEIGHT: 170 LBS

## 2023-11-20 LAB
FLEXIBLE SIGMOIDOSCOPY: NORMAL
HCG UR QL: NEGATIVE
INTERNAL QC OK POCT: NORMAL
POCT KIT EXPIRATION DATE: NORMAL
POCT KIT LOT NUMBER: NORMAL

## 2023-11-20 PROCEDURE — 45331 SIGMOIDOSCOPY AND BIOPSY: CPT | Performed by: INTERNAL MEDICINE

## 2023-11-20 PROCEDURE — 88305 TISSUE EXAM BY PATHOLOGIST: CPT | Mod: 26 | Performed by: PATHOLOGY

## 2023-11-20 PROCEDURE — 81025 URINE PREGNANCY TEST: CPT | Performed by: PATHOLOGY

## 2023-11-20 PROCEDURE — 88305 TISSUE EXAM BY PATHOLOGIST: CPT | Mod: TC | Performed by: INTERNAL MEDICINE

## 2023-11-20 RX ORDER — PROCHLORPERAZINE MALEATE 10 MG
10 TABLET ORAL EVERY 6 HOURS PRN
Status: DISCONTINUED | OUTPATIENT
Start: 2023-11-20 | End: 2023-11-21 | Stop reason: HOSPADM

## 2023-11-20 RX ORDER — ONDANSETRON 4 MG/1
4 TABLET, ORALLY DISINTEGRATING ORAL EVERY 6 HOURS PRN
Status: DISCONTINUED | OUTPATIENT
Start: 2023-11-20 | End: 2023-11-21 | Stop reason: HOSPADM

## 2023-11-20 RX ORDER — FENTANYL CITRATE 50 UG/ML
INJECTION, SOLUTION INTRAMUSCULAR; INTRAVENOUS PRN
Status: DISCONTINUED | OUTPATIENT
Start: 2023-11-20 | End: 2023-11-20 | Stop reason: HOSPADM

## 2023-11-20 RX ORDER — ONDANSETRON 2 MG/ML
4 INJECTION INTRAMUSCULAR; INTRAVENOUS
Status: COMPLETED | OUTPATIENT
Start: 2023-11-20 | End: 2023-11-20

## 2023-11-20 RX ORDER — SODIUM CHLORIDE, SODIUM LACTATE, POTASSIUM CHLORIDE, CALCIUM CHLORIDE 600; 310; 30; 20 MG/100ML; MG/100ML; MG/100ML; MG/100ML
INJECTION, SOLUTION INTRAVENOUS CONTINUOUS
Status: DISCONTINUED | OUTPATIENT
Start: 2023-11-20 | End: 2023-11-20 | Stop reason: HOSPADM

## 2023-11-20 RX ORDER — NALOXONE HYDROCHLORIDE 0.4 MG/ML
0.4 INJECTION, SOLUTION INTRAMUSCULAR; INTRAVENOUS; SUBCUTANEOUS
Status: DISCONTINUED | OUTPATIENT
Start: 2023-11-20 | End: 2023-11-21 | Stop reason: HOSPADM

## 2023-11-20 RX ORDER — ONDANSETRON 2 MG/ML
4 INJECTION INTRAMUSCULAR; INTRAVENOUS EVERY 6 HOURS PRN
Status: DISCONTINUED | OUTPATIENT
Start: 2023-11-20 | End: 2023-11-21 | Stop reason: HOSPADM

## 2023-11-20 RX ORDER — FLUMAZENIL 0.1 MG/ML
0.2 INJECTION, SOLUTION INTRAVENOUS
Status: DISCONTINUED | OUTPATIENT
Start: 2023-11-20 | End: 2023-11-21 | Stop reason: HOSPADM

## 2023-11-20 RX ORDER — NALOXONE HYDROCHLORIDE 0.4 MG/ML
0.2 INJECTION, SOLUTION INTRAMUSCULAR; INTRAVENOUS; SUBCUTANEOUS
Status: DISCONTINUED | OUTPATIENT
Start: 2023-11-20 | End: 2023-11-21 | Stop reason: HOSPADM

## 2023-11-20 RX ORDER — LIDOCAINE 40 MG/G
CREAM TOPICAL
Status: DISCONTINUED | OUTPATIENT
Start: 2023-11-20 | End: 2023-11-20 | Stop reason: HOSPADM

## 2023-11-20 RX ADMIN — ONDANSETRON 4 MG: 2 INJECTION INTRAMUSCULAR; INTRAVENOUS at 10:41

## 2023-11-20 RX ADMIN — SODIUM CHLORIDE, SODIUM LACTATE, POTASSIUM CHLORIDE, CALCIUM CHLORIDE: 600; 310; 30; 20 INJECTION, SOLUTION INTRAVENOUS at 10:41

## 2023-11-20 NOTE — OR NURSING
Patient only able to complete one enema prior to pre procedure due to nausea. RN relayed info to MD, who said to have her try an additional enema prior to procedure if she is able to, otherwise one is fine. Patient refused second enema.

## 2023-11-20 NOTE — TELEPHONE ENCOUNTER
Hello Team,  Please be on the look out for denial appeal from BCBS for this pt.    Thank you.  Nichole

## 2023-11-20 NOTE — H&P
Poonam LONGORIA [facility]  [id number]  female  28 year old      Reason for procedure/surgery: follow up ulcerative colitis    Patient Active Problem List   Diagnosis    Ulcerative pancolitis (H)    Ulcerative pancolitis without complication (H)    Nausea and vomiting    Diarrhea    Iron deficiency       Past Surgical History:    Past Surgical History:   Procedure Laterality Date    COLONOSCOPY N/A 5/31/2023    Procedure: COLONOSCOPY, WITH BIOPSY;  Surgeon: Gabino Krueger MD;  Location: [facility] OR       Past Medical History:   Past Medical History:   Diagnosis Date    Ulcerative colitis (H)        Social History:   Social History     Tobacco Use    Smoking status: Never    Smokeless tobacco: Never   Substance Use Topics    Alcohol use: Not Currently     Comment: 1-2 monthly       Family History: History reviewed. No pertinent family history.    Allergies:   Allergies   Allergen Reactions    Infliximab Nausea and Other (See Comments)     FACE FLUSHING WITH ITCHY/SCRATCHY THROAT.        Active Medications:   Current Outpatient Medications   Medication Sig Dispense Refill    RINVOQ 45 MG TB24 TAKE 1 TABLET BY MOUTH DAILY 28 tablet 3    dicyclomine (BENTYL) 10 MG capsule Take 10 mg by mouth 2 times daily as needed (Patient not taking: Reported on 9/27/2023)      EPINEPHrine (ANY BX GENERIC EQUIV) 0.3 MG/0.3ML injection 2-pack Inject 0.3 mLs (0.3 mg) into the muscle as needed for anaphylaxis May repeat one time in 5-15 minutes if response to initial dose is inadequate. 2 each 0    ondansetron (ZOFRAN) 4 MG tablet Take 1 tablet (4 mg) by mouth every 6 hours as needed for nausea or vomiting (Patient not taking: Reported on 9/27/2023) 20 tablet 1    Upadacitinib ER (RINVOQ) 30 MG TB24 Take 30 mg by mouth daily 30 tablet 4       Systemic Review:   CONSTITUTIONAL: NEGATIVE for fever, chills, change in weight  ENT/MOUTH: NEGATIVE for ear, mouth and throat problems  RESP: NEGATIVE for significant cough or SOB  CV: NEGATIVE for chest pain,  "palpitations or peripheral edema    Physical Examination:   Vital Signs: /76   Pulse 71   Temp 97  F (36.1  C) (Temporal)   Resp 16   Ht 1.778 m (5' 10\")   Wt 77.1 kg (170 lb)   LMP 10/27/2023 (Approximate)   SpO2 99%   BMI 24.39 kg/m    GENERAL: healthy, alert and no distress  NECK: no adenopathy, no asymmetry, masses, or scars  RESP: lungs clear to auscultation - no rales, rhonchi or wheezes  CV: regular rate and rhythm, normal S1 S2, no S3 or S4, no murmur, click or rub, no peripheral edema and peripheral pulses strong  ABDOMEN: soft, nontender, no hepatosplenomegaly, no masses and bowel sounds normal  MS: no gross musculoskeletal defects noted, no edema    ASA Classification: (II)  Mild systemic disease  Airway Exam: Mallampati Score: Class II (Complete visualization of uvula)    Plan: Appropriate to proceed as scheduled.      Tim Mcgregor MD  11/20/2023    PCP:  Savanah Richard    "

## 2023-11-20 NOTE — TELEPHONE ENCOUNTER
Called Poonam, conference call [facility] out of [place] and spoke with Fifi - [facility] Rep regarding bill for DOS 5/16/23 $1534. Fifi ask us to call [facility] at [phone] since they handle the appeals. The reason for not paying is that both enteric panel and e- coli is done on same day and their policy is to not pay for same test.    Claim contact#[phone] #2    Spoke with Samra SARGENT at [facility] Claims Rep ref#[id number], appeal on 11/7/23 denied.  Appeal #[id number]. Need 2nd level of appeal.    Why test was done and the test order due to acute symptoms.

## 2023-11-20 NOTE — TELEPHONE ENCOUNTER
"PRAFUL Rodriguez,  Pt's insuance company denied the 1st level of appeal for e. Coli toxin a/b lab test.    The insurance company states that they paid for the enteric panel and since that covers the e. Coli as well they will not pay for the e. Coli toxin a/b lab test.    Pt would like us to appeal. Next step is the 2nd level of appeal.    Please provide a LMN to answer \"Why test was done and the test was ordered due to acute symptoms\"    DOS 5/16/23.    Thank you.  Nichole"

## 2023-11-21 NOTE — TELEPHONE ENCOUNTER
Hello Team,  Please fax appeal letters to [phone] and scan into chart.    Please call [phone] to confirm receipt.    Thank you.  Nichole

## 2023-11-24 NOTE — TELEPHONE ENCOUNTER
Sent MyC message with update of 2nd level appeal letter faxed to [facility].

## 2023-11-30 ENCOUNTER — TELEPHONE (OUTPATIENT)
Dept: GASTROENTEROLOGY | Facility: CLINIC | Age: 28
End: 2023-11-30
Payer: COMMERCIAL

## 2023-11-30 DIAGNOSIS — K51.00 ULCERATIVE PANCOLITIS WITHOUT COMPLICATION (H): Primary | ICD-10-CM

## 2023-11-30 DIAGNOSIS — K51.00 ULCERATIVE PANCOLITIS (H): ICD-10-CM

## 2023-11-30 RX ORDER — MEPERIDINE HYDROCHLORIDE 25 MG/ML
25 INJECTION INTRAMUSCULAR; INTRAVENOUS; SUBCUTANEOUS EVERY 30 MIN PRN
Status: CANCELLED | OUTPATIENT
Start: 2023-11-30

## 2023-11-30 RX ORDER — ALBUTEROL SULFATE 0.83 MG/ML
2.5 SOLUTION RESPIRATORY (INHALATION)
Status: CANCELLED | OUTPATIENT
Start: 2023-11-30

## 2023-11-30 RX ORDER — DIPHENHYDRAMINE HYDROCHLORIDE 50 MG/ML
50 INJECTION INTRAMUSCULAR; INTRAVENOUS
Status: CANCELLED
Start: 2023-11-30

## 2023-11-30 RX ORDER — ALBUTEROL SULFATE 90 UG/1
1-2 AEROSOL, METERED RESPIRATORY (INHALATION)
Status: CANCELLED
Start: 2023-11-30

## 2023-11-30 RX ORDER — METHYLPREDNISOLONE SODIUM SUCCINATE 125 MG/2ML
125 INJECTION, POWDER, LYOPHILIZED, FOR SOLUTION INTRAMUSCULAR; INTRAVENOUS
Status: CANCELLED
Start: 2023-11-30

## 2023-11-30 RX ORDER — EPINEPHRINE 1 MG/ML
0.3 INJECTION, SOLUTION, CONCENTRATE INTRAVENOUS EVERY 5 MIN PRN
Status: CANCELLED | OUTPATIENT
Start: 2023-11-30

## 2023-11-30 NOTE — TELEPHONE ENCOUNTER
----- Message from Camilo Rodriguez PA-C sent at 11/30/2023  8:35 AM CST -----  Regarding: RE: Residual inflammation  Good morning!    I tried to call Poonam this morning to review the plan but she did not answer. In summary, we need to switch therapies. Based on the available meds we have, we are looking at changing her to stelara (or if we can get skyrizi approved that would be my preference though I know not approved for UC yet) plus azathioprine.    She is not having symptoms, but we can also consider rectal therapy too.    I will send her a message with this information.    Nichole do you mind checking in with her to ensure she knows that plan?   Can we get her into see Judith?  Can you also start the PA for skyrizi (see if we can get it approved) and also start Azathioprine 150 mg w/ associated labs for restart?     Thank you so much! Let me know if there are questions.    --Camilo  ----- Message -----  From: Tim Mcgregor MD  Sent: 11/23/2023   6:57 AM CST  To: Camilo Rodriguez PA-C  Subject: RE: Residual inflammation                        Thanks for the great summary.    I think Stelara with azathioprine is our best option at this time. Also, may be a course of budesonide foam if she is willing (may be hard to convince given she is asymptomatic). Rectal tacrolimus could also be considered.    J  ----- Message -----  From: Camilo Rodriguez PA-C  Sent: 11/22/2023   9:10 AM CST  To: Tim Mcgregor MD  Subject: RE: Residual inflammation                        Thanks for the update Tim!    Prior IBD Medications:   - Canasa   - Infliximab 5mg/kg (400mg) q6   - Azathioprine 150mg (sepetmebr 2018 - Summer 2022) - de-escalated due to    - Adalimumab weekly (started Nov 2018, dose escalated Nov 2019)   - Vedolizumab: Fall 2022 - May 2023 - loss of respose q8 dosing    Other meds we could consider maybe:  -- Stelara  -- Ozanimod     I guess my vote would be stelara at this point.      Thoughts? Any others I might be missing?    ----- Message -----  From: Tim Mcgregor MD  Sent: 11/21/2023   5:46 PM CST  To: Camilo Natasha Rodriguez PA-C  Subject: Residual inflammation                            Hi Camilo,    Did the flex on Leah on Monday.    She has no symptoms.    But still has moderate inflammation in the distal 20 cm of colon and a small patch at her splenic flex.    I'm not sure the Rinvoq is cutting it. We could try to give it more time (but I am not sure it will make much difference).     Can you remind me what she has been on previously and we can discuss if there are reasonable other options.    J

## 2023-12-01 NOTE — TELEPHONE ENCOUNTER
Hello Team,  Please obtain PA for Skyrizi.  Rinvoq will be stop prior to the start of Skyrizi.    Thank you.  Nichole

## 2023-12-01 NOTE — TELEPHONE ENCOUNTER
Spoke with Poonam and relayed PA message/recommendations to start Skyrizi and Aza.  Patient acknowledged understanding and agree with plan. Answer all questions offered contact information for pt to call with any questions or concerns.    Poonam states she is arlette with transitioning to Skyrizi and AZA. She tooked it while on Humira. Pt states she does not have insurance right now and would like to start it when she has new insurance in January 2024. Pt would like to continue on Rinvoq for right now and start it once we get approval in Jan 2024.    Pt has MTM appt on 12/8/23 and will discus further.

## 2023-12-01 NOTE — TELEPHONE ENCOUNTER
PA Initiation    Medication: SKYRIZI 360 MG/2.4ML SC SOCT  Insurance Company: [facility] - Phone [phone] Fax [phone]  Pharmacy Filling the Rx:    Filling Pharmacy Phone:    Filling Pharmacy Fax:    Start Date: 12/1/2023  YOGI

## 2023-12-01 NOTE — TELEPHONE ENCOUNTER
Camilo Rodriguez PA-C Hokenson, Montanna T, RN; Judith Barker, [facility]; Javon Chavarria, RP  Yes ok to continue rinvoq until we can start skyrizi  ---------------------------------------  Sent MyC message with above recommendation.

## 2023-12-01 NOTE — TELEPHONE ENCOUNTER
Order MTM to discuss new start of Skyrizi.    Order new therapy plan for skyrizi.    Order standing lab (cbc, crp, esr, lfts and lipid)  Order TB and TPMT.

## 2023-12-08 ENCOUNTER — TELEPHONE (OUTPATIENT)
Dept: GASTROENTEROLOGY | Facility: CLINIC | Age: 28
End: 2023-12-08

## 2023-12-08 ENCOUNTER — VIRTUAL VISIT (OUTPATIENT)
Dept: GASTROENTEROLOGY | Facility: CLINIC | Age: 28
End: 2023-12-08
Attending: PHYSICIAN ASSISTANT
Payer: COMMERCIAL

## 2023-12-08 DIAGNOSIS — Z78.9 TAKES DIETARY SUPPLEMENTS: ICD-10-CM

## 2023-12-08 DIAGNOSIS — K51.00 ULCERATIVE PANCOLITIS (H): Primary | ICD-10-CM

## 2023-12-08 NOTE — PROGRESS NOTES
Medication Therapy Management (MTM) Encounter    ASSESSMENT:                            Medication Adherence/Access: See below for considerations    Ulcerative Colitis:  Poonam would benefit from transitioning to Stelara from Rinvoq in the future, however she currently does not have active insurance. She needs patient assistance to help bridge her with her current Rinvoq supply and I have connected with the pharmacy liaison to help with this. Given Skyrizi is not currently FDA-approved for UC and patient has interest in becoming pregnant in future, I recommend proceeding with Stelara and this was agreed upon with Camilo Rodriguez PA-C. Rinvoq should not be used in combination with azathioprine so we will delay initiating that until she has switched to Stelara. She will be indicated for routine azathioprine monitoring including labs at weeks 2, 4, 8, 12 and 16 followed by every 3 months, with thiopurine metabolite check at week 8 or later to check efficacy, or sooner if indicated.     I recommend treatment with tacrolimus suppository for his current GI symptoms as this may represent a UC flare, and agreement was reached with Camilo Rodriguez PA-C. A prescription was sent to [facility] with this encounter.    She is due for labs including ESR, CRP, BMP, hepatic panel and CBC with platelets and diff which were recommended to be completed monthly, as well as lipid panel which was recommended every 3 months while on extended Rinvoq induction. She is also due for annual tuberculosis screening.    A health maintenance review was not completed with this visit. Reminders for routine cancer screenings was provided.     Supplements: Stable.    PLAN:                            You are due for lab work. This can be completed at any [facility] clinic. Please let us know if you need this faxed elsewhere. We currently recommend that you have labs done monthly while on Rinvoq 45 mg once daily. Once you are on  "Stelara you will be due for labs every 3 months. We also need you to complete an annual tuberculosis screening which has been ordered.  Continue taking Rinvoq 45 mg for now. I will work with the pharmacy liaison to try and get you a \"bridge fill\" through patient assistance program until your new insurance is active.  Please let us know once you have active insurance. After you have active insurance we will secure coverage for Stelara.  We will wait to start Azathioprine until after you are off of the Rinvoq and switch to Stelara. You will be due for additional lab monitoring at Weeks 2, 4, 8, 12, and 16, followed by every 3 months. We can fax these lab orders to [facility].   Reminder to keep your appointment in March with dermatology for routine skin check.  I will let the nurse coordinators know you are interested in injection training as you transition to Stelara.  I have sent a prescription for Tacrolimus 1.5 mg rectal ointment to [facility] to help with your current GI symptoms.    Follow-up: MTM Visit once ready to switch to Stelara/Azathioprine or as needed; health maintenance review at future MTM visit    EDUCATION:     We reviewed Skyrizi/Stelara/Azathioprine today including an overview of the coverage and pharmacy process, mechanism of action, general dosing/administration, side effects (both common/serious), precautions, monitoring for safety and efficacy, as well as time to efficacy. We discussed immunosuppressive precautions including caution with LIVE vaccines unless specifically indicated, and recommendation for indicated non-live/inactivated vaccines. Reviewed potential need to hold medication in the setting of signs/symptoms of infection. Contact information provided in the event they have questions/concerns about the medication.    SUBJECTIVE/OBJECTIVE:                          Poonam Berg is a 28 year old female called for an initial visit. She was referred to " me from Camilo Rodriguez PA-C.  Joined by jimmy Cassy.     Reason for visit: new start Skyrizi plus Aza    Allergies/ADRs: Reviewed in chart  Past Medical History: Reviewed in chart  Tobacco: She reports that she has never smoked. She has never used smokeless tobacco.  Alcohol: Less than 1 beverages / week      Medication Adherence/Access: patient does not currently have insurance coverage, anticipates insurance coverage around December 20th    Ulcerative Colitis:   Rinvoq 45 mg once daily    I saw Poonam today to discuss transition to Skyrizi and azathioprine given ongoing inflammation seen on flex sig, currently on extended induction with Rinvoq 45 mg. Rinvoq was started around 6/5/23. She has been on Rinvoq 45 mg for about 26 weeks. She reports to me that she recently lost insurance coverage and has enough Rinvoq through December 27th. She anticipates the soonest she will have insurance coverage is December 20th. I confirmed she does not have an available supply of Rinvoq 30 mg on hand. I did not ask how long she took the Rinvoq 30 mg dose during our visit and it is unclear based on chart review. Previously recommended by Tim Mcrgegor on 9/27/23 to check CBC, LFTs, BMP, ESR, CRP monthly and cholesterol every 3 months until repeat flex sig. The repeat flex sig was recently completed 11/20/23.     She reports new GI symptoms, including at least once incidence with blood in stool. Given past history of severe UC flares I connected with Camilo Rodriguez PA-C and we agreed to start tacrolimus suppository as Uceris tablets and foam are both cost prohibitive without insurance. Entocort tablets through [facility] is affordable however she reports this was not effective when used in past. Dr. Moeller previously wanted her to avoid prednisone as she was using this a lot in past.    Skyrizi is currently not FDA-approved in ulcerative colitis and thus we are likely to run into insurance issues with getting this approved. Patient  also expresses interest in becoming pregnant in the future and therapeutic drug monitoring for Skyrizi is not readily available. Therefore I recommended proceeding with Samantha and Camilo Rodriguez PA-C agreed.     Azathioprine is not recommended to be used in combination with Rinvoq due to increase risk of side effects. It was discussed with the patient that we would not start this until after she transitions to Stelara. We discussed standard azathioprine monitoring and she is amenable to this however she would prefer the lab orders be sent to [place] [facility] within [facility] in [place] for convenience. She is familiar with azathioprine as she had previously been on this.     Last provider visit: 9/27/23 Camilo Rodriguez PA-C  Next provider visit: 1/10/24 Camilo Rodriguez PA-C  Last labs completed: 8/9/2023  Lab frequency: every 3 months   - standing labs yes, lipids, hepatic, esr, crp, cbc with platelets & diff  Next labs due: Now    IBD Health Care Maintenance:- Not discussed comprehensively given other concerns at today's visit    Vaccinations:  All patients on biologics should avoid live vaccines.    -- Influenza (every year)- not on file  -- TdaP (every 10 years)- last 2021  -- Pneumococcal Pneumonia    Prevnar-13: not on file   Pneumovax-23: 9/7/2022, 4/4/2017   Prevnar-20: not on file  -- COVID-19- 1/10/2022  -- Yearly assessment for latent Tb (verbal screening and exam, PPD or QuantiFERON-Tb testing)      result: not done not done    One time confirmation of immunity or serologies:  -- Hepatitis A (serologies or immunizations)- serology indicates not immune 2016  -- Hepatitis B (serologies or immunizations)  -- Varicella/Zoster    Varicella- 11/4/2010, 10/29/1998   Zoster- not on file  -- MMR- 4/25/2003, 10/29/1998  -- HPV (all aged 18-26)- not on file    Due to the immunosuppression in this patient, I would not advise administration of live vaccines such as varicella/VZV, intranasal  influenza, MMR, or yellow fever vaccine (if traveling).      Pre-Biologic Screening:  -- Hep B Surface Antibody serologies indicate immunity 11/2017  -- Hep B Surface Antigen non-reactive 11/13/2017  -- Hep B Core Antibody non-reactive 12/3/2016  -- Hep C Antibody not on file    Bone mineral density screening   -- Recommend all patients supplement with calcium and vitamin D  -- Last Dexa 2018 normal  Cancer Screening:  Colon cancer screening: Per GI    Cervical cancer screening: Annual due to immunosupression    Skin cancer screening: Annual visual exam of skin by dermatologist since patient is immunocompromised    Depression Screening:- not addressed at this visit  -- Over the last month, have you felt down, depressed, or hopeless?   -- Over the last month, have you felt little interest or pleasure doing things?     Research:- not addressed at this visit  Are you interested in being contacted about enrollment in clinical research studies?     Would you like to receive a quarterly newsletter on research via email.       Misc:  -- Avoid tobacco use  -- Avoid NSAIDs as there is potentially a 25% chance of causing an IBD flare    Supplements:   Multivitamin daily   Supergreens once daily - general health  Herbal cider vinegar once daily - general health    Denies side effects or concerns.    ----------------      I spent 54 minutes with this patient today. All changes were made via collaborative practice agreement with Camilo Rodriguez PA-C. A copy of the visit note was provided to the patient's provider(s).    A summary of these recommendations was sent via [employer].    Janessa BenitesD, BCPS  MTM Pharmacist   [facility]  Phone: [phone]    Telemedicine Visit Details  Type of service:  Telephone visit  Start Time:  9:31 AM  End Time:  10:25 AM     Medication Therapy Recommendations  No medication therapy recommendations to display

## 2023-12-08 NOTE — Clinical Note
12/8/2023         RE: Poonam Berg  [address] [facility] [address]        Dear Colleague,    Thank you for referring your patient, Poonam Berg, to the [facility]. Please see a copy of my visit note below.    Medication Therapy Management (MTM) Encounter    ASSESSMENT:                            Medication Adherence/Access: {adherencechoices:499118}    Ulcerative Colitis:  ***      PLAN:                            Continue Rinvoq for now- What quantity and strength do you have on hand?- Enough Rinvoq through December 27th- kari will discuss with liaison  Kari will clarify with Camilo whether we should wiat until Skyrizi transition to start Azathioprine- yes, no overlap with Rinvoq- Aza labs faxed to [place] [facility]  Let us know when you change jobs and can start PA for Stelara and Azathioprine*** Will need to warm hand off this to Judith/[facility] so they can monitor while I'm away-   Will proceed with Stelara  Derm follow-up? Yes scheduled  Interested in injection training    Tacrolimus 1.5 mg TWICE DAILYorder placed Urgent    Follow-up: {followuptest2:504831}    EDUCATION:     We reviewed *** today including an overview of the coverage and pharmacy process, mechanism of action, general dosing/administration, side effects (both common/serious), precautions, monitoring for safety and efficacy, as well as time to efficacy. We discussed immunosuppressive precautions including caution with LIVE vaccines unless specifically indicated, and recommendation for indicated non-live/inactivated vaccines. Reviewed potential need to hold medication in the setting of signs/symptoms of infection. Contact information provided in the event they have questions/concerns about the medication.    SUBJECTIVE/OBJECTIVE:                          Poonam Berg is a 28 year old female { :659056} for {mtmvisit:947383} Joined by mom Cassy.     Reason for visit: new start Skyrizi plus Aza    Allergies/ADRs: Reviewed in  "chart  Past Medical History: {1/2/3/4/5:432063}  Tobacco: She reports that she has never smoked. She has never used smokeless tobacco.  Alcohol: Less than 1 beverages / week  {Social and Goals:940034}    Medication Adherence/Access: {[facility]:184186}    Ulcerative Colitis:   ***    Last dose of Rinvoq 45 mg December 27th  Does not have Rinvoq 30 mg -   December 20th       Stelara- Pregnancy , use instead of Skyrizi?       She is ok with standard Azathioprine monitoring    - She asked whether it could be used with Rinvoq     Send [facility] orders to [facility]   Patient does not have insurance right now  Would like to start Skyrizi Aza after new insurance January 2024    Previously on azathioprine 150 mg- de-escalated  Rinvoq 45 mg started 6/5/23, renewed for additioanl 4 weeks on 9/18/23 6/5/23 visit note with Dr. Moeller-\"Continue upadacitinib induction: I expect she will need prolonged induction for 16 weeks \"    16 weeks from 6/5/23 is September 25th 2023  Currently been on Rinvoq x 26 weeks  Last provider visit: 9/27/23  Next provider visit:  Last labs completed: 8/9/2023  Lab frequency: every 3 months   - standing labs yes, lipids, hepatic, esr, crp, cbc with platelets & diff  Next labs due: Now    IBD Health Care Maintenance:    Vaccinations:  All patients on biologics should avoid live vaccines.    -- Influenza (every year)- not on file  -- TdaP (every 10 years)- last 2021  -- Pneumococcal Pneumonia    Prevnar-13: not on file   Pneumovax-23: 9/7/2022, 4/4/2017   Prevnar-20: not on file  -- COVID-19- 1/10/2022  -- Yearly assessment for latent Tb (verbal screening and exam, PPD or QuantiFERON-Tb testing)      {result:682701:::1} not done    One time confirmation of immunity or serologies:  -- Hepatitis A (serologies or immunizations)- serology indicates not immune 2016  -- Hepatitis B (serologies or immunizations)  -- Varicella/Zoster    Varicella- 11/4/2010, 10/29/1998   Zoster- not on file  -- " MMR- 4/25/2003, 10/29/1998  -- HPV (all aged 18-26)- not on file    Due to the immunosuppression in this patient, I would not advise administration of live vaccines such as varicella/VZV, intranasal influenza, MMR, or yellow fever vaccine (if traveling).      Pre-Biologic Screening:  -- Hep B Surface Antibody serologies indicate immunity 11/2017  -- Hep B Surface Antigen non-reactive 11/13/2017  -- Hep B Core Antibody non-reactive 12/3/2016  -- Hep C Antibody not on file    Bone mineral density screening   -- Recommend all patients supplement with calcium and vitamin D  -- Last Dexa 2018 normal  Cancer Screening:  Colon cancer screening:  Given ***, recommend patient undergo regular dysplasia surveillance   Next dysplasia screening is recommended ***.    Cervical cancer screening: Annual due to immunosupression    Skin cancer screening: Annual visual exam of skin by dermatologist since patient is immunocompromised    Depression Screening:- not addressed at this visit  -- Over the last month, have you felt down, depressed, or hopeless?   -- Over the last month, have you felt little interest or pleasure doing things?     Research:- not addressed at this visit  Are you interested in being contacted about enrollment in clinical research studies?     Would you like to receive a quarterly newsletter on research via email.       Misc:  -- Avoid tobacco use  -- Avoid NSAIDs as there is potentially a 25% chance of causing an IBD flare    Supplements:   Multivitamin daily   Supergreens   Herbal cider vinegar             Today's Vitals: LMP 10/27/2023 (Approximate)   ----------------  {MANNIE?:547216}    I spent {[facility] total time 3:238864} with this patient today. { :319408}. A copy of the visit note was provided to the patient's provider(s).    A summary of these recommendations {GIVEN/NOT GIVEN:180622}.    ***    Telemedicine Visit Details  Type of service:  Telephone visit  Start Time:  9:31 AM  End Time:  10:25 AM      Medication Therapy Recommendations  No medication therapy recommendations to display       Medication Therapy Management (MTM) Encounter    ASSESSMENT:                            Medication Adherence/Access: See below for considerations    Ulcerative Colitis:  Poonam would benefit from transitioning to Stelara from Rinvoq in the future, however she currently does not have active insurance. She needs patient assistance to help bridge her with her current Rinvoq supply and I have connected with the pharmacy liaison to help with this. Given Skyrizi is not currently FDA-approved for UC and patient has interest in becoming pregnant in future, I recommend proceeding with Stelara and this was agreed upon with Camilo Rodriguez PA-C. Rinvoq should not be used in combination with azathioprine so we will delay initiating that until she has switched to Stelara. She will be indicated for routine azathioprine monitoring including labs at weeks 2, 4, 8, 12 and 16 followed by every 3 months, with thiopurine metabolite check at week 8 or later to check efficacy, or sooner if indicated.     I recommend treatment with tacrolimus suppository for his current GI symptoms as this may represent a UC flare, and agreement was reached with Camilo Rodriguez PA-C. A prescription was sent to [facility] with this encounter.    She is due for labs including ESR, CRP, BMP, hepatic panel and CBC with platelets and diff which were recommended to be completed monthly, as well as lipid panel which was recommended every 3 months while on extended Rinvoq induction. She is also due for annual tuberculosis screening.    A health maintenance review was not completed with this visit. Reminders for routine cancer screenings was provided.     Supplements: Stable.    PLAN:                            You are due for lab work. This can be completed at any [facility] clinic. Please let us know if you need this faxed elsewhere. We currently  "recommend that you have labs done monthly while on Rinvoq 45 mg once daily. Once you are on Stelara you will be due for labs every 3 months. We also need you to complete an annual tuberculosis screening which has been ordered.  Continue taking Rinvoq 45 mg for now. I will work with the pharmacy liaison to try and get you a \"bridge fill\" through patient assistance program until your new insurance is active.  Please let us know once you have active insurance. After you have active insurance we will secure coverage for Stelara.  We will wait to start Azathioprine until after you are off of the Rinvoq and switch to Stelara. You will be due for additional lab monitoring at Weeks 2, 4, 8, 12, and 16, followed by every 3 months. We can fax these lab orders to the [facility].   Reminder to keep your appointment in March with dermatology for routine skin check.  I will let the nurse coordinators know you are interested in injection training as you transition to Stelara.  I have sent a prescription for Tacrolimus 1.5 mg rectal ointment to [facility] to help with your current GI symptoms.    Follow-up: MTM Visit once ready to switch to Stelara/Azathioprine or as needed; health maintenance review at future MTM visit    EDUCATION:     We reviewed Skyrizi/Stelara/Azathioprine today including an overview of the coverage and pharmacy process, mechanism of action, general dosing/administration, side effects (both common/serious), precautions, monitoring for safety and efficacy, as well as time to efficacy. We discussed immunosuppressive precautions including caution with LIVE vaccines unless specifically indicated, and recommendation for indicated non-live/inactivated vaccines. Reviewed potential need to hold medication in the setting of signs/symptoms of infection. Contact information provided in the event they have questions/concerns about the medication.    SUBJECTIVE/OBJECTIVE:                   "        Poonam Berg is a 28 year old female called for an initial visit. She was referred to me from Camilo Rodriguez PA-C.  Joined by mom Cassy.     Reason for visit: new start Skyrizi plus Aza    Allergies/ADRs: Reviewed in chart  Past Medical History: Reviewed in chart  Tobacco: She reports that she has never smoked. She has never used smokeless tobacco.  Alcohol: Less than 1 beverages / week      Medication Adherence/Access: patient does not currently have insurance coverage, anticipates insurance coverage around December 20th    Ulcerative Colitis:   Rinvoq 45 mg once daily    I saw Poonam today to discuss transition to Skyrizi and azathioprine given ongoing inflammation seen on flex sig, currently on extended induction with Rinvoq 45 mg. Rinvoq was started around 6/5/23. She has been on Rinvoq 45 mg for about 26 weeks. She reports to me that she recently lost insurance coverage and has enough Rinvoq through December 27th. She anticipates the soonest she will have insurance coverage is December 20th. I confirmed she does not have an available supply of Rinvoq 30 mg on hand. I did not ask how long she took the Rinvoq 30 mg dose during our visit and it is unclear based on chart review. Previously recommended by Tim Mcgregor on 9/27/23 to check CBC, LFTs, BMP, ESR, CRP monthly and cholesterol every 3 months until repeat flex sig. The repeat flex sig was recently completed 11/20/23.     She reports new GI symptoms, including at least once incidence with blood in stool. Given past history of severe UC flares I connected with Camilo Rodriguez PA-C and we agreed to start tacrolimus suppository as Uceris tablets and foam are both cost prohibitive without insurance. Entocort tablets through [facility] is affordable however she reports this was not effective when used in past. Dr. Moeller previously wanted her to avoid prednisone as she was using this a lot in past.    Skyrizi is currently not FDA-approved in ulcerative  colitis and thus we are likely to run into insurance issues with getting this approved. Patient also expresses interest in becoming pregnant in the future and therapeutic drug monitoring for Skyrizi is not readily available. Therefore I recommended proceeding with Samantha and Camilo Rodriguez PA-C agreed.     Azathioprine is not recommended to be used in combination with Rinvoq due to increase risk of side effects. It was discussed with the patient that we would not start this until after she transitions to [facility]. We discussed standard azathioprine monitoring and she is amenable to this however she would prefer the lab orders be sent to [facility] in [place] for convenience. She is familiar with azathioprine as she had previously been on this.     Last provider visit: 9/27/23 Camilo Rodriguez PA-C  Next provider visit: 1/10/24 Camilo Rodriguez PA-C  Last labs completed: 8/9/2023  Lab frequency: every 3 months   - standing labs yes, lipids, hepatic, esr, crp, cbc with platelets & diff  Next labs due: Now    IBD Health Care Maintenance:- Not discussed comprehensively given other concerns at today's visit    Vaccinations:  All patients on biologics should avoid live vaccines.    -- Influenza (every year)- not on file  -- TdaP (every 10 years)- last 2021  -- Pneumococcal Pneumonia    Prevnar-13: not on file   Pneumovax-23: 9/7/2022, 4/4/2017   Prevnar-20: not on file  -- COVID-19- 1/10/2022  -- Yearly assessment for latent Tb (verbal screening and exam, PPD or QuantiFERON-Tb testing)      result: not done not done    One time confirmation of immunity or serologies:  -- Hepatitis A (serologies or immunizations)- serology indicates not immune 2016  -- Hepatitis B (serologies or immunizations)  -- Varicella/Zoster    Varicella- 11/4/2010, 10/29/1998   Zoster- not on file  -- MMR- 4/25/2003, 10/29/1998  -- HPV (all aged 18-26)- not on file    Due to the immunosuppression in this  patient, I would not advise administration of live vaccines such as varicella/VZV, intranasal influenza, MMR, or yellow fever vaccine (if traveling).      Pre-Biologic Screening:  -- Hep B Surface Antibody serologies indicate immunity 11/2017  -- Hep B Surface Antigen non-reactive 11/13/2017  -- Hep B Core Antibody non-reactive 12/3/2016  -- Hep C Antibody not on file    Bone mineral density screening   -- Recommend all patients supplement with calcium and vitamin D  -- Last Dexa 2018 normal  Cancer Screening:  Colon cancer screening: Per GI    Cervical cancer screening: Annual due to immunosupression    Skin cancer screening: Annual visual exam of skin by dermatologist since patient is immunocompromised    Depression Screening:- not addressed at this visit  -- Over the last month, have you felt down, depressed, or hopeless?   -- Over the last month, have you felt little interest or pleasure doing things?     Research:- not addressed at this visit  Are you interested in being contacted about enrollment in clinical research studies?     Would you like to receive a quarterly newsletter on research via email.       Misc:  -- Avoid tobacco use  -- Avoid NSAIDs as there is potentially a 25% chance of causing an IBD flare    Supplements:   Multivitamin daily   Supergreens once daily - general health  Herbal cider vinegar once daily - general health    Denies side effects or concerns.    ----------------      I spent 54 minutes with this patient today. All changes were made via collaborative practice agreement with Camilo Rodriguez PA-C. A copy of the visit note was provided to the patient's provider(s).    A summary of these recommendations was sent via [employer].    Javon Chavarria, PharmD, BCPS  MTM Pharmacist   [facility]  Phone: [phone]    Telemedicine Visit Details  Type of service:  Telephone visit  Start Time:  9:31 AM  End Time:  10:25 AM     Medication Therapy Recommendations  No  medication therapy recommendations to display         Again, thank you for allowing me to participate in the care of your patient.        Sincerely,        Javon Chavarria RPH

## 2023-12-08 NOTE — TELEPHONE ENCOUNTER
----- Message from Javon Chavarria [facility] sent at 12/8/2023 10:34 AM CST -----  Anuel Page:    Touching base on Poonam. I saw her today to discuss Skyrizi/Azathioprine.    1) She reports she has had a recent increase in GI symptoms, with one time with blood in stool. Unfortunately she does not have insurance coverage right now until 12/20. Uceris tablets and foam are both cost prohibitive without insurance. Entocort tablets is affordable however she reports this was not effective when used in past. Dr. Moeller previously wanted her to avoid prednisone as she was using this a lot in past. Dr. Mcgregor had suggested rectal tacrolimus which may be an option. I suggest tacrolimus suppository 1.5 mg twice daily (We can send a script for 30 days + 2 refills)    2) Combination with Rinvoq and azathioprine is not recommended by . Are you wanting to start the Azathioprine now or wait until she transitions to Skyrizi?    3) I am concerned with the insurance approval for Skyrizi since it is off-label. I want to avoid any delays in therapy, so I think we should plan to stop appeals if the 1st level appeal is denied, but I defer this judgment to you. One advantage of just trying to use the Stelara early up front instead of PA for Skyrizi is that I know this patient has interest in becoming pregnant and Stelara would allow us to do TDM to monitor drug levels during pregnancy in the future. Let me know what you think.     Suhail Chavarria, PharmD, BCPS  MTM Pharmacist   [facility]  Phone: [phone]

## 2023-12-12 NOTE — TELEPHONE ENCOUNTER
Camilo Rodriguez PA-C Williams, Samuel, [facility]; Nichole Adair, RN  1) ok with tacro supp  2) wait to start aza until transition. No overlap  3) Agree! Lets do stelara tiera in the setting of pregnancy

## 2023-12-13 ENCOUNTER — TELEPHONE (OUTPATIENT)
Dept: GASTROENTEROLOGY | Facility: CLINIC | Age: 28
End: 2023-12-13

## 2023-12-13 RX ORDER — MULTIPLE VITAMINS W/ MINERALS TAB 9MG-400MCG
1 TAB ORAL DAILY
COMMUNITY

## 2023-12-13 NOTE — TELEPHONE ENCOUNTER
I sent [employer] message and left voicemail for patient. When was the insurance terminated, when will the new insurance start, and will it be different insurance than before. Once I get that info I can call my rep Brooke and have her proceed with getting it approved.

## 2023-12-14 NOTE — PATIENT INSTRUCTIONS
"Recommendations from today's MTM visit:                                                      You are due for lab work. This can be completed at any [facility]. Please let us know if you need this faxed elsewhere. We currently recommend that you have labs done monthly while on Rinvoq 45 mg once daily. Once you are on Stelara you will be due for labs every 3 months. We also need you to complete an annual tuberculosis screening which has been ordered.  Continue taking Rinvoq 45 mg for now. I will work with the pharmacy liaison to try and get you a \"bridge fill\" through patient assistance program until your new insurance is active.  Please let us know once you have active insurance. After you have active insurance we will secure coverage for Stelara.  We will wait to start Azathioprine until after you are off of the Rinvoq and switch to Stelara. You will be due for additional lab monitoring at Weeks 2, 4, 8, 12, and 16, followed by every 3 months. We can fax these lab orders to the [facility].   Reminder to keep your appointment in March with dermatology for routine skin check.  I will let the nurse coordinators know you are interested in injection training as you transition to Stelara.  I have sent a prescription for Tacrolimus 1.5 mg rectal ointment to [facility] to help with your current GI symptoms.    Follow-up: MTM Visit once ready to switch to Stelara/Azathioprine or as needed; health maintenance review at future MTM visit    It was great speaking with you today.  I value your experience and would be very thankful for your time in providing feedback in our clinic survey. In the next few days, you may receive an email or text message from [employer] with a link to a survey related to your  clinical pharmacist.\"     To schedule another MTM appointment, please call the clinic directly or you may call the MTM scheduling line at [phone] or toll-free at " [phone].     My Clinical Pharmacist's contact information:                                                      Please feel free to contact me with any questions or concerns you have.      Janessa BenitesD, BCPS  [facility] Pharmacist   [facility]  Phone: [phone]

## 2023-12-26 NOTE — TELEPHONE ENCOUNTER
I just heard from the patient that the old insurance  on 23 and the new one will start 24. I emailed my rep to see if we can get a free fill before the new year

## 2024-01-03 ENCOUNTER — TELEPHONE (OUTPATIENT)
Dept: GASTROENTEROLOGY | Facility: CLINIC | Age: 29
End: 2024-01-03

## 2024-01-05 ENCOUNTER — PATIENT OUTREACH (OUTPATIENT)
Dept: GASTROENTEROLOGY | Facility: CLINIC | Age: 29
End: 2024-01-05
Payer: COMMERCIAL

## 2024-01-10 ENCOUNTER — VIRTUAL VISIT (OUTPATIENT)
Dept: GASTROENTEROLOGY | Facility: CLINIC | Age: 29
End: 2024-01-10
Attending: PHYSICIAN ASSISTANT
Payer: COMMERCIAL

## 2024-01-10 DIAGNOSIS — K51.011 ULCERATIVE PANCOLITIS WITH RECTAL BLEEDING (H): Primary | ICD-10-CM

## 2024-01-10 PROCEDURE — 99214 OFFICE O/P EST MOD 30 MIN: CPT | Mod: 95 | Performed by: PHYSICIAN ASSISTANT

## 2024-01-10 NOTE — PROGRESS NOTES
Virtual Visit Details    Type of service:  Video Visit     Originating Location (pt. Location): Home    Distant Location (provider location):  Off-site  Platform used for Video Visit: [facility]    IBD CLINIC VISIT   ASSESSMENT/PLAN    28 year old reji with Ulcerative pancolitis     1. Ulcerative colitis, pancolitis:   Current UC medications: None    Current clinical disease activity: Rocha score: 9  Last endoscopic disease activity: eMayo 2 on flex sig 11/2023. Pseudopolyps in rectum, sigmoid and descending.  Erythematous mucosa at the splenic flexure, moderate inflammation in the proximal and distal rectum  by normal-appearing mucosa, remainder of the colon largely without active inflammation except a small patch at the splenic flexure.  Biopsies show chronic active inflammation, Veronica grade 4 at the splenic flexure, need to grade 1 in the sigmoid, needs to grade 3-4 in the rectum.  No dysplasia noted.    At this time with repeated endoscopic assessment, Rinvoq 45 mg is not allowing for sufficient progress to healing.  This is complicated by a gap in insurance last year with the delay in transitioning to another medication.      We will plan to transition to Stelara with azathioprine combination.  Given the level of inflammation in the rectum we will also use tacrolimus suppositories.  We are working hard to minimize steroids, specifically prednisone given her significant use in the past.  Budesonide has not been effective previously.  We will also consider vancomycin restart given she is having active symptoms but will hold for now.    --Labs to be completed for baseline and biologic transitioning with thiopurine lab schedule  --Start Stelara   -- Start azathioprine 150 mg daily pending TPMT  -- Start tacrolimus suppository  -- Continue working with [facility] pharmacy      Camilo Rodriguez PA-C  Division of Gastroenterology, Hepatology and Nutrition  [facility]     2. Iron deficiency anemia, Celiac  serology negative. Could be slow occult bleeding from pseudopolyps.  At this time expect she will have recurrent iron deficiency anemia given her colitis  -- completed iron infusions    3. History of C Diff infection: 2018 and 2022.  December 22 was discordant testing.  See discussion above regarding colonization and marker of severe colitis versus infection.  --Avoid FMT in the future given possibility of stimulating ulcerative colitis flare  -- May consider restarting vancomycin    4. Dysplasia surveillance: Last colonoscopy 5/2023.  Due every 2 years     return to clinic in 6-8 weeks.    Thank you for this consultation.  It was a pleasure to participate in the care of this patient; please contact us with any further questions.      Camilo Rodriguez PA-C  Division of Gastroenterology, Hepatology and Nutrition  [facility]     IBD HISTORY  Age at diagnosis: 20  Extent of disease: pancolitis  Prior UC surgeries:  Prior IBD Medications:   - Canasa   - Infliximab 5mg/kg (400mg) q6   - Azathioprine 150mg (sepetmebr 2018 - Summer 2022) - de-escalated due to    - Adalimumab weekly (started Nov 2018, dose escalated Nov 2019)   - Vedolizumab: Fall 2022 - May 2023 - loss of respose q8 dosing   - Rinvoq 45 mg: persistent moderate disease on flex sig     DRUG MONITORING  TPMT enzyme activity:      6-TGN/6-MMPN levels:  7/15/20: 6-TGN: 137, 6MMPN 3888     Biologic concentration:  7/3/18: IFX: <1, Ab 30 (normal < 50) - Rocha test (q8 weeks, monotherapy)  11/5/18: IFX: Not detected, antibodies: 14     11/15/19: Adalimumab: 3.2, no antibodies     HPI:   She is now having 5-6 stools per day. Blood with every BM. Has urgency, no fecal incontinence. Occasional nighttime stools.   She has been off of rinvoq x 2 weeks due to lack of insurance in Dec.   She has insurance now as of Dec 31.    Rocha score:   Stool freq: 2 (3-4 stools/day more than normal)  Rectal bleeding: 3 (Passage of blood alone)  PGA: 2  (Moderate)  Endoscopy: 2 (Moderate)    Remission: <3   Mild disease: 3-5  Moderate disease: 6-10  Severe disease: >10    Extra intestinal manifestations of IBD:  No uveitis/episcleritis  No: aphthous ulcers   No arthritis   No erythema nodosum/pyoderma gangrenosum.     Nutrition goals:  Not limiting anything at this time.    sIBDQ:  IBDQ Score Date IBDQ - Total Score  (Higher score better)   3/14/2023   8:55 AM 62   11/15/2019  11:36 AM 57        ROS:    Constitutional, HEENT, cardiovascular, pulmonary, GI, , musculoskeletal, neuro, skin, endocrine and psych systems are negative, except as otherwise noted.    PHYSICAL EXAMINATION:  Constitutional: aaox3, cooperative, pleasant, not dyspneic/diaphoretic, no acute distress  Vitals reviewed: LMP 10/27/2023 (Approximate)   Wt:   Wt Readings from Last 2 Encounters:   11/20/23 77.1 kg (170 lb)   08/09/23 77.1 kg (170 lb)      Constitutional - general appearance is well and in no acute distress. Body habitus normal  Eyes - No redness or discharge  Respiratory - No cough, unlabored breathing  Musculoskeletal - range of motion intact: Neck and arms  Skin - No discoloration or lesions  Neurological - No tremors, headaches  Psychiatric - No anxiety, alert & oriented    PERTINENT PAST MEDICAL HISTORY:  Past Medical History:   Diagnosis Date    Ulcerative colitis (H)        PREVIOUS SURGERIES:  Past Surgical History:   Procedure Laterality Date    COLONOSCOPY N/A 5/31/2023    Procedure: COLONOSCOPY, WITH BIOPSY;  Surgeon: Gabino Krueger MD;  Location: [facility] OR       ALLERGIES:     Allergies   Allergen Reactions    Infliximab Nausea and Other (See Comments)     FACE FLUSHING WITH ITCHY/SCRATCHY THROAT.        PERTINENT MEDICATIONS:    Current Outpatient Medications:     COMPOUNDED NON-CONTROLLED SUBSTANCE (CMPD RX) - PHARMACY TO MIX COMPOUNDED MEDICATION, Place 1.5 mg tacrolimus (3 g) ointment rectally twice daily via vaginal topiclick device., Disp: 180 g, Rfl: 2     EPINEPHrine (ANY BX GENERIC EQUIV) 0.3 MG/0.3ML injection 2-pack, Inject 0.3 mLs (0.3 mg) into the muscle as needed for anaphylaxis May repeat one time in 5-15 minutes if response to initial dose is inadequate., Disp: 2 each, Rfl: 0    HERBALS, Herbal cider vinegar, supergreens supplement, Disp: , Rfl:     multivitamin w/minerals (THERA-VIT-M) tablet, Take 1 tablet by mouth daily, Disp: , Rfl:     RINVOQ 45 MG TB24, TAKE 1 TABLET BY MOUTH DAILY, Disp: 28 tablet, Rfl: 3    SOCIAL HISTORY:  Social History     Socioeconomic History    Marital status:      Spouse name: Not on file    Number of children: Not on file    Years of education: Not on file    Highest education level: Not on file   Occupational History    Not on file   Tobacco Use    Smoking status: Never    Smokeless tobacco: Never   Vaping Use    Vaping Use: Never used   Substance and Sexual Activity    Alcohol use: Not Currently     Comment: 1-2 monthly    Drug use: Not Currently    Sexual activity: Not on file   Other Topics Concern    Not on file   Social History Narrative    Not on file     Social Determinants of Health     Financial Resource Strain: Not on file   Food Insecurity: Not on file   Transportation Needs: Not on file   Physical Activity: Not on file   Stress: Not on file   Social Connections: Not on file   Interpersonal Safety: Not on file   Housing Stability: Not on file     FAMILY HISTORY:  No FH of IBD/CRC.  No family history on file.

## 2024-01-10 NOTE — LETTER
1/10/2024         RE: Poonam Berg  [address] [facility] [address]        Dear Colleague,    Thank you for referring your patient, Poonam Berg, to the [facility]. Please see a copy of my visit note below.      IBD CLINIC VISIT   ASSESSMENT/PLAN    28 year old feljosette with Ulcerative pancolitis     1. Ulcerative colitis, pancolitis:   Current UC medications: None    Current clinical disease activity: Rocha score: 9  Last endoscopic disease activity: eMayo 2 on flex sig 11/2023. Pseudopolyps in rectum, sigmoid and descending.  Erythematous mucosa at the splenic flexure, moderate inflammation in the proximal and distal rectum  by normal-appearing mucosa, remainder of the colon largely without active inflammation except a small patch at the splenic flexure.  Biopsies show chronic active inflammation, Veronica grade 4 at the splenic flexure, need to grade 1 in the sigmoid, needs to grade 3-4 in the rectum.  No dysplasia noted.    At this time with repeated endoscopic assessment, Rinvoq 45 mg is not allowing for sufficient progress to healing.  This is complicated by a gap in insurance last year with the delay in transitioning to another medication.      We will plan to transition to Stelara with azathioprine combination.  Given the level of inflammation in the rectum we will also use tacrolimus suppositories.  We are working hard to minimize steroids, specifically prednisone given her significant use in the past.  Budesonide has not been effective previously.  We will also consider vancomycin restart given she is having active symptoms but will hold for now.    --Labs to be completed for baseline and biologic transitioning with thiopurine lab schedule  --Start Stelara   -- Start azathioprine 150 mg daily pending TPMT  -- Start tacrolimus suppository  -- Continue working with [facility] pharmacy      Camilo Rodriguez PA-C  Division of Gastroenterology, Hepatology and  Nutrition  [facility]     2. Iron deficiency anemia, Celiac serology negative. Could be slow occult bleeding from pseudopolyps.  At this time expect she will have recurrent iron deficiency anemia given her colitis  -- completed iron infusions    3. History of C Diff infection: 2018 and 2022.  December 22 was discordant testing.  See discussion above regarding colonization and marker of severe colitis versus infection.  --Avoid FMT in the future given possibility of stimulating ulcerative colitis flare  -- May consider restarting vancomycin    4. Dysplasia surveillance: Last colonoscopy 5/2023.  Due every 2 years     return to clinic in 6-8 weeks.    Thank you for this consultation.  It was a pleasure to participate in the care of this patient; please contact us with any further questions.      Camilo Rodriguez PA-C  Division of Gastroenterology, Hepatology and Nutrition  [facility]     IBD HISTORY  Age at diagnosis: 20  Extent of disease: pancolitis  Prior UC surgeries:  Prior IBD Medications:   - Canasa   - Infliximab 5mg/kg (400mg) q6   - Azathioprine 150mg (sepetmebr 2018 - Summer 2022) - de-escalated due to    - Adalimumab weekly (started Nov 2018, dose escalated Nov 2019)   - Vedolizumab: Fall 2022 - May 2023 - loss of respose q8 dosing   - Rinvoq 45 mg: persistent moderate disease on flex sig     DRUG MONITORING  TPMT enzyme activity:      6-TGN/6-MMPN levels:  7/15/20: 6-TGN: 137, 6MMPN 3888     Biologic concentration:  7/3/18: IFX: <1, Ab 30 (normal < 50) - Rocha test (q8 weeks, monotherapy)  11/5/18: IFX: Not detected, antibodies: 14     11/15/19: Adalimumab: 3.2, no antibodies     HPI:   She is now having 5-6 stools per day. Blood with every BM. Has urgency, no fecal incontinence. Occasional nighttime stools.   She has been off of rinvoq x 2 weeks due to lack of insurance in Dec.   She has insurance now as of Dec 31.    Rocha score:   Stool freq: 2 (3-4 stools/day more than  normal)  Rectal bleeding: 3 (Passage of blood alone)  PGA: 2 (Moderate)  Endoscopy: 2 (Moderate)    Remission: <3   Mild disease: 3-5  Moderate disease: 6-10  Severe disease: >10    Extra intestinal manifestations of IBD:  No uveitis/episcleritis  No: aphthous ulcers   No arthritis   No erythema nodosum/pyoderma gangrenosum.     Nutrition goals:  Not limiting anything at this time.    sIBDQ:  IBDQ Score Date IBDQ - Total Score  (Higher score better)   3/14/2023   8:55 AM 62   11/15/2019  11:36 AM 57        ROS:    Constitutional, HEENT, cardiovascular, pulmonary, GI, , musculoskeletal, neuro, skin, endocrine and psych systems are negative, except as otherwise noted.    PHYSICAL EXAMINATION:  Constitutional: aaox3, cooperative, pleasant, not dyspneic/diaphoretic, no acute distress  Vitals reviewed: LMP 10/27/2023 (Approximate)   Wt:   Wt Readings from Last 2 Encounters:   11/20/23 77.1 kg (170 lb)   08/09/23 77.1 kg (170 lb)      Constitutional - general appearance is well and in no acute distress. Body habitus normal  Eyes - No redness or discharge  Respiratory - No cough, unlabored breathing  Musculoskeletal - range of motion intact: Neck and arms  Skin - No discoloration or lesions  Neurological - No tremors, headaches  Psychiatric - No anxiety, alert & oriented    PERTINENT PAST MEDICAL HISTORY:  Past Medical History:   Diagnosis Date    Ulcerative colitis (H)        PREVIOUS SURGERIES:  Past Surgical History:   Procedure Laterality Date    COLONOSCOPY N/A 5/31/2023    Procedure: COLONOSCOPY, WITH BIOPSY;  Surgeon: Gabino Krueger MD;  Location: [facility] OR       ALLERGIES:     Allergies   Allergen Reactions    Infliximab Nausea and Other (See Comments)     FACE FLUSHING WITH ITCHY/SCRATCHY THROAT.        PERTINENT MEDICATIONS:    Current Outpatient Medications:     COMPOUNDED NON-CONTROLLED SUBSTANCE (CMPD RX) - PHARMACY TO MIX COMPOUNDED MEDICATION, Place 1.5 mg tacrolimus (3 g) ointment rectally twice daily  via vaginal topiclick device., Disp: 180 g, Rfl: 2    EPINEPHrine (ANY BX GENERIC EQUIV) 0.3 MG/0.3ML injection 2-pack, Inject 0.3 mLs (0.3 mg) into the muscle as needed for anaphylaxis May repeat one time in 5-15 minutes if response to initial dose is inadequate., Disp: 2 each, Rfl: 0    HERBALS, Herbal cider vinegar, supergreens supplement, Disp: , Rfl:     multivitamin w/minerals (THERA-VIT-M) tablet, Take 1 tablet by mouth daily, Disp: , Rfl:     RINVOQ 45 MG TB24, TAKE 1 TABLET BY MOUTH DAILY, Disp: 28 tablet, Rfl: 3    SOCIAL HISTORY:  Social History     Socioeconomic History    Marital status:      Spouse name: Not on file    Number of children: Not on file    Years of education: Not on file    Highest education level: Not on file   Occupational History    Not on file   Tobacco Use    Smoking status: Never    Smokeless tobacco: Never   Vaping Use    Vaping Use: Never used   Substance and Sexual Activity    Alcohol use: Not Currently     Comment: 1-2 monthly    Drug use: Not Currently    Sexual activity: Not on file   Other Topics Concern    Not on file   Social History Narrative    Not on file     Social Determinants of Health     Financial Resource Strain: Not on file   Food Insecurity: Not on file   Transportation Needs: Not on file   Physical Activity: Not on file   Stress: Not on file   Social Connections: Not on file   Interpersonal Safety: Not on file   Housing Stability: Not on file     FAMILY HISTORY:  No FH of IBD/CRC.  No family history on file.      Again, thank you for allowing me to participate in the care of your patient.      Sincerely,    Camilo Rodriguez PA-C

## 2024-01-10 NOTE — PATIENT INSTRUCTIONS
It was a pleasure taking care of you today.  I've included a brief summary of our discussion and care plan from today's visit below.  Please review this information with your primary care provider.  ______________________________________________________________________    My recommendations are summarized as follows:    -- Start Stelara  --Start azathioprine pending labs  --We will be starting the lab schedule for azathioprine start  --We will also plan to start tacrolimus suppositories  -- Labs every 3 months  -- Next endoscopic assessment: 6 months for flex sig  -- Patient with IBD we recommend supplementation vitamin D 1000 units daily and calcium 500 mg twice daily.  -- Vaccines/immunizations to be updated: flu and updated covid shot  -- No NSAIDs (ibuprofen, or anything containing ibuprofen)    For additional resources about inflammatory bowel disease visit http://www.crohnscolitisfoundation.org/    To learn more about Diet and Nutrition in the setting of IBD, check out some of these resources:  https://www.crohnscolitisfoundation.org/diet-and-nutrition/what-should-i-eat  https://www.nimbal.org/  https://ntforibd.org/      Return to GI Clinic in 6-8 weeks to review your progress.    ______________________________________________________________________    How do I schedule labs, imaging studies, or procedures that were ordered in clinic today?     Labs: To schedule lab appointment at the [facility], use my chart or call [phone]. If you have a [facility] lab closer to home where you are regularly seen you can give them a call.     Procedures: If a colonoscopy, upper endoscopy, breath test, esophageal manometry, or pH impedence was ordered today, our endoscopy team will call you to schedule this. If you have not heard from our endoscopy team within a week, please call [phone] to schedule.     Imaging Studies: If you were scheduled for a CT scan, X-ray, MRI, ultrasound, HIDA scan or other  imaging study, please call [phone] to have this scheduled.     Referral: If a referral to another specialty was ordered, expect a phone call or follow instructions above. If you have not heard from anyone regarding your referral in a week, please call our clinic to check the status.     Who do I call with any questions after my visit?  Please be in touch if there are any further questions that arise following today's visit.  There are multiple ways to contact your gastroenterology care team.      During business hours, you may reach a Gastroenterology nurse at [phone]    To schedule or reschedule an appointment, please call [phone].     You can always send a secure message through TalkyLand.  TalkyLand messages are answered by your nurse or doctor typically within 24 hours.  Please allow extra time on weekends and holidays.      For urgent/emergent questions after business hours, you may reach the on-call GI Fellow by contacting the [facility]  at [phone].     How will I get the results of any tests ordered?    You will receive all of your results.  If you have signed up for Tag[employer], any tests ordered at your visit will be available to you after your physician reviews them.  Typically this takes 1-2 weeks.  If there are urgent results that require a change in your care plan, your physician or nurse will call you to discuss the next steps.      What is TalkyLand?  TalkyLand is a secure way for you to access all of your healthcare records from the [facility].  It is a web based computer program, so you can sign on to it from any location.  It also allows you to send secure messages to your care team.  I recommend signing up for TalkyLand access if you have not already done so and are comfortable with using a computer.         Sincerely,    Camilo Rodriguez PA-C  [facility]  Division of Gastroenterology

## 2024-01-10 NOTE — NURSING NOTE
Is the patient currently in the state of [place]? YES    Visit mode:VIDEO    If the visit is dropped, the patient can be reconnected by: VIDEO VISIT: Text to cell phone:   Telephone Information:   Mobile [phone]       Will anyone else be joining the visit? NO  (If patient encounters technical issues they should call [phone]    How would you like to obtain your AVS? MyChart    Are changes needed to the allergy or medication list? No    Reason for visit: RECHECK    Jatinder ROCKWELL

## 2024-01-11 ENCOUNTER — TELEPHONE (OUTPATIENT)
Dept: GASTROENTEROLOGY | Facility: CLINIC | Age: 29
End: 2024-01-11
Payer: COMMERCIAL

## 2024-01-11 NOTE — TELEPHONE ENCOUNTER
Left Voicemail (1st Attempt) and Sent Mychart (1st Attempt) for the patient to call back and schedule the following:    Appointment type: Return IBD  Provider: Camilo Rodriguez  Return date: 03/10/24  Specialty phone number: [phone]  Additional appointment(s) needed: N/A  Additonal Notes: N/A

## 2024-01-15 ENCOUNTER — PATIENT OUTREACH (OUTPATIENT)
Dept: GASTROENTEROLOGY | Facility: CLINIC | Age: 29
End: 2024-01-15
Payer: COMMERCIAL

## 2024-01-15 DIAGNOSIS — K51.011 ULCERATIVE PANCOLITIS WITH RECTAL BLEEDING (H): Primary | ICD-10-CM

## 2024-01-15 NOTE — PROGRESS NOTES
[11:51 AM] Suhail Chavarria touching base on Poonam MRN: [id number] It looks like Camilo wanted a TPMT RBC done prior to starting azathioprine. It looks like she doesn't have a current order for that. It looks like it was canceled due to being a duplicate but I don't see a prior one on file. Could you reorder this?

## 2024-01-18 ENCOUNTER — TELEPHONE (OUTPATIENT)
Dept: GASTROENTEROLOGY | Facility: CLINIC | Age: 29
End: 2024-01-18
Payer: COMMERCIAL

## 2024-01-18 ENCOUNTER — PATIENT OUTREACH (OUTPATIENT)
Dept: GASTROENTEROLOGY | Facility: CLINIC | Age: 29
End: 2024-01-18
Payer: COMMERCIAL

## 2024-01-18 NOTE — PROGRESS NOTES
Per Suhail URIBE,-pharmacist, TPMT lab is not needed. Will have it discontinued.    Cancel the TPMT lab order.    [facility] Team,  Please call [facility] and discontinue the TPMT.  Thank you.  Nichole

## 2024-01-18 NOTE — TELEPHONE ENCOUNTER
PA Initiation    Medication: USTEKINUMAB 90 MG/ML [facility]  Insurance Company: [employer] FEDERAL - Phone [phone] Fax [phone]  Pharmacy Filling the Rx:    Filling Pharmacy Phone:    Filling Pharmacy Fax:    Start Date: 1/3/2024  [id number]

## 2024-01-18 NOTE — TELEPHONE ENCOUNTER
----- Message from Mildred Hernandez LPN sent at 1/18/2024  2:24 PM CST -----    ----- Message -----  From: Javon Chavarria [facility]  Sent: 1/18/2024   2:19 PM CST  To: Nichole Adair RN; #    Hi all:    Poonam will need orders faxed to [facility] in [place].     She will need thiopurine methyltransferase RBC x 1     Plus standard azathioprine monitoring labs: CRP, ESR, Hepatic Panel, CBC with platelets x 8 (she will get them done at week 0, 2, 4, 8, 12 and 16 followed by every 3 months)

## 2024-01-18 NOTE — TELEPHONE ENCOUNTER
Patient requested labs to be sent to their outside lab.     Clinic: [facility] in [place]      Fax: [phone]      Labs faxed:   thiopurine methyltransferase RBC x 1   CRP  Hepatic Panel  CBC with platelets x 8 (she will get them done at week 0, 2, 4, 8, 12 and 16 followed by every 3 months)      Confirmed receipt to the facility? Yes spoke with Carol who stated they got the lab orders and informed that we are cancelling the thiopurine methyltransferase RBC order.    Per Suhail URIBE,-pharmacist, TPMT lab is not needed. Will have it discontinued.     Cancel the TPMT lab order.     Hello Team,  Please call [facility] and discontinue the TPMT.  Thank you.  Nichole Hernandez LPN

## 2024-01-19 NOTE — TELEPHONE ENCOUNTER
COPAY CARD OBTAINED    Medication: USTEKINUMAB 90 MG/ML [facility] SOSY  Sponsor: [employer]   Member ID:    BIN:    PCN:    Group:    Expected Copay: $    Copay card Monthly Max Amount: $ 1,500  Copay card Annual Amount: $ 18,000  Left message for Poonam to sign up for copay card at [facility]

## 2024-01-19 NOTE — TELEPHONE ENCOUNTER
Prior Authorization Approval    Medication: USTEKINUMAB 90 MG/ML [facility] SOSY  Authorization Effective Date: 12/19/2023  Authorization Expiration Date: 1/17/2025  Approved Dose/Quantity: 1/56  Reference #: [id number]   Insurance Company: [employer] - Phone [phone] Fax [phone]  Expected CoPay: $    CoPay Card Available: Yes    Financial Assistance Needed:    Which Pharmacy is filling the prescription: [facility] - [address] - [address] [facility] AT [address] TO [facility]  Pharmacy Notified:    Patient Notified:  yes left voicemail

## 2024-01-23 ENCOUNTER — TELEPHONE (OUTPATIENT)
Dept: GASTROENTEROLOGY | Facility: CLINIC | Age: 29
End: 2024-01-23
Payer: COMMERCIAL

## 2024-01-23 ENCOUNTER — VIRTUAL VISIT (OUTPATIENT)
Dept: GASTROENTEROLOGY | Facility: CLINIC | Age: 29
End: 2024-01-23
Attending: PHYSICIAN ASSISTANT
Payer: COMMERCIAL

## 2024-01-23 DIAGNOSIS — K51.00 ULCERATIVE PANCOLITIS (H): Primary | ICD-10-CM

## 2024-01-23 DIAGNOSIS — K51.011 ULCERATIVE PANCOLITIS WITH RECTAL BLEEDING (H): Primary | ICD-10-CM

## 2024-01-23 NOTE — TELEPHONE ENCOUNTER
Spoke with Cherise at [facility] regarding lab clarification. Confirm with Cherise every lab except TPMT needs to be done. All labs except TB is standing orders. Cherise agree to plan and will call pt to let her know she can come in any time to complete.

## 2024-01-23 NOTE — TELEPHONE ENCOUNTER
January 23, 2024    Called Poonam Left message and contact info to return call regarding: recommendation

## 2024-01-23 NOTE — PROGRESS NOTES
Medication Therapy Management (MTM) Encounter    ASSESSMENT:                            Medication Adherence/Access: See below for considerations    Ulcerative Colitis:  Poonam would benefit from starting treatment with tacrolimus suppository, Stelara and azathioprine. They are due for routine maintenance labs. They are due for annual tuberculosis screening. They are indicated for additional lab work including standard monitoring once starting azathioprine. [place] is currently completing prior authorization for Stelara IV induction; self injections have been approved. A full health maintenance review was not completed with this visit.    PLAN:                            Start tacrolimus 1.5 mg rectal ointment. The prescription is at [facility]. This will help with your current GI symptoms.  Please complete lab work as soon as possible. These have been faxed to [facility].  You will be due for additional lab monitoring once you start azathioprine at weeks 2, 4, 8, 12 and 16 followed by every 3 months.   Reminder to keep your appointment in March with dermatology for routine skin check.   I will let the nurse coordinators know you are interested in injection training as you transition to Stelara.    Follow-up: 1/31/24    SUBJECTIVE/OBJECTIVE:                          Poonam Berg is a 28 year old female called for a follow-up visit from 12/8/23.     Reason for visit: Tacrolimus suppository, Stelara new start, Azathioprine.    Allergies/ADRs: Reviewed in chart  Past Medical History: Reviewed in chart  Tobacco: She reports that she has never smoked. She has never used smokeless tobacco.  Alcohol: Less than 1 beverages / week      Medication Adherence/Access: has been off medications due to losing insurance at end of last year    Ulcerative Colitis:   Tacrolimus suppository (not started)  Stelara (planning to start)    Will have Stelara IV induction done at [place], not yet approved. Hasn't started  "tacrolimus suppository yet.       Last provider visit: 1/10/24  Next provider visit: 4/10/24  Last labs completed: 23  Lab frequency: minimum every 3 months   - standing labs hepatic, esr, crp, cbc with platelets & diff, bmp  23  Next labs due:   Last IBD Health Maintenance Review: not done      Pre-biologic screening:  Hepatitis B surface antibody: serology indicates immunity   Hepatitis B surface antigen: nonreactive 12/3/2016  Hepatitis B core antibody: nonreactive 12/3/2016  Quantiferon-Tb: not done Negative 12/3/2016    Thiopurine methyltransferase (resulted 12/3/2016 found in [facility])- 28.6 U/mL (normal)    Previously on azathioprine 150 mg once daily     6-TGN/6-MMPN levels:  7/15/20: 6-TGN: 137, 6MMPN 3888    Plan from Camilo Rodriguez 1/10/24:   \"--Labs to be completed for baseline and biologic transitioning with thiopurine lab schedule  --Start Stelara   -- Start azathioprine 150 mg daily pending TPMT  -- Start tacrolimus suppository  -- Continue working with [facility] pharmacy\"      ----------------      I spent 30 minutes with this patient today. All changes were made via collaborative practice agreement with Camilo Rodriguez PA-C. A copy of the visit note was provided to the patient's provider(s).    A summary of these recommendations was given to the patient.    Javon Chavarria, PharmD, BCPS  [facility] Pharmacist   [facility]  Phone: [phone]    Telemedicine Visit Details  Type of service:  Telephone visit  Start Time:  12:30 PM  End Time:  1:00 PM     Medication Therapy Recommendations  No medication therapy recommendations to display     "

## 2024-01-23 NOTE — TELEPHONE ENCOUNTER
----- Message from Javon Chavarria [facility] sent at 1/23/2024 12:49 PM CST -----  Anuel Varela:    I just met with Poonam to discuss next steps.  1) Can you start an infusion therapy plan for Stelara and let her know how to schedule this?   2) She's telling me that [facility] had some clarification questions on the lab orders that were sent. Looks like Mildred had a telephone encounter for this- Can you follow-up on this and see if we need to do anything? She will need baseline labs prior to starting Stelara/Azathioprine    Thank you!!  Suhail Chavarria, PharmD, BCPS  MTM Pharmacist   [facility]  Phone: [phone]

## 2024-01-23 NOTE — Clinical Note
2024         RE: Poonam Berg  [address] [place] [address]        Dear Colleague,    Thank you for referring your patient, Poonam Berg, to the [facility]. Please see a copy of my visit note below.    Medication Therapy Management (MTM) Encounter    ASSESSMENT:                            Medication Adherence/Access: {adherencechoices:715928}    Ulcerative Colitis:  ***      PLAN:                            Start tacrolimus 1.5 mg rectal ointment. The prescription is at [facility]. This will help with your current GI symptoms.  Please complete lab work as soon as possible. These have been faxed to [facility].  You will be due for additional lab monitoring once you start azathioprine at weeks 2, 4, 8, 12 and 16 followed by every 3 months.   Reminder to keep your appointment in March with dermatology for routine skin check.   I will let the nurse coordinators know you are interested in injection training as you transition to Stelara.    Follow-up: {followuptest2:379389}    SUBJECTIVE/OBJECTIVE:                          Poonam Berg is a 28 year old female { :981255} for {mtmvisit:606830}     Reason for visit: Tacrolimus suppository, Stelara new start, Azathioprine.    Allergies/ADRs: {/3/:183485}  Past Medical History: Reviewed in chart  Tobacco: She reports that she has never smoked. She has never used smokeless tobacco.  Alcohol: Less than 1 beverages / week  {Social and Goals:044601}    Medication Adherence/Access: {fumedadherence:955469}    Ulcerative Colitis:   Rinvoq 45 mg once daily (holding)  Tacrolimus suppository (not started)        Last provider visit: 1/10/24  Next provider visit: 4/10/24  Last labs completed: 23  Lab frequency: minimum every 3 months   - standing labs hepatic, esr, crp, cbc with platelets & diff, bmp  23  Next labs due:   Last IBD Health Maintenance Review: not done      Pre-biologic  "screening:  Hepatitis B surface antibody: serology indicates immunity 2017  Hepatitis B surface antigen: nonreactive 12/3/2016  Hepatitis B core antibody: nonreactive 12/3/2016  Quantiferon-Tb: not done Negative 12/3/2016    Thiopurine methyl[facility] 28.6 U/mL (normal)    Previously on azathioprine 150 mg once daily     6-TGN/6-MMPN levels:  7/15/20: 6-TGN: 137, 6MMPN 3888    Plan from Camilo Rodriguez 1/10/24: --Labs to be completed for baseline and biologic transitioning with thiopurine lab schedule  --Start Stelara   -- Start azathioprine 150 mg daily pending TPMT  -- Start tacrolimus suppository  -- Continue working with [facility] pharmacy  \"      Today's Vitals: There were no vitals taken for this visit.  ----------------  {MANNIE?:451510}    I spent {mt total time 3:433422} with this patient today. { :324608}. A copy of the visit note was provided to the patient's provider(s).    A summary of these recommendations {GIVEN/NOT GIVEN:792152}.    ***    Telemedicine Visit Details  Type of service:  {[facility]:327308::\"Telephone visit\"}  Start Time: {video/phone visit start time:152948}  End Time: {video/phone visit end time:152948}     Medication Therapy Recommendations  No medication therapy recommendations to display       Medication Therapy Management (MTM) Encounter    ASSESSMENT:                            Medication Adherence/Access: See below for considerations    Ulcerative Colitis:  Poonam would benefit from starting treatment with tacrolimus suppository, Stelara and azathioprine. They are due for routine maintenance labs. They are due for annual tuberculosis screening. They are indicated for additional lab work including standard monitoring once starting azathioprine. [place] is currently completing prior authorization for Stelara IV induction; self injections have been approved. A full health maintenance review was not completed with this " visit.    PLAN:                            Start tacrolimus 1.5 mg rectal ointment. The prescription is at [facility]. This will help with your current GI symptoms.  Please complete lab work as soon as possible. These have been faxed to [facility].  You will be due for additional lab monitoring once you start azathioprine at weeks 2, 4, 8, 12 and 16 followed by every 3 months.   Reminder to keep your appointment in March with dermatology for routine skin check.   I will let the nurse coordinators know you are interested in injection training as you transition to Stelara.    Follow-up: 24    SUBJECTIVE/OBJECTIVE:                          Poonam Berg is a 28 year old female called for a follow-up visit from 23.     Reason for visit: Tacrolimus suppository, Stelara new start, Azathioprine.    Allergies/ADRs: Reviewed in chart  Past Medical History: Reviewed in chart  Tobacco: She reports that she has never smoked. She has never used smokeless tobacco.  Alcohol: Less than 1 beverages / week      Medication Adherence/Access: has been off medications due to losing insurance at end of last year    Ulcerative Colitis:   Tacrolimus suppository (not started)  Stelara (planning to start)    Will have Stelara IV induction done at [place], not yet approved. Hasn't started tacrolimus suppository yet.       Last provider visit: 1/10/24  Next provider visit: 4/10/24  Last labs completed: 23  Lab frequency: minimum every 3 months   - standing labs hepatic, esr, crp, cbc with platelets & diff, bmp  23  Next labs due:   Last IBD Health Maintenance Review: not done      Pre-biologic screening:  Hepatitis B surface antibody: serology indicates immunity   Hepatitis B surface antigen: nonreactive 12/3/2016  Hepatitis B core antibody: nonreactive 12/3/2016  Quantiferon-Tb: not done Negative 12/3/2016    Thiopurine methyltransferase (resulted 12/3/2016 found in [facility])- 28.6 U/mL (normal)    Previously on azathioprine 150 mg once daily     6-TGN/6-MMPN levels:  7/15/20: 6-TGN: 137, 6MMPN 3888    Plan from Camilo Rodriguez 1/10/24:   \"--Labs to be completed for baseline and biologic transitioning with thiopurine lab schedule  --Start Stelara   -- Start azathioprine 150 mg daily pending TPMT  -- Start tacrolimus suppository  -- Continue working with [facility] pharmacy\"      ----------------      I spent 30 minutes with this patient today. All changes were made via collaborative practice agreement with Camilo Rodriguez PA-C. A copy of the visit note was provided to the patient's provider(s).    A summary of these recommendations was given to the patient.    Javon Chavarria, PharmD, BCPS  [facility] Pharmacist   [facility]  Phone: [phone]    Telemedicine Visit Details  Type of service:  Telephone visit  Start Time:  12:30 PM  End Time:  1:00 PM     Medication Therapy Recommendations  No medication therapy recommendations to display         Again, thank you for allowing me to participate in the care of your patient.        Sincerely,        Javon Chavarria [facility]  "

## 2024-01-24 RX ORDER — MEPERIDINE HYDROCHLORIDE 25 MG/ML
25 INJECTION INTRAMUSCULAR; INTRAVENOUS; SUBCUTANEOUS EVERY 30 MIN PRN
OUTPATIENT
Start: 2024-01-24

## 2024-01-24 RX ORDER — DIPHENHYDRAMINE HYDROCHLORIDE 50 MG/ML
50 INJECTION INTRAMUSCULAR; INTRAVENOUS
Start: 2024-01-24

## 2024-01-24 RX ORDER — METHYLPREDNISOLONE SODIUM SUCCINATE 125 MG/2ML
125 INJECTION, POWDER, LYOPHILIZED, FOR SOLUTION INTRAMUSCULAR; INTRAVENOUS
Start: 2024-01-24

## 2024-01-24 RX ORDER — ALBUTEROL SULFATE 90 UG/1
1-2 AEROSOL, METERED RESPIRATORY (INHALATION)
Start: 2024-01-24

## 2024-01-24 RX ORDER — EPINEPHRINE 1 MG/ML
0.3 INJECTION, SOLUTION, CONCENTRATE INTRAVENOUS EVERY 5 MIN PRN
OUTPATIENT
Start: 2024-01-24

## 2024-01-24 RX ORDER — ALBUTEROL SULFATE 0.83 MG/ML
2.5 SOLUTION RESPIRATORY (INHALATION)
OUTPATIENT
Start: 2024-01-24

## 2024-01-24 NOTE — TELEPHONE ENCOUNTER
RV:  4/10/24  LV: 1/10/24    New Therapy Plan:  Stelera 390mg induction dose  Order Standing Labs: CBC, CRP, Hep Panel  Order TB/Hepatitis B series:  ordered  Order MTM referral:  Completed 1/23/24    Home Infusion/[facility]: [facility]    Outside facility: Outside do their own PA  Facility: [facility]  (O) [phone]  Infusion Center  [phone]  (F) [phone]    Hello Team,  Please fax Therapy Plan to outside infusion facility.    Please confirm receipt of the fax.    Thank you.  Nichole

## 2024-01-24 NOTE — TELEPHONE ENCOUNTER
IV Induction Adult Dose Regimen:  Body Weight (at time of dosing)  Dose  55 kg or less     260 mg  more than 55 kg to 85 kg   390 mg  more than 85 kg    520 mg   ------------------------------------  Spoke with Poonam, pt state her Wt: 165#s = 75 kg. --> 390mg     Pt ask about the tacrolimus. Pt concern if it is covered. Ask pt to call  compound pharmacy to check with her new insurance and let us know.     Discuss with Poonam, therapy plan will be faxed to [facility] and once PA has been obtain they will call her to schedule her induction dose.     Discuss the labs, pt aware we need fasting lipids. Ask pt to reschedule lab appt for am so that she can fast before getting the labs completed.

## 2024-01-25 NOTE — TELEPHONE ENCOUNTER
Summary: Faxed Continuity of Care       Faxed documents per request.     Faxed:  - Therapy Plan- ustekinumab    Facility Information:  [facility] at [phone].    [facility] to confirm they received therapy plan @ [phone] 1/25/2024    Mildred Hernandez LPN

## 2024-01-26 ENCOUNTER — TELEPHONE (OUTPATIENT)
Dept: GASTROENTEROLOGY | Facility: CLINIC | Age: 29
End: 2024-01-26
Payer: COMMERCIAL

## 2024-01-26 DIAGNOSIS — K51.00 ULCERATIVE PANCOLITIS (H): Primary | ICD-10-CM

## 2024-01-26 RX ORDER — AZATHIOPRINE 50 MG/1
150 TABLET ORAL DAILY
Qty: 90 TABLET | Refills: 2 | Status: SHIPPED | OUTPATIENT
Start: 2024-01-26 | End: 2024-05-03

## 2024-01-26 NOTE — TELEPHONE ENCOUNTER
Inappropriate to send prescription at this time. Patient still in Stelara IV induction. Closing encounter.    Suhail Chavarria PharmD, BCPS  MTM Pharmacist   [facility]  Phone: [phone]

## 2024-01-26 NOTE — TELEPHONE ENCOUNTER
[facility] Call Center    Phone Message    May a detailed message be left on voicemail: yes     Reason for Call: Other: Pt's pharmacy is calling looking to request a new prescription for the Pt's stelara. Please respond accordingly.     Action Taken: Message routed to:  [facility] GI    Travel Screening: Not Applicable

## 2024-01-26 NOTE — TELEPHONE ENCOUNTER
"Order for azathioprine sent with this encounter.    Last provider visit: 1/10/24  Next provider visit: 4/10/24  Last labs completed: 1/25/24  Lab frequency: per Aza protocol   - standing labs yes, faxed to [facility]  Next labs due: Week 2, 4, 8, 12, 16 then every 3 months (hepatic, CBC with platelets/diff, CRP)      Per Camilo Rodriguez PA-C \"Ok to start  without TPMT with normal in 2017 and therapeutic levels previously (but would do the monitoring labs frequently up front then q3 months) \"      "

## 2024-01-29 NOTE — PATIENT INSTRUCTIONS
"Recommendations from today's MTM visit:                                                      Start tacrolimus 1.5 mg rectal ointment. The prescription is at [facility]. This will help with your current GI symptoms.  Please complete lab work as soon as possible. These have been faxed to [facility].  You will be due for additional lab monitoring once you start azathioprine at weeks 2, 4, 8, 12 and 16 followed by every 3 months.   Reminder to keep your appointment in March with dermatology for routine skin check.   I will let the nurse coordinators know you are interested in injection training as you transition to [facility].    Follow-up: 1/31/24    It was great speaking with you today.  I value your experience and would be very thankful for your time in providing feedback in our clinic survey. In the next few days, you may receive an email or text message from [employer] with a link to a survey related to your  clinical pharmacist.\"     To schedule another MTM appointment, please call the clinic directly or you may call the MTM scheduling line at [phone] or toll-free at [phone].     My Clinical Pharmacist's contact information:                                                      Please feel free to contact me with any questions or concerns you have.      Javon Chavarria, PharmD, BCPS  MTM Pharmacist   [facility]  Phone: [phone]   "

## 2024-01-31 ENCOUNTER — TELEPHONE (OUTPATIENT)
Dept: GASTROENTEROLOGY | Facility: CLINIC | Age: 29
End: 2024-01-31
Payer: COMMERCIAL

## 2024-01-31 ENCOUNTER — VIRTUAL VISIT (OUTPATIENT)
Dept: GASTROENTEROLOGY | Facility: CLINIC | Age: 29
End: 2024-01-31
Attending: PHYSICIAN ASSISTANT
Payer: COMMERCIAL

## 2024-01-31 DIAGNOSIS — K51.00 ULCERATIVE PANCOLITIS (H): Primary | ICD-10-CM

## 2024-01-31 NOTE — PROGRESS NOTES
Medication Therapy Management (MTM) Encounter    ASSESSMENT:                            Medication Adherence/Access: No issues identified    Ulcerative Colitis:  Poonam would benefit from starting azathioprine. She can start at Azathioprine 150 mg (1.9 mg/kg/day) once daily per verbal agreement with Camilo Rodriguez PA-C, with standard lab monitoring up front. Recommend hepatic panel, CBC with platelets & diff and CRP at weeks 2, 4, 8, 12 and 16. Standing orders have previously been faxed to [place]. She has previously been on Azathioprine however side effects and monitoring were reviewed with this visit. She has not heard status update on Stelara prior authorization with [place], recommend that she contact them for update. She has tacrolimus available but has not yet started it. Advised her to start this as soon as possible. Health maintenance was not comprehensively reviewed with this visit. She is up to date on routine maintenance labs including annual tuberculosis screening.    PLAN:                            Start azathioprine 150 mg once daily. You will be due for additional lab monitoring at weeks 2, 4, 8, 12 and 16. We have already faxed standing orders to [place] for these labs. They are called hepatic panel, CBC with platelets & diff and CRP.   Please check with [place] on status update for Stelara. Let us know once that is approved and scheduled.  Start tacrolimus ointment.    [facility] MTM visit in 3 month    SUBJECTIVE/OBJECTIVE:                          Poonam Berg is a 28 year old female called for a follow-up visit from 1/23/24.       Reason for visit: Tacrolimus, stelara, Azathioprine.    Allergies/ADRs: Reviewed in chart  Past Medical History: Reviewed in chart  Tobacco: She reports that she has never smoked. She has never used smokeless tobacco.  Alcohol: Less than 1 beverages / week      Medication Adherence/Access: no issues reported    Ulcerative Colitis:   Stelara  (planning to start, PA pending)  Azathioprine (planning to start, already on-hand)  Tacrolimus rectal ointment (planning to start, already on-hand)    Tacrolimus suppository has been delivered but she hasn't started it yet. Plans to start it ASAP.    She hasn't heard from [facility] about Stelara prior authorization.     She has picked up the Azathioprine and has it on hand, ready to start.       Last provider visit: 1/10/24  Next provider visit: 4/10/24  Last labs completed: 1/25/24, in [facility]  -Hepatic panel: 1/25/24  -CBC with platelets/diff: 1/25/24  -CRP: 1/25/24  Lab frequency: with standard azathioprine              - standing labs hepatic panel, CBC with platelets & diff, CRP on file at [facility]  Next labs due: with azathioprine standard monitoring  Last IBD Health Maintenance Review: not done    Pre-biologic screening:  Hepatitis B surface antibody: serology indicates immunity 2017  Hepatitis B surface antigen: nonreactive 12/3/2016  Hepatitis B core antibody: nonreactive 12/3/2016  Quantiferon-Tb: Negative, 1/25/24    Thiopurine methyltransferase (resulted 12/3/2016 found in [facility])- 28.6 U/mL (normal)        6-TGN/6-MMPN levels (on Aza 150 mg once daily)  7/15/20: 6-TGN: 137, 6MMPN 3888     ----------------      I spent 20 minutes with this patient today. All changes were made via collaborative practice agreement with Camilo Rodriguez PA-C. A copy of the visit note was provided to the patient's provider(s).    A summary of these recommendations was sent via [employer].    Javon Chavarria, PharmD, BCPS  MTM Pharmacist   [facility]  Phone: [phone]    Telemedicine Visit Details  Type of service:  Telephone visit  Start Time:  3:30 PM  End Time:  3:50 PM     Medication Therapy Recommendations  No medication therapy recommendations to display

## 2024-01-31 NOTE — Clinical Note
"    1/31/2024         RE: Poonam Berg  [address] [facility] [address]        Dear Colleague,    Thank you for referring your patient, Poonam Berg, to [facility]. Please see a copy of my visit note below.    Medication Therapy Management (MTM) Encounter    ASSESSMENT:                            Medication Adherence/Access: {adherencechoices:771494}    Ulcerative Colitis:  ***      PLAN:                            Baseline lab work.   You are good to go on starting Imuran.   Week 2, 4, 8, 12, and 16.   [facility] Singh prior authorization in progress.     Follow-up: {followuptest2:630695}    SUBJECTIVE/OBJECTIVE:                          Poonam Berg is a 28 year old female { :942791} for {mtmvisit:022057}     Reason for visit: ***.    Allergies/ADRs: {1/2/3/4/5:433731}  Past Medical History: {1/2/3/4/5:724222}  Tobacco: She reports that she has never smoked. She has never used smokeless tobacco.  Alcohol: {ALCOHOL CONSUMPTION HX:378098}  {Social and Goals:148566}    Medication Adherence/Access: {fumedadherence:968834}    Ulcerative Colitis:     Tacrolimus- arrived, will be out of town until tomorrow.     Did not pick it up Azathioprine-          Today's Vitals: There were no vitals taken for this visit.  ----------------  {MANNIE?:305881}    I spent {mtm total time 3:115814} with this patient today. { :730139}. A copy of the visit note was provided to the patient's provider(s).    A summary of these recommendations {GIVEN/NOT GIVEN:358082}.    ***    Telemedicine Visit Details  Type of service:  {telemedvisitmtm:434893::\"Telephone visit\"}  Start Time: {video/phone visit start time:152948}  End Time: {video/phone visit end time:152948}     Medication Therapy Recommendations  No medication therapy recommendations to display       Medication Therapy Management (MTM) Encounter    ASSESSMENT:                            Medication Adherence/Access: No issues identified    Ulcerative " Colitis:  Poonam would benefit from starting azathioprine. She can start at Azathioprine 150 mg (1.9 mg/kg/day) once daily per verbal agreement with Camilo Rodriguez PA-C, with standard lab monitoring up front. Recommend hepatic panel, CBC with platelets & diff and CRP at weeks 2, 4, 8, 12 and 16. Standing orders have previously been faxed to [facility]. She has previously been on Azathioprine however side effects and monitoring were reviewed with this visit. She has not heard status update on Stelara prior authorization with [facility], recommend that she contact them for update. She has tacrolimus available but has not yet started it. Advised her to start this as soon as possible. Health maintenance was not comprehensively reviewed with this visit. She is up to date on routine maintenance labs including annual tuberculosis screening.    PLAN:                            Start azathioprine 150 mg once daily. You will be due for additional lab monitoring at weeks 2, 4, 8, 12 and 16. We have already faxed standing orders to [facility] for these labs. They are called hepatic panel, CBC with platelets & diff and CRP.   Please check with [facility] on status update for Stelara. Let us know once that is approved and scheduled.  Start tacrolimus ointment.    [facility] MTM visit in 3 month    SUBJECTIVE/OBJECTIVE:                          Poonam Berg is a 28 year old female called for a follow-up visit from 1/23/24.       Reason for visit: Tacrolimus, stelara, Azathioprine.    Allergies/ADRs: Reviewed in chart  Past Medical History: Reviewed in chart  Tobacco: She reports that she has never smoked. She has never used smokeless tobacco.  Alcohol: Less than 1 beverages / week      Medication Adherence/Access: no issues reported    Ulcerative Colitis:     Tacrolimus suppository has been delivered but she hasn't started it yet. Plans to start it ASAP.    She hasn't heard from [facility] about Stelara prior authorization.      She has picked up the Azathioprine and has it on hand, ready to start.       Last provider visit: 1/10/24  Next provider visit: 4/10/24  Last labs completed: 1/25/24, in [facility]  -Hepatic panel: 1/25/24  -CBC with platelets/diff: 1/25/24  -CRP: 1/25/24  Lab frequency: with standard azathioprine              - standing labs hepatic panel, CBC with platelets & diff, CRP on file at [facility]  Next labs due: with azathioprine standard monitoring  Last IBD Health Maintenance Review: not done    Pre-biologic screening:  Hepatitis B surface antibody: serology indicates immunity 2017  Hepatitis B surface antigen: nonreactive 12/3/2016  Hepatitis B core antibody: nonreactive 12/3/2016  Quantiferon-Tb: Negative, 1/25/24    Thiopurine methyltransferase (resulted 12/3/2016 found in [facility])- 28.6 U/mL (normal)        6-TGN/6-MMPN levels (on Aza 150 mg once daily)  7/15/20: 6-TGN: 137, 6MMPN 3888     ----------------      I spent 20 minutes with this patient today. All changes were made via collaborative practice agreement with Camilo Rodriguez PA-C. A copy of the visit note was provided to the patient's provider(s).    A summary of these recommendations was sent via [employer].    Javon Chavarria PharmD, BCPS  MTM Pharmacist   [facility]  Phone: [phone]    Telemedicine Visit Details  Type of service:  Telephone visit  Start Time:  3:30 PM  End Time:  3:50 PM     Medication Therapy Recommendations  No medication therapy recommendations to display         Again, thank you for allowing me to participate in the care of your patient.        Sincerely,        Javon Chavarria RP

## 2024-01-31 NOTE — TELEPHONE ENCOUNTER
[facility] Call Center    Phone Message    May a detailed message be left on voicemail: yes     Reason for Call: Other: Received a referral for Stelara for pt, but did not receive actual prescription, requesting the actual prescription please verify dose and strength and also if vials or syringes. Please fax to 1 [phone]     Action Taken: Other: csc gi    Travel Screening: Not Applicable

## 2024-01-31 NOTE — TELEPHONE ENCOUNTER
Spoke with Yuridia - pharmacy tech at [facility], reiterated that pt is still in the Stelara IV phase and until this occur a new prescription is not appropriate at this time.    Yuridia agree to plan and will note in pt's chart that will wait for provider to send Rx for new start of the Stelara subcutaneous.

## 2024-02-01 ENCOUNTER — TELEPHONE (OUTPATIENT)
Dept: GASTROENTEROLOGY | Facility: CLINIC | Age: 29
End: 2024-02-01
Payer: COMMERCIAL

## 2024-02-01 RX ORDER — ALBUTEROL SULFATE 0.83 MG/ML
2.5 SOLUTION RESPIRATORY (INHALATION)
OUTPATIENT
Start: 2024-02-01

## 2024-02-01 RX ORDER — EPINEPHRINE 1 MG/ML
0.3 INJECTION, SOLUTION, CONCENTRATE INTRAVENOUS EVERY 5 MIN PRN
OUTPATIENT
Start: 2024-02-01

## 2024-02-01 RX ORDER — ALBUTEROL SULFATE 90 UG/1
1-2 AEROSOL, METERED RESPIRATORY (INHALATION)
Start: 2024-02-01

## 2024-02-01 RX ORDER — METHYLPREDNISOLONE SODIUM SUCCINATE 125 MG/2ML
125 INJECTION, POWDER, LYOPHILIZED, FOR SOLUTION INTRAMUSCULAR; INTRAVENOUS
Start: 2024-02-01

## 2024-02-01 RX ORDER — MEPERIDINE HYDROCHLORIDE 25 MG/ML
25 INJECTION INTRAMUSCULAR; INTRAVENOUS; SUBCUTANEOUS EVERY 30 MIN PRN
OUTPATIENT
Start: 2024-02-01

## 2024-02-01 RX ORDER — DIPHENHYDRAMINE HYDROCHLORIDE 50 MG/ML
50 INJECTION INTRAMUSCULAR; INTRAVENOUS
Start: 2024-02-01

## 2024-02-01 NOTE — PATIENT INSTRUCTIONS
"Recommendations from today's MTM visit:                                                      Start azathioprine 150 mg once daily. You will be due for additional lab monitoring at weeks 2, 4, 8, 12 and 16. We have already faxed standing orders to [facility] for these labs. They are called hepatic panel, CBC with platelets & diff and CRP.   Please check with [facility] on status update for Andersonlara. Let us know once that is approved and scheduled.  Start tacrolimus ointment.    Follow-up: 2 week check-in ([facility]); MTM visit in 3 month    It was great speaking with you today.  I value your experience and would be very thankful for your time in providing feedback in our clinic survey. In the next few days, you may receive an email or text message from [facility] [employer] with a link to a survey related to your  clinical pharmacist.\"     To schedule another MTM appointment, please call the clinic directly or you may call the MTM scheduling line at [phone] or toll-free at [phone].     My Clinical Pharmacist's contact information:                                                      Please feel free to contact me with any questions or concerns you have.      Javon Chavarria, PharmD, BCPS  MTM Pharmacist   [facility]  Phone: [phone]   "

## 2024-02-01 NOTE — TELEPHONE ENCOUNTER
Order Venofer Therapy plan.    Critical access hospital Team,  Please fax Venofer Therapy plan to     [facility]  (O) [phone]  (F) [phone]    Thank you.  Nichole

## 2024-02-01 NOTE — TELEPHONE ENCOUNTER
Spoke with oPonam and relayed MD message/recommendations regarding venofer infusion and low iron.  Patient acknowledged understanding and agree with plan. Answer all questions offered contact information for pt to call with any questions or concerns.

## 2024-02-01 NOTE — TELEPHONE ENCOUNTER
February 1, 2024    Called Poonam Left message and contact info to return call regarding: venofer infusion

## 2024-02-01 NOTE — TELEPHONE ENCOUNTER
----- Message from Tim Mcgregor MD sent at 2/1/2024  9:13 AM CST -----  Regarding: RE: more anemic - needs iron infusions  Thanks for the update.    Yes that dose of venofer is good thanks    ----- Message -----  From: Nichole Adair RN  Sent: 2/1/2024   9:08 AM CST  To: Nichole Adair RN; Jo Elizalde RN; #  Subject: RE: more anemic - needs iron infusions           Per Suhail URIBE, she will be starting her Aza tomorrow.  We are still waiting for infusion induction Samantha BASILIO to get approved at [facility].    I can reach out to pt regarding iron infusions.    Please advice if Venofer 200 mg x5 is recommended.    Thank you.  Nichole    ----- Message -----  From: Jo Elizalde RN  Sent: 1/31/2024   2:25 PM CST  To: Nichole Adair RN; #  Subject: RE: more anemic - needs iron infusions           Hey team, this is actually a [facility] patient, so I'm not exactly sure where things are at-- looping in [place]!     I am happy to help with the iron infusions if needed--Nichole did you want to take care of those, or would you like me to do it?    Nichole-- has Poonam started the Azathioprine and Stelara yet?    Thanks!  Suhail   ----- Message -----  From: Tim Mcgregor MD  Sent: 1/31/2024  11:52 AM CST  To: Jo Elizalde RN; Camilo Rodriguez PA-C  Subject: more anemic - needs iron infusions               Hi Camilo and Suhail PETER,    Poonam's labs came back. She is much more anemia again - hgb down to 9.    Suhail PETER, can you check in with patient and help get her iron infusions.    Also, I know we are just starting azathioprine and Stelara (that was place at the most recent visit with Camilo MAY) which is a good one. Has she started these meds yet?    I know we are trying to avoid steroids but may need something if therapy is delayed.    Thanks!  HIREN

## 2024-02-01 NOTE — TELEPHONE ENCOUNTER
Spoke with Maya at [facility] Maya states they did not received the therapy plan for Stelara. She notes that a call was made to our team to let us know. She did not have the name of who this information was relay to.    Hello Team,  Please fax Stelara Therapy plan to  [facility]  O) [phone]  [facility]  [phone]  (F) [phone]    Thank you.  Nichole

## 2024-02-02 NOTE — TELEPHONE ENCOUNTER
Stelara and Venofer therapy plans faxed to:    Please fax Stelara Therapy plan to  [facility]  (O) [phone]  [facility]  [phone]  (F) [phone]    Mildred Hernandez LPN

## 2024-02-06 ENCOUNTER — MYC MEDICAL ADVICE (OUTPATIENT)
Dept: GASTROENTEROLOGY | Facility: CLINIC | Age: 29
End: 2024-02-06
Payer: COMMERCIAL

## 2024-02-06 DIAGNOSIS — K51.00 ULCERATIVE PANCOLITIS (H): Primary | ICD-10-CM

## 2024-02-06 NOTE — TELEPHONE ENCOUNTER
Spoke with Karen at [facility], karen stated pt is all set. Pt started her Venofer infusion already. They are waiting for the Stelara medication. Once that is received they will schedule pt for her induction dose.    Sent MyC message to pt with request for infusion completion

## 2024-02-14 ENCOUNTER — PATIENT OUTREACH (OUTPATIENT)
Dept: GASTROENTEROLOGY | Facility: CLINIC | Age: 29
End: 2024-02-14

## 2024-02-14 DIAGNOSIS — K51.00 ULCERATIVE PANCOLITIS (H): Primary | ICD-10-CM

## 2024-02-14 RX ORDER — USTEKINUMAB 90 MG/ML
INJECTION, SOLUTION SUBCUTANEOUS
Qty: 1 ML | Refills: 5 | Status: SHIPPED | OUTPATIENT
Start: 2024-02-14

## 2024-02-14 NOTE — PROGRESS NOTES
Order Stelara subcutaneous. First subcutaneous due on 4/5/24.    Hello Team,  MARCIAL,  Order the subcutaneous. Spoke with Poonam. Will plan to have virtual RN visit on 4/5/24.    Pt completed Stelara induction dose on 2/9/24.    Thank you.  Nichole

## 2024-02-14 NOTE — TELEPHONE ENCOUNTER
Spoke with Poonam, pt confirm completion of Stelara induction dose on 2/9/24, careeverywhere confirm completed at [facility].    Pt's first Stelara maintenance dose will be 4/5/24. Pt will check with [facility] if not already call around 3/18/24 for delivery and schedule of medication. Discuss with pt  stelara first inj and education. Pt lives far and will plan to do a virtual RN visit.

## 2024-02-22 ENCOUNTER — TELEPHONE (OUTPATIENT)
Dept: GASTROENTEROLOGY | Facility: CLINIC | Age: 29
End: 2024-02-22
Payer: COMMERCIAL

## 2024-02-22 NOTE — TELEPHONE ENCOUNTER
Attempted to contact patient to discuss Azathioprine monitoring. Based on recent lab work, patient is currently up to date on azathioprine lab monitoring. However would like to know what date she started so we can proactively track. I suspect she started 1/31/24. Left message with call back number.    Suhail Chavarria, PharmD, BCPS  MTM Pharmacist   [facility]  Phone: [phone]

## 2024-03-12 ENCOUNTER — TELEPHONE (OUTPATIENT)
Dept: GASTROENTEROLOGY | Facility: CLINIC | Age: 29
End: 2024-03-12
Payer: COMMERCIAL

## 2024-03-25 NOTE — TELEPHONE ENCOUNTER
Received call back from Poonam. She has Stelara injection in her possession, I will reach out to Nichole to finalize plans for virtual injection training on 4/5. Started azathioprine 1/31/24. Lab appointment for tomorrow 3/26. She did not complete week 4 labs, denies side effects or concerns. Has tacrolimus ointment on hand, never started. No current GI symptoms.     Azathioprine monitoring schedule:  Start date: 1/31/24  Week 2: 2/14  Week 4: not done  Week 8: 3/26/24 (scheduled)  Week 12: April 24th  Week 16: May 22nd    Javon Chavarria, PharmD, BCPS  MTM Pharmacist   [facility]  Phone: [phone]

## 2024-03-28 NOTE — TELEPHONE ENCOUNTER
March 28, 2024    Called Poonam Left message and contact info to return call regarding: RN visit for 4/5/24

## 2024-04-01 NOTE — TELEPHONE ENCOUNTER
Received VM, Poonam is available for Nurse visit on 4/4/24 at 3:30pm.    Schedule Nurse Visit and sent p MyC message with above info.    April 1, 2024    Called Poonam Left message and contact info to return call regarding: Nurse visit per above

## 2024-04-04 ENCOUNTER — VIRTUAL VISIT (OUTPATIENT)
Dept: GASTROENTEROLOGY | Facility: CLINIC | Age: 29
End: 2024-04-04
Payer: COMMERCIAL

## 2024-04-04 DIAGNOSIS — K51.00 ULCERATIVE PANCOLITIS (H): Primary | ICD-10-CM

## 2024-04-04 ASSESSMENT — PAIN SCALES - GENERAL: PAINLEVEL: NO PAIN (0)

## 2024-04-04 NOTE — NURSING NOTE
Is the patient currently in the state of [place]? YES    Visit mode:VIDEO    If the visit is dropped, the patient can be reconnected by: VIDEO VISIT: Text to cell phone:   Telephone Information:   Mobile [phone]       Will anyone else be joining the visit? NO  (If patient encounters technical issues they should call [phone] :[phone])    How would you like to obtain your AVS? MyChart    Are changes needed to the allergy or medication list? No changes to allergies, PT no longer using CMPD RX ointment.      Reason for visit: RECHECK    Sheron CHAVESF

## 2024-04-19 ENCOUNTER — TELEPHONE (OUTPATIENT)
Dept: GASTROENTEROLOGY | Facility: CLINIC | Age: 29
End: 2024-04-19
Payer: COMMERCIAL

## 2024-04-19 NOTE — TELEPHONE ENCOUNTER
Attempted to contact Poonam. Unable to reach, left message with call back number. Separate Penana message sent with additional information.    Janessa PérezD, BCPS  MTM Pharmacist   [facility]  Phone: [phone]

## 2024-04-26 ENCOUNTER — VIRTUAL VISIT (OUTPATIENT)
Dept: GASTROENTEROLOGY | Facility: CLINIC | Age: 29
End: 2024-04-26
Attending: PHYSICIAN ASSISTANT
Payer: COMMERCIAL

## 2024-04-26 DIAGNOSIS — K51.00 ULCERATIVE PANCOLITIS (H): Primary | ICD-10-CM

## 2024-04-26 NOTE — PROGRESS NOTES
Medication Therapy Management (MTM) Encounter    ASSESSMENT:                            Medication Adherence/Access: No issues identified    Ulcerative Colitis:  Poonam would benefit from continued treatment with Stelara 90 mg every 8 weeks. They are up to date on routine maintenance labs. They are up to date on annual tuberculosis screening. They are indicated for additional lab work including thiopurine metabolites, routine hepatitis C screening. No access issues for their advanced therapy are present. They are indicated for a few vaccinations which were recommended to them. She is getting adequate calcium from her diet. Recommend continuing vitamin D 1000 units daily. Reminders for routine cancer screening were provided.     PLAN:                            You are up to date on labs. Your next lab work is due 5/22. I will add additional labs including a Hepatitis C screening and thiopurine metabolites.  Poonam will consider the following vaccines:  Annual flu shot  Updated COVID-19 vaccine  Pneumococcal pneumonia (Prevnar-20)  Shingles (Shingrix 2-dose series)  Continue vitamin D 1000 units.   Reminder to schedule a routine Pap test.     Follow-up: 10/28/2024 at 9:00 AM    SUBJECTIVE/OBJECTIVE:                          Poonam Berg is a 28 year old female called for a follow-up visit.       Reason for visit: Stelara + Aza follow-up visit.    Allergies/ADRs: Reviewed in chart  Past Medical History: Reviewed in chart  Tobacco: She reports that she has never smoked. She has never used smokeless tobacco.  Alcohol: Less than 1 beverages / week      Medication Adherence/Access: no issues reported    Ulcerative Colitis:   Stelara 90 mg every 8 weeks  Azathioprine 150 mg once daily  Compounded tacrolimus 3 g rectally twice daily    Denies side effects or injection site reactions. Lipid panel removed from standing labs; this was recommended in setting of extended Rinvoq induction and is no longer indicated now that she is on  Stelara.    GI symptoms:  No abdominal pain  2-3 bowel movements per day  No blood    Prior authorization status: Stelara 1/56 approved through 1/17/2025  Original start date: February 2024  Last dose: 4/4/2024  Next dose: 5/30/2024    Last provider visit: 1/10/2024 Jennifer  Next provider visit: 6/26/2024 Jennifer  Last labs completed: 4/24/2024  Lab frequency: per azathioprine monitoring schedule, then every 3 months   - standing labs yes, CRP, hepatic, CBC with platelets & diff, lipid panel (removed with this encounter)  Next labs due: 5/22/2024    Stelara initiation:  Week 0 (IV infusion): 2/9/2024  Week 8 (first subcutaneous): 4/4/2024    Azathioprine monitoring schedule:   Start date: 1/31/24   Week 2: 2/14   Week 4: not done   Week 8: 3/26/24 (done  Week 12: April 24th - Patient reports labs done on Wednesday   Week 16: May 22nd     IBD Health Maintenance    Vaccinations:  All patients on biologics should avoid live vaccines unless specifically indicated.    -- Influenza (every year)- not on file  -- TdaP (every 10 years)- last 2021  -- Pneumococcal Pneumonia    Prevnar-13: not on file   Pneumovax-23: 97/2022, 4/4/2017   Prevnar-20:  -- COVID-19- 1/10/2022      One time confirmation of immunity or serologies:  -- Hepatitis A (serologies or immunizations) 4/2014, 8/2013  -- Hepatitis B (serologies or immunizations)- serology indicates immunity 2017  -- Varicella/Zoster               Varicella- 11/4/2010, 10/29/1998              Zoster- not on file  -- MMR- 4/25/2003, 10/29/1998  -- HPV (all aged 18-26)- not on file    Due to the immunosuppression in this patient, I would not advise administration of live vaccines such as varicella/VZV, intranasal influenza, MMR, or yellow fever vaccine (if traveling).      Immunosuppressive Screening:  -- Hepatitis B surface antibody: serology indicates immunity 2017  -- Hepatitis B surface antigen: nonreactive 12/3/2016  -- Hepatitis B core antibody: nonreactive 12/3/2016   -- Hep  C Antibody not done   -- Yearly assessment of TB negative 1/25/2024    Bone mineral density screening   -- Recommend all patients supplement with calcium and vitamin D  -- Last DEXA 2018 normal  -- Reports 3 servings of dairy/day     Cancer Screening:  Colon cancer screening:  Given pancolitis, recommend patient undergo regular dysplasia surveillance   Last flex sig 11/2023    Cervical cancer screening: Annual due to immunosupression- last 9/7/2022    Skin cancer screening: Annual visual exam of skin by dermatologist since patient is immunocompromised- last 3/20/2023, scheduled 10/9/2024     Depression Screening:    PHQ-2 Score:         4/4/2024     3:21 PM 1/10/2024     9:56 AM   PHQ-2 ( 1999 [employer])   Q1: Little interest or pleasure in doing things 0 0   Q2: Feeling down, depressed or hopeless 0 0   PHQ-2 Score 0 0       Research:  Are you interested in being contacted about enrollment in clinical research studies? Yes    Would you like to receive a quarterly newsletter on research via email. YES [email or web address]       Misc:  -- Avoid tobacco use  -- Avoid NSAIDs as there is potentially a 25% chance of causing an IBD flare      ----------------      I spent 26 minutes with this patient today. All changes were made via collaborative practice agreement with Camilo Rodriugez PA-C. A copy of the visit note was provided to the patient's provider(s).    A summary of these recommendations was sent via [employer].    Janessa BenitesD, BCPS  MTM Pharmacist   [facility]  Phone: [phone]    Telemedicine Visit Details  Type of service:  Telephone visit  Start Time:  9:00 AM  End Time:  9:26 AM     Medication Therapy Recommendations  No medication therapy recommendations to display

## 2024-04-26 NOTE — Clinical Note
4/26/2024         RE: Poonam Berg  [address] [facility] [address]        Dear Colleague,    Thank you for referring your patient, Poonam Berg, to the [facility]. Please see a copy of my visit note below.    Medication Therapy Management (MTM) Encounter    ASSESSMENT:                            Medication Adherence/Access: {adherencechoices:538737}    Ulcerative Colitis:  ***      PLAN:                            You are due for labs now. Please have these completed at your soonest convenience. I will add additional labs including a Hepatitis C screening and thiopurine metabolites.  Poonam will consider the following vaccines:  Annual flu shot  Updated COVID-19 vaccine  Pneumococcal pneumonia (Prevnar-20)  Shingles (Shingrix 2-dose series)  Continue vitamin D 1000 units.   Reminder to schedule a routine Pap test.     Follow-up: {followuptest2:364171}    SUBJECTIVE/OBJECTIVE:                          Poonam Berg is a 28 year old female { :386291} for {mtmvisit:499209}     Reason for visit: Stelara + Aza follow-up visit.    Allergies/ADRs: Reviewed in chart  Past Medical History: Reviewed in chart  Tobacco: She reports that she has never smoked. She has never used smokeless tobacco.  Alcohol: Less than 1 beverages / week      Medication Adherence/Access: {fumedadherence:572188}    Ulcerative Colitis:   Stelara 90 mg every 8 weeks  Azathioprine 150 mg once daily  Compounded tacrolimus 3 g rectally twice daily    Denies side effects or injection site reactions. She reports her lipid panel was elevated.     GI symptoms:  No abdominal pain  2-3 bowel movements per day  No blood    Prior authorization status: Stelara 1/56 approved through 1/17/2025  Original start date: February 2024  Last dose: 4/4/2024  Next dose: 5/30/2024    Stelara initiation:  Week 0 (IV infusion): 2/9/2024  Week 8 (first subcutaneous): 4/4/2024    Azathioprine monitoring schedule:   Start date: 1/31/24   Week 2:  2/14   Week 4: not done   Week 8: 3/26/24 (done  Week 12: April 24th - Patient reports labs done on Wednesday   Week 16: May 22nd     IBD Health Maintenance    Vaccinations:  All patients on biologics should avoid live vaccines unless specifically indicated.    -- Influenza (every year)- not on file  -- TdaP (every 10 years)- last 2021  -- Pneumococcal Pneumonia    Prevnar-13: not on file   Pneumovax-23: 97/2022, 4/4/2017   Prevnar-20:  -- COVID-19- 1/10/2022      One time confirmation of immunity or serologies:  -- Hepatitis A (serologies or immunizations) 4/2014, 8/2013  -- Hepatitis B (serologies or immunizations)- serology indicates immunity 2017  -- Varicella/Zoster               Varicella- 11/4/2010, 10/29/1998              Zoster- not on file  -- MMR- 4/25/2003, 10/29/1998  -- HPV (all aged 18-26)- not on file    Due to the immunosuppression in this patient, I would not advise administration of live vaccines such as varicella/VZV, intranasal influenza, MMR, or yellow fever vaccine (if traveling).      Immunosuppressive Screening:  -- Hepatitis B surface antibody: serology indicates immunity 2017  -- Hepatitis B surface antigen: nonreactive 12/3/2016  -- Hepatitis B core antibody: nonreactive 12/3/2016   -- Hep C Antibody not done   -- Yearly assessment of TB negative 1/25/2024    Bone mineral density screening   -- Recommend all patients supplement with calcium and vitamin D  -- Last DEXA 2018 normal  -- Reports 3 servings of dairy/day     Cancer Screening:  Colon cancer screening:  Given ***, recommend patient undergo regular dysplasia surveillance   Next dysplasia screening is recommended ***.    Cervical cancer screening: Annual due to immunosupression- last 9/7/2022    Skin cancer screening: Annual visual exam of skin by dermatologist since patient is immunocompromised- last 3/20/2023, scheduled 10/9/2024     Depression Screening:    PHQ-2 Score:         4/4/2024     3:21 PM 1/10/2024     9:56 AM   PHQ-2  "( 1999 Pfizer)   Q1: Little interest or pleasure in doing things 0 0   Q2: Feeling down, depressed or hopeless 0 0   PHQ-2 Score 0 0       Research:  Are you interested in being contacted about enrollment in clinical research studies? Yes    Would you like to receive a quarterly newsletter on research via email. YES [email or web address]       Misc:  -- Avoid tobacco use  -- Avoid NSAIDs as there is potentially a 25% chance of causing an IBD flare              Today's Vitals: There were no vitals taken for this visit.  ----------------  {MANNIE?:163574}    I spent {mtm total time 3:857281} with this patient today. { :510131}. A copy of the visit note was provided to the patient's provider(s).    A summary of these recommendations {GIVEN/NOT GIVEN:511645}.    ***    Telemedicine Visit Details  Type of service:  {telemedvisitmtm:718293::\"Telephone visit\"}  Start Time: {video/phone visit start time:152948}  End Time: {video/phone visit end time:152948}     Medication Therapy Recommendations  No medication therapy recommendations to display       Medication Therapy Management (MTM) Encounter    ASSESSMENT:                            Medication Adherence/Access: No issues identified    Ulcerative Colitis:  Poonam would benefit from continued treatment with Stelara 90 mg every 8 weeks. They are up to date on routine maintenance labs. They are up to date on annual tuberculosis screening. They are indicated for additional lab work including thiopurine metabolites, routine hepatitis C screening. No access issues for their advanced therapy are present. They are indicated for a few vaccinations which were recommended to them. She is getting adequate calcium from her diet. Recommend continuing vitamin D 1000 units daily. Reminders for routine cancer screening were provided.     PLAN:                            You are up to date on labs. Your next lab work is due 5/22. I will add additional labs including a Hepatitis C screening " and thiopurine metabolites.  Poonam will consider the following vaccines:  Annual flu shot  Updated COVID-19 vaccine  Pneumococcal pneumonia (Prevnar-20)  Shingles (Shingrix 2-dose series)  Continue vitamin D 1000 units.   Reminder to schedule a routine Pap test.     Follow-up: 10/28/2024 at 9:00 AM    SUBJECTIVE/OBJECTIVE:                          Poonam Berg is a 28 year old female called for a follow-up visit.       Reason for visit: Stelara + Aza follow-up visit.    Allergies/ADRs: Reviewed in chart  Past Medical History: Reviewed in chart  Tobacco: She reports that she has never smoked. She has never used smokeless tobacco.  Alcohol: Less than 1 beverages / week      Medication Adherence/Access: no issues reported    Ulcerative Colitis:   Stelara 90 mg every 8 weeks  Azathioprine 150 mg once daily  Compounded tacrolimus 3 g rectally twice daily    Denies side effects or injection site reactions. Lipid panel removed from standing labs; this was recommended in setting of extended Rinvoq induction and is no longer indicated now that she is on Stelara.    GI symptoms:  No abdominal pain  2-3 bowel movements per day  No blood    Prior authorization status: Stelara 1/56 approved through 1/17/2025  Original start date: February 2024  Last dose: 4/4/2024  Next dose: 5/30/2024    Stelara initiation:  Week 0 (IV infusion): 2/9/2024  Week 8 (first subcutaneous): 4/4/2024    Azathioprine monitoring schedule:   Start date: 1/31/24   Week 2: 2/14   Week 4: not done   Week 8: 3/26/24 (done  Week 12: April 24th - Patient reports labs done on Wednesday   Week 16: May 22nd     IBD Health Maintenance    Vaccinations:  All patients on biologics should avoid live vaccines unless specifically indicated.    -- Influenza (every year)- not on file  -- TdaP (every 10 years)- last 2021  -- Pneumococcal Pneumonia    Prevnar-13: not on file   Pneumovax-23: 97/2022, 4/4/2017   Prevnar-20:  -- COVID-19- 1/10/2022      One time confirmation of  immunity or serologies:  -- Hepatitis A (serologies or immunizations) 4/2014, 8/2013  -- Hepatitis B (serologies or immunizations)- serology indicates immunity 2017  -- Varicella/Zoster               Varicella- 11/4/2010, 10/29/1998              Zoster- not on file  -- MMR- 4/25/2003, 10/29/1998  -- HPV (all aged 18-26)- not on file    Due to the immunosuppression in this patient, I would not advise administration of live vaccines such as varicella/VZV, intranasal influenza, MMR, or yellow fever vaccine (if traveling).      Immunosuppressive Screening:  -- Hepatitis B surface antibody: serology indicates immunity 2017  -- Hepatitis B surface antigen: nonreactive 12/3/2016  -- Hepatitis B core antibody: nonreactive 12/3/2016   -- Hep C Antibody not done   -- Yearly assessment of TB negative 1/25/2024    Bone mineral density screening   -- Recommend all patients supplement with calcium and vitamin D  -- Last DEXA 2018 normal  -- Reports 3 servings of dairy/day     Cancer Screening:  Colon cancer screening:  Given pancolitis, recommend patient undergo regular dysplasia surveillance   Last flex sig 11/2023    Cervical cancer screening: Annual due to immunosupression- last 9/7/2022    Skin cancer screening: Annual visual exam of skin by dermatologist since patient is immunocompromised- last 3/20/2023, scheduled 10/9/2024     Depression Screening:    PHQ-2 Score:         4/4/2024     3:21 PM 1/10/2024     9:56 AM   PHQ-2 ( 1999 Pfizer)   Q1: Little interest or pleasure in doing things 0 0   Q2: Feeling down, depressed or hopeless 0 0   PHQ-2 Score 0 0       Research:  Are you interested in being contacted about enrollment in clinical research studies? Yes    Would you like to receive a quarterly newsletter on research via email. YES [email or web address]       Misc:  -- Avoid tobacco use  -- Avoid NSAIDs as there is potentially a 25% chance of causing an IBD flare      ----------------      I spent 26 minutes with this  patient today. All changes were made via collaborative practice agreement with Camilo Rodriguez PA-C. A copy of the visit note was provided to the patient's provider(s).    A summary of these recommendations was sent via [employer].    Javon Chavarria PharmD, [facility]  [facility] Pharmacist   [facility]  Phone: [phone]    Telemedicine Visit Details  Type of service:  Telephone visit  Start Time:  9:00 AM  End Time:  9:26 AM     Medication Therapy Recommendations  No medication therapy recommendations to display         Again, thank you for allowing me to participate in the care of your patient.        Sincerely,        Javon Chavarria RPH

## 2024-04-29 NOTE — PATIENT INSTRUCTIONS
"Recommendations from today's MTM visit:                                                      You are up to date on labs. Your next lab work is due 5/22. I will add additional labs including a Hepatitis C screening and thiopurine metabolites.  Poonam will consider the following vaccines:  Annual flu shot  Updated COVID-19 vaccine  Pneumococcal pneumonia (Prevnar-20)  Shingles (Shingrix 2-dose series)  Continue vitamin D 1000 units.   Reminder to schedule a routine Pap test.     Follow-up: 10/28/2024 at 9:00 AM    It was great speaking with you today.  I value your experience and would be very thankful for your time in providing feedback in our clinic survey. In the next few days, you may receive an email or text message from [employer] with a link to a survey related to your  clinical pharmacist.\"     To schedule another MTM appointment, please call the clinic directly or you may call the MTM scheduling line at [phone] or toll-free at [phone].     My Clinical Pharmacist's contact information:                                                      Please feel free to contact me with any questions or concerns you have.      Javon Chavarria, PharmD, BCPS  MTM Pharmacist   [facility]  Phone: [phone]   "

## 2024-05-02 DIAGNOSIS — K51.00 ULCERATIVE PANCOLITIS (H): ICD-10-CM

## 2024-05-03 RX ORDER — AZATHIOPRINE 50 MG/1
50 TABLET ORAL DAILY
Qty: 90 TABLET | Refills: 0 | Status: SHIPPED | OUTPATIENT
Start: 2024-05-03 | End: 2024-06-06

## 2024-05-03 NOTE — TELEPHONE ENCOUNTER
Rx Refill Request:   Imuran 50 mg PO daily   Last Written Prescription Date: 1/26/24  Last Fill Quantity:  90 tab,   # refills: 2    Future Office visit: 6/26/24  Last Office Visit :  1/10/24  Dx: Ulcerative colitis, pancolitis  --Labs to be completed for baseline and biologic transitioning with thiopurine lab schedule  --Start Stelara   -- Start azathioprine 150 mg daily pending TPMT  -- Start tacrolimus suppository  -- Continue working with [facility] pharmacy      Refills sent to [facility] to cover until [facility].  Sent reminder via portal message to ask for new prescription at [facility].

## 2024-05-08 ENCOUNTER — TELEPHONE (OUTPATIENT)
Dept: GASTROENTEROLOGY | Facility: CLINIC | Age: 29
End: 2024-05-08
Payer: COMMERCIAL

## 2024-05-08 DIAGNOSIS — K51.00 ULCERATIVE PANCOLITIS WITHOUT COMPLICATION (H): Primary | ICD-10-CM

## 2024-05-08 NOTE — TELEPHONE ENCOUNTER
Patient requested labs to be sent to their outside lab.     Clinic:   [facility]  [address] [place] [address]  Phone: [phone]     Fax: [phone]      Faxed:   -Thiopurine Metabolites by LC-MS/MS        Confirmed receipt to the facility?       Mildred Hernandez LPN

## 2024-05-08 NOTE — TELEPHONE ENCOUNTER
"----- Message from Mildred Hernandez LPN sent at 5/2/2024  3:02 PM CDT -----    ----- Message -----  From: Javon Chavarria [facility]  Sent: 5/2/2024   9:28 AM CDT  To: [facility] Support Pool    Hi all:    Need to update the lab orders at [facility] in [place].    She should already have standing labs there (CRP, CBC with platelets & diff, hepatic panel). She has 1 more monthly blood draw due on 5/22 and then she will get these done every 3 months.     Need to REMOVE the lipid profile standing lab order. I have cancelled this in EPIC.     Need to ADD Thiopurine metabolites \"Thiopurine Metabolites by LC-MS/MS\" x1 to her next blood draw due 5/22/24. I have not ordered this in EPIC- I don't want it to accidentally get drawn if she gets labs done at [place] in future. Let me know if you need me to place an order for this. I have verbal agreement from Camilo Rodriguez PA-C.     Suhail Chavarria, PharmD, BCPS  MTM Pharmacist   [facility]  Phone: [phone]  "

## 2024-05-08 NOTE — TELEPHONE ENCOUNTER
"----- Message from Mildred Hernandez LPN sent at 5/2/2024  3:02 PM CDT -----    ----- Message -----  From: Javon Chavarria [facility]  Sent: 5/2/2024   9:28 AM CDT  To: [facility] Support Pool    Hi all:    Need to update the lab orders at [facility] in [place].    She should already have standing labs there (CRP, CBC with platelets & diff, hepatic panel). She has 1 more monthly blood draw due on 5/22 and then she will get these done every 3 months.     Need to REMOVE the lipid profile standing lab order. I have cancelled this in EPIC.     Need to ADD Thiopurine metabolites \"Thiopurine Metabolites by LC-MS/MS\" x1 to her next blood draw due 5/22/24. I have not ordered this in EPIC- I don't want it to accidentally get drawn if she gets labs done at [place] in future. Let me know if you need me to place an order for this. I have verbal agreement from Camilo Rodriguez PA-C.     Suhail Chavarria, PharmD, BCPS  MTM Pharmacist   [facility]  Phone: [phone]  "

## 2024-05-20 NOTE — PROGRESS NOTES
Attempted to call patient, mailbox is full.    IBD CLINIC VISIT     CC/REFERRING MD:  Brandt Madrigal  REASON FOR CONSULTATION: Ulcerative colitis  FOLLOWS WITH: Dr. Moeller    ASSESSMENT/PLAN  25 year old female with ulcerative colitis.     1. Ulcerative pancolitis    Currently she is in clinical remission on adalimumab every week and azathioprine 50 mg once a day to prevent antibody formation. Fecal lactoferrin was mildly elevated in December at 80.9. She has not had a flexible sigmoidoscopy to assess disease response due to the COVID 19 pandemic since endoscopy in 2019 showed mild inflammation in the rectum. Overall, given clinical remission, will plan to continue current therapy.    We again discussed the importance of remaining on the patient's current immunosuppressive therapy during the COVID pandemic, and recommended she obtain the vaccine when available.    -- Continue adalimumab weekly and azathioprine 150 mg daily   -- Obtain routine monitoring blood work at her convenience (orders are in)  -- Repeat blood work in 3-4 months  -- Will be due for surveillance colonoscopy in 2023    2.  C. difficile infection:   Check C diff if new symptoms.     3. Iron deficient anemia:   Anemic with normal MCV at last lab check. Likely anemia related to UC.   -- Will be re-checking CBC with routine labs above  -- Consider re-transfusion of iron if Hb < 9.     4. Oral ulcers: Resolved at this time, suspect related to stress in school and not azathioprine.    IBD HISTORY  Age at diagnosis: 20  Extent of disease: pancolitis  Current UC medications:  - Adalimumab every week (started Nov 2018, dose escalated Nov 2019)  - Azathioprine 50mg (sepetmebr 2018)  Prior UC surgeries:  Prior IBD Medications:  - Azathioprine 150mg --> flu like illness  - Canasa  - Infliximab 5mg/kg (400mg) q6    DRUG MONITORING  TPMT enzyme activity:     6-TGN/6-MMPN levels:  7/15620: 6-TGN: 137, 6MMPN 3888    Biologic concentration:  7/3/18: IFX: <1, Ab 30 (normal < 50) - Rocha test (q8 weeks,  monotherapy)  11/5/18: IFX: Not detected, antibodies: 14    11/15/19: Adalimumab: 3.2, no antibodies     DISEASE ASSESSMENT  Labs:  Recent Labs   Lab Test 11/15/19  1331   CRP 23.5*   SED 95*     Endoscopic assessment: 2017: normal TI, inflam polyps in ascending and transverse. eMayo 1 inflammation in distal sigmoid and rectum  Enterography: Normal small bowel (3/23/16)  Fecal lactoferrin: 80.9 (12/2020)  C diff: Positive August 2018  pMayo score: 0/9 (on steroids)    IBD Health Care Maintenance:    Vaccinations:  All patients on biologics should avoid live vaccines.    -- Influenza (every year)  -- TdaP (every 10 years)  -- Pneumococcal Pneumonia (once plus booster at 5 years)  -- Yearly assessment for latent Tb (verbal screening and exam, PPD or QuantiFERON-Tb testing)    One time confirmation of immunity or serologies:  -- Hepatitis A (serologies or immunizations)  -- Hepatitis B (serologies or immunizations)  -- Varicella  -- MMR  -- HPV (all aged 18-26)  -- Meningococcal meningitis (all patients at risk for meningitis)   Due to the immunosuppression in this patient, I would not advise administration of live vaccines such as varicella/VZV, intranasal influenza, MMR, or yellow fever vaccine (if travelling).      Bone mineral density screening   -- Recommend all patients supplement with calcium and vitamin D  -- Given steroid use recommend DEXA if not already done    Cancer Screening:  Colon cancer screening:  Given pancolitis, recommend patient undergo regular dysplasia surveillance   Next dysplasia screening is recommended 2023.    Cervical cancer screening: Annual due to immunosupression    Skin cancer screening: Annual visual exam of skin by dermatologist since patient is immunocompromised    Depression Screening:  PHQ-2 Score:     PHQ-2 ( 1999 Pfizer) 2/1/2021 11/15/2019   Q1: Little interest or pleasure in doing things 0 0   Q2: Feeling down, depressed or hopeless 0 0   PHQ-2 Score 0 0   Q1: Little interest  or pleasure in doing things - Not at all   Q2: Feeling down, depressed or hopeless - Not at all   PHQ-2 Score - 0         Misc:  -- Avoid tobacco use  -- Avoid NSAIDs as there is potentially a 25% chance of causing an IBD flare    Return to clinic in 6 months    Thank you for this consultation.  It was a pleasure to participate in the care of this patient; please contact us with any further questions.     This note was created with voice recognition software, and while reviewed for accuracy, typos may remain.     Tono Oshea MD  Gastroenterology Fellow, PGY-4  [phone]          HPI:     Here today for routine follow-up, last seen 2020 by Dr. Gotti.    Overall doing well, denies any symptoms related to her ulcerative colitis.  Weekly humira has been very helpful.  Remains on azathioprine.  Does not think she is having any adverse effects from these medications.    Currently having 1-3 stools a day, this is her baseline.  Stools are formed and no blood.  No urgency, tenesmus. Denies abdominal pain, nausea, vomiting.     Cold sores have resolved, thinks they were related to stress as they occurred at a particularly stressful time in her life.     Fecal lactoferrin was drawn in [place] and elevated at 80.9. Has not been able to schedule routing labs for monitoring, orders are in from Dr. Moeller to have these performed in [place].    Recently graduated from [employer]. Continuing to work as an  for [employer] insurance. Completely work from home at this time.     Has not had any issues with COVID, is taking appropriate precautions.    Rocha score:   Stool freq: 0 (baseline stools frequency)  Rectal bleedin (None)  PGA: 0 (normal)  Endoscopy: Not done        C diff testin/1/16: negative  18: negative  18: positive  19: Negative    ROS:    No fevers or chills  No weight loss  No blurry vision, double vision or change in vision  No sore throat  No lymphadenopathy  No headache,  paraesthesias, or weakness in a limb  No shortness of breath or wheezing  No chest pain or pressure  No arthralgias or myalgias  No rashes or skin changes  No odynophagia or dysphagia  No BRBPR, hematochezia, melena  No dysuria, frequency or urgency  No hot/cold intolerance or polyria  No anxiety or depression    Extra intestinal manifestations of IBD:  No uveitis/episcleritis  No aphthous ulcers   No arthritis   No erythema nodosum/pyoderma gangrenosum.     PERTINENT PAST MEDICAL HISTORY:  Past Medical History:   Diagnosis Date     Ulcerative colitis (H)        PREVIOUS SURGERIES:  No past surgical history on file.    PREVIOUS ENDOSCOPY:  Care Everywhere    ALLERGIES:     Allergies   Allergen Reactions     Infliximab Nausea and Other (See Comments)     FACE FLUSHING WITH ITCHY/SCRATCHY THROAT.        PERTINENT MEDICATIONS:    Current Outpatient Medications:      adalimumab (HUMIRA *CF* PEN) 40 MG/0.4ML pen kit, Inject 0.4 mLs (40 mg) Subcutaneous every 7 days, Disp: 4 each, Rfl: 5     azaTHIOprine (IMURAN) 50 MG tablet, Take 3 tablets (150 mg) by mouth daily, Disp: 90 tablet, Rfl: 3     UCERIS 2 MG/ACT FOAM, , Disp: , Rfl:     SOCIAL HISTORY:  Social History     Socioeconomic History     Marital status: Single     Spouse name: Not on file     Number of children: Not on file     Years of education: Not on file     Highest education level: Not on file   Occupational History     Not on file   Social Needs     Financial resource strain: Not on file     Food insecurity     Worry: Not on file     Inability: Not on file     Transportation needs     Medical: Not on file     Non-medical: Not on file   Tobacco Use     Smoking status: Never Smoker     Smokeless tobacco: Never Used   Substance and Sexual Activity     Alcohol use: Not Currently     Drug use: Not Currently     Sexual activity: Not on file   Lifestyle     Physical activity     Days per week: Not on file     Minutes per session: Not on file     Stress: Not on file    Relationships     Social connections     Talks on phone: Not on file     Gets together: Not on file     Attends Rastafari service: Not on file     Active member of club or organization: Not on file     Attends meetings of clubs or organizations: Not on file     Relationship status: Not on file     Intimate partner violence     Fear of current or ex partner: Not on file     Emotionally abused: Not on file     Physically abused: Not on file     Forced sexual activity: Not on file   Other Topics Concern     Not on file   Social History Narrative     Not on file       FAMILY HISTORY:  Medical History Relation Name Comments   Diabetes Father       High Cholesterol Father       Sleep Apnea Father       Heart Disease Maternal Grandfather       Cancer (not otherwise listed) Maternal Grandmother       Hypertension Mother       Diabetes Paternal Grandfather       Heart Disease Paternal Grandfather         Past/family/social history reviewed and no changes    PHYSICAL EXAMINATION:  Deferred on telephone visit.       PERTINENT STUDIES:  Most recent CBC:  Recent Labs   Lab Test 11/15/19  1331   WBC 5.7   HGB 10.1*   HCT 31.6*        Most recent hepatic panel:  Recent Labs   Lab Test 11/15/19  1331   ALT 11   AST 7     Most recent creatinine:  No lab results found.

## 2024-06-06 ENCOUNTER — TELEPHONE (OUTPATIENT)
Dept: GASTROENTEROLOGY | Facility: CLINIC | Age: 29
End: 2024-06-06
Payer: COMMERCIAL

## 2024-06-06 DIAGNOSIS — K51.00 ULCERATIVE PANCOLITIS (H): ICD-10-CM

## 2024-06-06 RX ORDER — AZATHIOPRINE 50 MG/1
150 TABLET ORAL DAILY
Qty: 270 TABLET | Refills: 1 | Status: SHIPPED | OUTPATIENT
Start: 2024-06-06

## 2024-06-06 NOTE — TELEPHONE ENCOUNTER
Last provider visit: 1/10/2024 JONATHAN Rodriguez  Next provider visit: 2024 JONATHAN Rodriguez  Last labs completed: 2024  Lab frequency: every 3 months   - standing labs yes hepatic, crp, cbc with platelets & diff  2025  Next labs due: 2024

## 2024-06-26 ENCOUNTER — VIRTUAL VISIT (OUTPATIENT)
Dept: GASTROENTEROLOGY | Facility: CLINIC | Age: 29
End: 2024-06-26
Attending: PHYSICIAN ASSISTANT
Payer: COMMERCIAL

## 2024-06-26 DIAGNOSIS — K51.00 ULCERATIVE PANCOLITIS WITHOUT COMPLICATION (H): Primary | ICD-10-CM

## 2024-06-26 PROCEDURE — 99214 OFFICE O/P EST MOD 30 MIN: CPT | Mod: 95 | Performed by: PHYSICIAN ASSISTANT

## 2024-06-26 NOTE — PROGRESS NOTES
Virtual Visit Details    Type of service:  Video Visit     Originating Location (pt. Location): Home    Distant Location (provider location):  Off-site  Platform used for Video Visit: [facility]    IBD CLINIC VISIT   ASSESSMENT/PLAN    28 year old reji with Ulcerative pancolitis     1. Ulcerative colitis, pancolitis:   Current UC medications:        -- Stelara (infusion 2/9/24, injection 4/5/24)        -- Azathioprine 150 mg daily   Current clinical disease activity: Rocha score: 0  Last endoscopic disease activity: eMayo 2 on flex sig 11/2023. Pseudopolyps in rectum, sigmoid and descending.  Erythematous mucosa at the splenic flexure, moderate inflammation in the proximal and distal rectum  by normal-appearing mucosa, remainder of the colon largely without active inflammation except a small patch at the splenic flexure.  Biopsies show chronic active inflammation, Veronica grade 4 at the splenic flexure, need to grade 1 in the sigmoid, needs to grade 3-4 in the rectum.  No dysplasia noted.    Repeated endoscopic assessment, Rinvoq 45 mg was not allowing for sufficient progress to healing (complicated by a gap in insurance last year with the delay in transitioning to another medication)    We have successfully transitioned to Stelara with azathioprine combination and have achieved symptomatic remission.  We will continue with this plan.    -- Continue Stelara every 8 weeks  -- Continue azathioprine 150 mg daily  -- Labs to include CBC, LFTs, CRP every 3 months.  -- Next endoscopic assessment: flex sig in Aug  -- Continue working with [facility] pharmacy      Camilo Rodriguez PA-C  Division of Gastroenterology, Hepatology and Nutrition  [facility]     2. Iron deficiency anemia, resolved. Celiac serology negative. Could be slow occult bleeding from pseudopolyps.  At this time expect she will have recurrent iron deficiency anemia given her colitis  -- completed iron infusions    3. History of C Diff infection:  2018 and 2022.  December 22 was discordant testing.  See discussion above regarding colonization and marker of severe colitis versus infection.  --Avoid FMT in the future given possibility of stimulating ulcerative colitis flare    4. Dysplasia surveillance: Last colonoscopy 5/2023.  Due every 2 years     return to clinic in 6 months.    Thank you for this consultation.  It was a pleasure to participate in the care of this patient; please contact us with any further questions.      Camilo Rodriguez PA-C  Division of Gastroenterology, Hepatology and Nutrition  [facility]     IBD HISTORY  Age at diagnosis: 20  Extent of disease: pancolitis  Prior UC surgeries:  Prior IBD Medications:   - Canasa   - Infliximab 5mg/kg (400mg) q6   - Azathioprine 150mg (sepetmebr 2018 - Summer 2022) - de-escalated due to    - Adalimumab weekly (started Nov 2018, dose escalated Nov 2019)   - Vedolizumab: Fall 2022 - May 2023 - loss of respose q8 dosing   - Rinvoq 45 mg: persistent moderate disease on flex sig     DRUG MONITORING  TPMT enzyme activity:      6-TGN/6-MMPN levels:  7/15/20: 6-TGN: 137, 6MMPN 3888     Biologic concentration:  7/3/18: IFX: <1, Ab 30 (normal < 50) - Rocha test (q8 weeks, monotherapy)  11/5/18: IFX: Not detected, antibodies: 14     11/15/19: Adalimumab: 3.2, no antibodies     HPI:   She is now having 3 stools per day. No urgency or nighttime stools. No blood in the stool. She has been compliant with stelara every 8 weeks and azathioprine 150 mg daily. She is also taking tumeric OTC.  No new EIM.    Rocha score:   Stool freq: 2 (3-4 stools/day more than normal)  Rectal bleeding: 3 (Passage of blood alone)  PGA: 2 (Moderate)  Endoscopy: 2 (Moderate)    Remission: <3   Mild disease: 3-5  Moderate disease: 6-10  Severe disease: >10    Extra intestinal manifestations of IBD:  No uveitis/episcleritis  No: aphthous ulcers   No arthritis   No erythema nodosum/pyoderma gangrenosum.     Nutrition goals:  Not  limiting anything at this time.    sIBDQ:  IBDQ Score Date IBDQ - Total Score  (Higher score better)   6/26/2024  10:38 AM 65   3/14/2023   8:55 AM 62   11/15/2019  11:36 AM 57        ROS:    Constitutional, HEENT, cardiovascular, pulmonary, GI, , musculoskeletal, neuro, skin, endocrine and psych systems are negative, except as otherwise noted.    PHYSICAL EXAMINATION:  Constitutional: aaox3, cooperative, pleasant, not dyspneic/diaphoretic, no acute distress  Vitals reviewed: There were no vitals taken for this visit.  Wt:   Wt Readings from Last 2 Encounters:   11/20/23 77.1 kg (170 lb)   08/09/23 77.1 kg (170 lb)      Constitutional - general appearance is well and in no acute distress. Body habitus normal  Eyes - No redness or discharge  Respiratory - No cough, unlabored breathing  Musculoskeletal - range of motion intact: Neck and arms  Skin - No discoloration or lesions  Neurological - No tremors, headaches  Psychiatric - No anxiety, alert & oriented    PERTINENT PAST MEDICAL HISTORY:  Past Medical History:   Diagnosis Date    Ulcerative colitis (H)        PREVIOUS SURGERIES:  Past Surgical History:   Procedure Laterality Date    COLONOSCOPY N/A 5/31/2023    Procedure: COLONOSCOPY, WITH BIOPSY;  Surgeon: Gabino Krueger MD;  Location: [facility] OR       ALLERGIES:     Allergies   Allergen Reactions    Infliximab Nausea and Other (See Comments)     FACE FLUSHING WITH ITCHY/SCRATCHY THROAT.        PERTINENT MEDICATIONS:    Current Outpatient Medications:     azaTHIOprine (IMURAN) 50 MG tablet, Take 3 tablets (150 mg) by mouth daily, Disp: 270 tablet, Rfl: 1    COMPOUNDED NON-CONTROLLED SUBSTANCE (CMPD RX) - PHARMACY TO MIX COMPOUNDED MEDICATION, Place 1.5 mg tacrolimus (3 g) ointment rectally twice daily via vaginal topiclick device. (Patient not taking: Reported on 4/4/2024), Disp: 180 g, Rfl: 2    EPINEPHrine (ANY BX GENERIC EQUIV) 0.3 MG/0.3ML injection 2-pack, Inject 0.3 mLs (0.3 mg) into the muscle as  needed for anaphylaxis May repeat one time in 5-15 minutes if response to initial dose is inadequate., Disp: 2 each, Rfl: 0    HERBALS, Herbal cider vinegar, supergreens supplement, Disp: , Rfl:     multivitamin w/minerals (THERA-VIT-M) tablet, Take 1 tablet by mouth daily, Disp: , Rfl:     ustekinumab (STELARA) 90 MG/ML, Inject 1 ml ( 90 mg) subcutaneous every 8 weeks. (Stelara induction dose completed on 2/9/24. First subcutaneous dose is due on 4/5/24), Disp: 1 mL, Rfl: 5    SOCIAL HISTORY:  Social History     Socioeconomic History    Marital status:      Spouse name: Not on file    Number of children: Not on file    Years of education: Not on file    Highest education level: Not on file   Occupational History    Not on file   Tobacco Use    Smoking status: Never    Smokeless tobacco: Never   Vaping Use    Vaping status: Never Used   Substance and Sexual Activity    Alcohol use: Not Currently     Comment: 1-2 monthly    Drug use: Not Currently    Sexual activity: Not on file   Other Topics Concern    Not on file   Social History Narrative    Not on file     Social Determinants of Health     Financial Resource Strain: Not on file   Food Insecurity: Not on file   Transportation Needs: Not on file   Physical Activity: Not on file   Stress: Not on file   Social Connections: Not on file   Interpersonal Safety: Not on file   Housing Stability: Not on file     FAMILY HISTORY:  No FH of IBD/CRC.  No family history on file.

## 2024-06-26 NOTE — NURSING NOTE
Is the patient currently in the state of [place]? YES    Visit mode:VIDEO    If the visit is dropped, the patient can be reconnected by: VIDEO VISIT: Text to cell phone:   Telephone Information:   Mobile [phone]       Will anyone else be joining the visit? NO  (If patient encounters technical issues they should call [phone]    How would you like to obtain your AVS? MyChart    Are changes needed to the allergy or medication list? No    Are refills needed on medications prescribed by this physician? NO    Reason for visit: RECHECK    Jatinder ROCKWELL

## 2024-06-26 NOTE — PATIENT INSTRUCTIONS
It was a pleasure taking care of you today.  I've included a brief summary of our discussion and care plan from today's visit below.  Please review this information with your primary care provider.  ______________________________________________________________________    My recommendations are summarized as follows:    -- Continue Stelara every 8 weeks  --Continue azathioprine 150 mg daily  -- Labs every 3 months  -- Next endoscopic assessment: flex sig in Aug  -- Patient with IBD we recommend supplementation vitamin D 1000 units daily and calcium 500 mg twice daily.  -- Vaccines/immunizations to be updated: flu and updated covid shot  -- No NSAIDs (ibuprofen, or anything containing ibuprofen)    For additional resources about inflammatory bowel disease visit http://www.crohnscolitisfoundation.org/    To learn more about Diet and Nutrition in the setting of IBD, check out some of these resources:  https://www.crohnscolitisfoundation.org/diet-and-nutrition/what-should-i-eat  https://www.nimbal.org/  https://ntforibd.org/      Return to GI Clinic in 6 months to review your progress.    ______________________________________________________________________    How do I schedule labs, imaging studies, or procedures that were ordered in clinic today?     Labs: To schedule lab appointment at the [facility], use my chart or call [phone]. If you have a [place] lab closer to home where you are regularly seen you can give them a call.     Procedures: If a colonoscopy, upper endoscopy, breath test, esophageal manometry, or pH impedence was ordered today, our endoscopy team will call you to schedule this. If you have not heard from our endoscopy team within a week, please call [phone] to schedule.     Imaging Studies: If you were scheduled for a CT scan, X-ray, MRI, ultrasound, HIDA scan or other imaging study, please call [phone] to have this scheduled.     Referral: If a referral to another  specialty was ordered, expect a phone call or follow instructions above. If you have not heard from anyone regarding your referral in a week, please call our clinic to check the status.     Who do I call with any questions after my visit?  Please be in touch if there are any further questions that arise following today's visit.  There are multiple ways to contact your gastroenterology care team.      During business hours, you may reach a Gastroenterology nurse at [phone]    To schedule or reschedule an appointment, please call [phone].     You can always send a secure message through [employer].  [employer] messages are answered by your nurse or doctor typically within 24 hours.  Please allow extra time on weekends and holidays.      For urgent/emergent questions after business hours, you may reach the on-call GI Fellow by contacting the [facility]  at [phone].     How will I get the results of any tests ordered?    You will receive all of your results.  If you have signed up for [employer], any tests ordered at your visit will be available to you after your physician reviews them.  Typically this takes 1-2 weeks.  If there are urgent results that require a change in your care plan, your physician or nurse will call you to discuss the next steps.      What is [employer]?  [employer] is a secure way for you to access all of your healthcare records from the [facility].  It is a web based computer program, so you can sign on to it from any location.  It also allows you to send secure messages to your care team.  I recommend signing up for [employer] access if you have not already done so and are comfortable with using a computer.         Sincerely,    Camilo Rodriguez PA-C  [facility]  Division of Gastroenterology

## 2024-06-26 NOTE — LETTER
6/26/2024      Poonam Berg  [address] [facility] [address]      Dear Colleague,    Thank you for referring your patient, oPonam Berg, to the [facility] [place]. Please see a copy of my visit note below.    IBD CLINIC VISIT   ASSESSMENT/PLAN    28 year old reji with Ulcerative pancolitis     1. Ulcerative colitis, pancolitis:   Current UC medications: Stelara (infusion 2/9/24, injection 4/5/24)    Current clinical disease activity: Rocha score: 0  Last endoscopic disease activity: eMayo 2 on flex sig 11/2023. Pseudopolyps in rectum, sigmoid and descending.  Erythematous mucosa at the splenic flexure, moderate inflammation in the proximal and distal rectum  by normal-appearing mucosa, remainder of the colon largely without active inflammation except a small patch at the splenic flexure.  Biopsies show chronic active inflammation, Veronica grade 4 at the splenic flexure, need to grade 1 in the sigmoid, needs to grade 3-4 in the rectum.  No dysplasia noted.    Repeated endoscopic assessment, Rinvoq 45 mg was not allowing for sufficient progress to healing (complicated by a gap in insurance last year with the delay in transitioning to another medication)    We have successfully transitioned to Stelara with azathioprine combination and have achieved symptomatic remission.  We will continue with this plan.    -- Continue Stelara every 8 weeks  -- Continue azathioprine 150 mg daily  -- Labs to include CBC, LFTs, CRP every 3 months.  -- Next endoscopic assessment: flex sig in Aug  -- Continue working with [facility] pharmacy      Camilo Rodriguez PA-C  Division of Gastroenterology, Hepatology and Nutrition  [facility]     2. Iron deficiency anemia, resolved. Celiac serology negative. Could be slow occult bleeding from pseudopolyps.  At this time expect she will have recurrent iron deficiency anemia given her colitis  -- completed iron infusions    3. History of C Diff  infection: 2018 and 2022.  December 22 was discordant testing.  See discussion above regarding colonization and marker of severe colitis versus infection.  --Avoid FMT in the future given possibility of stimulating ulcerative colitis flare    4. Dysplasia surveillance: Last colonoscopy 5/2023.  Due every 2 years     return to clinic in 6 months.    Thank you for this consultation.  It was a pleasure to participate in the care of this patient; please contact us with any further questions.      Camilo Rodriguez PA-C  Division of Gastroenterology, Hepatology and Nutrition  [facility]     IBD HISTORY  Age at diagnosis: 20  Extent of disease: pancolitis  Prior UC surgeries:  Prior IBD Medications:   - Canasa   - Infliximab 5mg/kg (400mg) q6   - Azathioprine 150mg (sepetmebr 2018 - Summer 2022) - de-escalated due to    - Adalimumab weekly (started Nov 2018, dose escalated Nov 2019)   - Vedolizumab: Fall 2022 - May 2023 - loss of respose q8 dosing   - Rinvoq 45 mg: persistent moderate disease on flex sig     DRUG MONITORING  TPMT enzyme activity:      6-TGN/6-MMPN levels:  7/15/20: 6-TGN: 137, 6MMPN 3888     Biologic concentration:  7/3/18: IFX: <1, Ab 30 (normal < 50) - Rocha test (q8 weeks, monotherapy)  11/5/18: IFX: Not detected, antibodies: 14     11/15/19: Adalimumab: 3.2, no antibodies     HPI:   She is now having 3 stools per day. No urgency or nighttime stools. No blood in the stool. She has been compliant with stelara every 8 weeks and azathioprine 150 mg daily. She is also taking tumeric OTC.  No new EIM.    Rocha score:   Stool freq: 2 (3-4 stools/day more than normal)  Rectal bleeding: 3 (Passage of blood alone)  PGA: 2 (Moderate)  Endoscopy: 2 (Moderate)    Remission: <3   Mild disease: 3-5  Moderate disease: 6-10  Severe disease: >10    Extra intestinal manifestations of IBD:  No uveitis/episcleritis  No: aphthous ulcers   No arthritis   No erythema nodosum/pyoderma gangrenosum.     Nutrition  goals:  Not limiting anything at this time.    sIBDQ:  IBDQ Score Date IBDQ - Total Score  (Higher score better)   6/26/2024  10:38 AM 65   3/14/2023   8:55 AM 62   11/15/2019  11:36 AM 57        ROS:    Constitutional, HEENT, cardiovascular, pulmonary, GI, , musculoskeletal, neuro, skin, endocrine and psych systems are negative, except as otherwise noted.    PHYSICAL EXAMINATION:  Constitutional: aaox3, cooperative, pleasant, not dyspneic/diaphoretic, no acute distress  Vitals reviewed: There were no vitals taken for this visit.  Wt:   Wt Readings from Last 2 Encounters:   11/20/23 77.1 kg (170 lb)   08/09/23 77.1 kg (170 lb)      Constitutional - general appearance is well and in no acute distress. Body habitus normal  Eyes - No redness or discharge  Respiratory - No cough, unlabored breathing  Musculoskeletal - range of motion intact: Neck and arms  Skin - No discoloration or lesions  Neurological - No tremors, headaches  Psychiatric - No anxiety, alert & oriented    PERTINENT PAST MEDICAL HISTORY:  Past Medical History:   Diagnosis Date    Ulcerative colitis (H)        PREVIOUS SURGERIES:  Past Surgical History:   Procedure Laterality Date    COLONOSCOPY N/A 5/31/2023    Procedure: COLONOSCOPY, WITH BIOPSY;  Surgeon: Gabino Krueger MD;  Location: [facility] OR       ALLERGIES:     Allergies   Allergen Reactions    Infliximab Nausea and Other (See Comments)     FACE FLUSHING WITH ITCHY/SCRATCHY THROAT.        PERTINENT MEDICATIONS:    Current Outpatient Medications:     azaTHIOprine (IMURAN) 50 MG tablet, Take 3 tablets (150 mg) by mouth daily, Disp: 270 tablet, Rfl: 1    COMPOUNDED NON-CONTROLLED SUBSTANCE (CMPD RX) - PHARMACY TO MIX COMPOUNDED MEDICATION, Place 1.5 mg tacrolimus (3 g) ointment rectally twice daily via vaginal topiclick device. (Patient not taking: Reported on 4/4/2024), Disp: 180 g, Rfl: 2    EPINEPHrine (ANY BX GENERIC EQUIV) 0.3 MG/0.3ML injection 2-pack, Inject 0.3 mLs (0.3 mg) into the  muscle as needed for anaphylaxis May repeat one time in 5-15 minutes if response to initial dose is inadequate., Disp: 2 each, Rfl: 0    HERBALS, Herbal cider vinegar, supergreens supplement, Disp: , Rfl:     multivitamin w/minerals (THERA-VIT-M) tablet, Take 1 tablet by mouth daily, Disp: , Rfl:     ustekinumab (STELARA) 90 MG/ML, Inject 1 ml ( 90 mg) subcutaneous every 8 weeks. (Stelara induction dose completed on 2/9/24. First subcutaneous dose is due on 4/5/24), Disp: 1 mL, Rfl: 5    SOCIAL HISTORY:  Social History     Socioeconomic History    Marital status:      Spouse name: Not on file    Number of children: Not on file    Years of education: Not on file    Highest education level: Not on file   Occupational History    Not on file   Tobacco Use    Smoking status: Never    Smokeless tobacco: Never   Vaping Use    Vaping status: Never Used   Substance and Sexual Activity    Alcohol use: Not Currently     Comment: 1-2 monthly    Drug use: Not Currently    Sexual activity: Not on file   Other Topics Concern    Not on file   Social History Narrative    Not on file     Social Determinants of Health     Financial Resource Strain: Not on file   Food Insecurity: Not on file   Transportation Needs: Not on file   Physical Activity: Not on file   Stress: Not on file   Social Connections: Not on file   Interpersonal Safety: Not on file   Housing Stability: Not on file     FAMILY HISTORY:  No FH of IBD/CRC.  No family history on file.          Again, thank you for allowing me to participate in the care of your patient.      Sincerely,    Camilo Rodriguez PA-C

## 2024-07-02 ENCOUNTER — TELEPHONE (OUTPATIENT)
Dept: GASTROENTEROLOGY | Facility: CLINIC | Age: 29
End: 2024-07-02
Payer: COMMERCIAL

## 2024-07-02 NOTE — TELEPHONE ENCOUNTER
Left Voicemail (1st Attempt) for the patient to call back and schedule the following:     Appointment type: return   Provider: Dr. Moeller   Return date: 12/26   Specialty phone number: [phone]  Additional appointment(s) needed:   Additonal Notes:

## 2024-07-26 ENCOUNTER — TELEPHONE (OUTPATIENT)
Dept: GASTROENTEROLOGY | Facility: CLINIC | Age: 29
End: 2024-07-26
Payer: COMMERCIAL

## 2024-07-26 NOTE — TELEPHONE ENCOUNTER
"Endoscopy Scheduling Screen    Have you had a positive Covid test in the last 14 days?  No    What is your communication preference for Instructions and/or Bowel Prep?   MyChart    What insurance is in the chart?  Other:  BCBS    Ordering/Referring Provider: Camilo Rodriguez PA-C in [facility]    (If ordering provider performs procedure, schedule with ordering provider unless otherwise instructed. )    BMI: Estimated body mass index is 24.39 kg/m  as calculated from the following:    Height as of 11/20/23: 1.778 m (5' 10\").    Weight as of 11/20/23: 77.1 kg (170 lb).     Sedation Ordered  moderate sedation.   If patient BMI > 50 do not schedule in [facility].    If patient BMI > 45 do not schedule at [facility].    Are you taking methadone or Suboxone?  No    Have you had difficulties, pain, or discomfort during past endoscopy procedures?  No    Are you taking any prescription medications for pain 3 or more times per week?   NO, No RN review required.    Do you have a history of malignant hyperthermia?  No    (Females) Are you currently pregnant?   No     Have you been diagnosed or told you have pulmonary hypertension?   No    Do you have an LVAD?  No    Have you been told you have moderate to severe sleep apnea?  No    Have you been told you have COPD, asthma, or any other lung disease?  No    Do you have any heart conditions?  No     Have you ever had or are you waiting for an organ transplant?  No. Continue scheduling, no site restrictions.    Have you had a stroke or transient ischemic attack (TIA aka \"mini stroke\" in the last 6 months?   No    Have you been diagnosed with or been told you have cirrhosis of the liver?   No    Are you currently on dialysis?   No    Do you need assistance transferring?   No    BMI: Estimated body mass index is 24.39 kg/m  as calculated from the following:    Height as of 11/20/23: 1.778 m (5' 10\").    Weight as of 11/20/23: 77.1 kg (170 lb).     Is patients BMI > 40 and scheduling " location [facility]?  No    Do you take an injectable medication for weight loss or diabetes (excluding insulin)?  No    Do you take the medication Naltrexone?  No    Do you take blood thinners?  No       Prep   Are you currently on dialysis or do you have chronic kidney disease?  No    Do you have a diagnosis of diabetes?  No    Do you have a diagnosis of cystic fibrosis (CF)?  No    On a regular basis do you go 3 -5 days between bowel movements?  No    BMI > 40?  No    Preferred Pharmacy:    [id number] - [place], [place] - [address] [place], [place] [address] [address] [place] [address] [place] [address]  Phone: [phone] Fax: [phone]    Final Scheduling Details     Procedure scheduled  Flexible sigmoidoscopy    Surgeon:  Juan Diego-per order     Date of procedure:  10/02/2024     Pre-OP / PAC:   No - Not required for this site.    Location  [facility] - [facility] - Per order.    Sedation   Moderate Sedation - Per order.      Patient Reminders:   You will receive a call from a Nurse to review instructions and health history.  This assessment must be completed prior to your procedure.  Failure to complete the Nurse assessment may result in the procedure being cancelled.      On the day of your procedure, please designate an adult(s) who can drive you home stay with you for the next 24 hours. The medicines used in the exam will make you sleepy. You will not be able to drive.      You cannot take public transportation, ride share services, or non-medical taxi service without a responsible caregiver.  Medical transport services are allowed with the requirement that a responsible caregiver will receive you at your destination.  We require that drivers and caregivers are confirmed prior to your procedure.

## 2024-09-23 ENCOUNTER — TELEPHONE (OUTPATIENT)
Dept: GASTROENTEROLOGY | Facility: CLINIC | Age: 29
End: 2024-09-23
Payer: COMMERCIAL

## 2024-09-23 NOTE — TELEPHONE ENCOUNTER
Per Dr. Moeller ok for patient to do half miralax and gatorade prep vs enemas.     Patient notified of response and new instructions were sent to [employer].    Mena Mane RN  Endoscopy Procedure Pre Assessment RN

## 2024-09-23 NOTE — TELEPHONE ENCOUNTER
Pre assessment completed for upcoming procedure.      Procedure details:    Patient scheduled for Flexible sigmoidoscopy on 10/2/24.     Arrival time: 1145. Procedure time 1245    Facility location: [facility]; [address] [place] [address], [address] Floor, [place] [address]. Check in location: 5th Floor.    Sedation type: Conscious sedation     Pre op exam needed? No.    Indication for procedure: ulcerative pancolitis    Designated  policy reviewed. Instructed to have someone stay 6 hours post procedure.       Chart review:     Electronic implanted devices? No    Recent diagnosis of diverticulitis within the last 6 weeks?  No      Medication review:    Diabetic? No    Anticoagulants? No    Weight loss medication/injectable? No.    Other medication HOLDING recommendations:  N/A      Prep for procedure:     Bowel prep recommendation: Enemas - patient states she does not like doing enemas and would rather complete a colonoscopy bowel prep. Message sent to scoping provider to discuss patient doing a half colonoscopy prep. Will call patient back with response.  Due to: standard bowel prep.    Prep instructions sent via [employer] - not sent yet    Reviewed procedure prep instructions.     Patient verbalized understanding and had no questions or concerns at this time.        Mena Mane RN  Endoscopy Procedure Pre Assessment   [phone] option 2

## 2024-10-02 ENCOUNTER — HOSPITAL ENCOUNTER (OUTPATIENT)
Facility: AMBULATORY SURGERY CENTER | Age: 29
Discharge: HOME | End: 2024-10-02
Attending: INTERNAL MEDICINE | Admitting: INTERNAL MEDICINE
Payer: COMMERCIAL

## 2024-10-02 VITALS
RESPIRATION RATE: 16 BRPM | DIASTOLIC BLOOD PRESSURE: 66 MMHG | TEMPERATURE: 97.5 F | WEIGHT: 201 LBS | HEIGHT: 70 IN | SYSTOLIC BLOOD PRESSURE: 103 MMHG | HEART RATE: 64 BPM | BODY MASS INDEX: 28.77 KG/M2 | OXYGEN SATURATION: 95 %

## 2024-10-02 PROCEDURE — 45331 SIGMOIDOSCOPY AND BIOPSY: CPT | Performed by: INTERNAL MEDICINE

## 2024-10-02 PROCEDURE — 88305 TISSUE EXAM BY PATHOLOGIST: CPT | Mod: TC | Performed by: INTERNAL MEDICINE

## 2024-10-02 PROCEDURE — 81025 URINE PREGNANCY TEST: CPT | Performed by: PATHOLOGY

## 2024-10-02 PROCEDURE — 88305 TISSUE EXAM BY PATHOLOGIST: CPT | Mod: 26 | Performed by: STUDENT IN AN ORGANIZED HEALTH CARE EDUCATION/TRAINING PROGRAM

## 2024-10-02 RX ORDER — ONDANSETRON 2 MG/ML
4 INJECTION INTRAMUSCULAR; INTRAVENOUS
Status: DISCONTINUED | OUTPATIENT
Start: 2024-10-02 | End: 2024-10-03 | Stop reason: HOSPADM

## 2024-10-02 RX ORDER — LIDOCAINE 40 MG/G
CREAM TOPICAL
Status: DISCONTINUED | OUTPATIENT
Start: 2024-10-02 | End: 2024-10-03 | Stop reason: HOSPADM

## 2024-10-02 RX ORDER — FENTANYL CITRATE 50 UG/ML
INJECTION, SOLUTION INTRAMUSCULAR; INTRAVENOUS PRN
Status: DISCONTINUED | OUTPATIENT
Start: 2024-10-02 | End: 2024-10-02 | Stop reason: HOSPADM

## 2024-10-02 NOTE — H&P
Poonam LONGORIA [facility]  [id number]  female  28 year old      Reason for procedure/surgery: Ulcerative colitis    Patient Active Problem List   Diagnosis    Ulcerative pancolitis (H)    Ulcerative pancolitis without complication (H)    Nausea and vomiting    Diarrhea    Iron deficiency       Past Surgical History:    Past Surgical History:   Procedure Laterality Date    COLONOSCOPY N/A 5/31/2023    Procedure: COLONOSCOPY, WITH BIOPSY;  Surgeon: Gabino Krueger MD;  Location: [facility] OR       Past Medical History:   Past Medical History:   Diagnosis Date    Ulcerative colitis (H)        Social History:   Social History     Tobacco Use    Smoking status: Never    Smokeless tobacco: Never   Substance Use Topics    Alcohol use: Not Currently     Comment: 1-2 monthly       Family History: No family history on file.    Allergies:   Allergies   Allergen Reactions    Infliximab Nausea and Other (See Comments)     FACE FLUSHING WITH ITCHY/SCRATCHY THROAT.        Active Medications:   Current Outpatient Medications   Medication Sig Dispense Refill    azaTHIOprine (IMURAN) 50 MG tablet Take 3 tablets (150 mg) by mouth daily 270 tablet 1    COMPOUNDED NON-CONTROLLED SUBSTANCE (CMPD RX) - PHARMACY TO MIX COMPOUNDED MEDICATION Place 1.5 mg tacrolimus (3 g) ointment rectally twice daily via vaginal topiclick device. (Patient not taking: Reported on 4/4/2024) 180 g 2    EPINEPHrine (ANY BX GENERIC EQUIV) 0.3 MG/0.3ML injection 2-pack Inject 0.3 mLs (0.3 mg) into the muscle as needed for anaphylaxis May repeat one time in 5-15 minutes if response to initial dose is inadequate. 2 each 0    HERBALS Herbal cider vinegar, supergreens supplement      multivitamin w/minerals (THERA-VIT-M) tablet Take 1 tablet by mouth daily      ustekinumab (STELARA) 90 MG/ML Inject 1 ml ( 90 mg) subcutaneous every 8 weeks. (Stelara induction dose completed on 2/9/24. First subcutaneous dose is due on 4/5/24) 1 mL 5       Systemic Review:   CONSTITUTIONAL: NEGATIVE  "for fever, chills, change in weight  ENT/MOUTH: NEGATIVE for ear, mouth and throat problems  RESP: NEGATIVE for significant cough or SOB  CV: NEGATIVE for chest pain, palpitations or peripheral edema    Physical Examination:   Vital Signs: /80 (BP Location: Right arm)   Pulse 71   Temp 97.8  F (36.6  C) (Temporal)   Resp 18   Ht 1.778 m (5' 10\")   Wt 91.2 kg (201 lb)   SpO2 98%   BMI 28.84 kg/m    GENERAL: healthy, alert and no distress  NECK: no adenopathy, no asymmetry, masses, or scars  RESP: lungs clear to auscultation - no rales, rhonchi or wheezes  CV: regular rate and rhythm, normal S1 S2, no S3 or S4, no murmur, click or rub, no peripheral edema and peripheral pulses strong  ABDOMEN: soft, nontender, no hepatosplenomegaly, no masses and bowel sounds normal  MS: no gross musculoskeletal defects noted, no edema    ASA Classification: (II)  Mild systemic disease  Airway Exam: Mallampati Score: Class II (Complete visualization of uvula)    Plan: Appropriate to proceed as scheduled.      Theodore Moeller MD  10/2/2024    PCP:  Savanah Richard    "

## 2024-10-04 ENCOUNTER — MYC MEDICAL ADVICE (OUTPATIENT)
Dept: GASTROENTEROLOGY | Facility: CLINIC | Age: 29
End: 2024-10-04
Payer: COMMERCIAL

## 2024-10-04 DIAGNOSIS — K51.00 ULCERATIVE PANCOLITIS WITHOUT COMPLICATION (H): Primary | ICD-10-CM

## 2024-10-28 ENCOUNTER — VIRTUAL VISIT (OUTPATIENT)
Dept: PHARMACY | Facility: CLINIC | Age: 29
End: 2024-10-28
Attending: PHYSICIAN ASSISTANT
Payer: COMMERCIAL

## 2024-10-28 VITALS — WEIGHT: 211 LBS | BODY MASS INDEX: 30.21 KG/M2 | HEIGHT: 70 IN

## 2024-10-28 DIAGNOSIS — K51.00 ULCERATIVE PANCOLITIS (H): ICD-10-CM

## 2024-10-28 RX ORDER — ZOSTER VACCINE RECOMBINANT, ADJUVANTED 50 MCG/0.5
1 KIT INTRAMUSCULAR ONCE
Qty: 0.5 ML | Refills: 1 | Status: SHIPPED | OUTPATIENT
Start: 2024-10-28 | End: 2024-10-28

## 2024-10-28 RX ORDER — USTEKINUMAB 90 MG/ML
90 INJECTION, SOLUTION SUBCUTANEOUS
Qty: 1 ML | Refills: 3 | Status: SHIPPED | OUTPATIENT
Start: 2024-10-28

## 2024-10-28 ASSESSMENT — PAIN SCALES - GENERAL: PAINLEVEL_OUTOF10: NO PAIN (0)

## 2024-10-28 NOTE — PATIENT INSTRUCTIONS
"Recommendations from today's MTM visit:                                                      Continue medications as directed.  You are up to date on labs. Your next lab work is due 11/23.  Poonam will consider the following vaccines:  Annual flu shot  Updated COVID-19 vaccine  Shingles (Shingrix 2-dose series)  Continue vitamin D 1000 units.     Follow-up: 4/28/2025 at 9:30 AM (telephone)    It was great speaking with you today.  I value your experience and would be very thankful for your time in providing feedback in our clinic survey. In the next few days, you may receive an email or text message from [employer] with a link to a survey related to your  clinical pharmacist.\"     To schedule another MTM appointment, please call the clinic directly or you may call the MTM scheduling line at [phone] or toll-free at [phone].     My Clinical Pharmacist's contact information:                                                      Please feel free to contact me with any questions or concerns you have.      Janessa BenitesD, BCPS  MTM Pharmacist   [facility]  Phone: [phone]   "

## 2024-10-28 NOTE — PROGRESS NOTES
"Virtual Visit Details    Type of service:  Telephone Visit   Phone call duration: *** minutes   Originating Location (pt. Location): {patient location:389144::\"Home\"}  {PROVIDER LOCATION On-site should be selected for visits conducted from your clinic location or adjoining [facility] hospital, academic office, or other nearby [facility] building. Off-site should be selected for all other provider locations, including home:[id number]}  Distant Location (provider location):  {virtual location provider:724111}  "

## 2024-10-28 NOTE — PROGRESS NOTES
Medication Therapy Management (MTM) Encounter    ASSESSMENT:                            Medication Adherence/Access: No issues identified.    Ulcerative Colitis:  Poonam would benefit from continued treatment with Stelara 90 mg every 8 weeks and azathioprine 150 mg once daily. They are up to date on routine maintenance labs. They are up to date on annual tuberculosis screening. No access issues for their advanced therapy are present. They are indicated for a few vaccinations which were recommended to them. Recommend continuing vitamin D supplementation. Health maintenance was not comprehensively reviewed with this visit. Due to time constraints, I was only able to assess the above with the patient today.    PLAN:                            Continue medications as directed.  You are up to date on labs. Your next lab work is due 11/23.  Poonam will consider the following vaccines:  Annual flu shot  Updated COVID-19 vaccine  Shingles (Shingrix 2-dose series)  Continue vitamin D 1000 units.     Follow-up: 4/28/2025 at 9:30 AM (telephone)    SUBJECTIVE/OBJECTIVE:                          Poonam Berg is a 29 year old female seen for a follow-up visit.       Reason for visit: Stelara/Azathioprine follow-up visit.    Allergies/ADRs: Reviewed in chart  Past Medical History: Reviewed in chart  Tobacco: She reports that she has never smoked. She has never used smokeless tobacco.  Alcohol: Less than 1 beverages / week      Medication Adherence/Access: no issues reported.    Ulcerative Colitis:   Stelara 90 mg every 8 weeks  Azathioprine 150 mg once daily- takes in morning    Met with Poonam for a routine follow-up visit.  I had met with her in January 2024 to discuss initiation of Stelara and azathioprine. She has completed Stelara IV loading dose on 2/9/2024 and first subcutaneous injection on 4/4/2024 and has administered every 8 weeks therafter with good adherence. She was also started on azathioprine 150 mg on 1/31/2024 and has  completed her monitoring protocol and has therapeutic metabolites.    PRO-2 for Ulcerative Colitis    Please select the one best answer for the patient's ability at this time     How would you rate your stool frequency over the last 3 days?    Normal (+0)    How would you rate the severity of your rectal bleeding over the last 3 days?    None: 0 points    3. Over the last week, how would you rate your general well-being?    Generally Well    Score: 0 (do not include question 3 in scoring)  0: Inactive Disease  1-1.5: Remission  6: Active disease and spontaneous bleeding    Specialty medication department: [facility]  Prior authorization status:  approved through 2025  Original start date: 2024  Last dose: 2024   Next dose: 2024    Therapeutic drug monitorin2024 6- 6-    Stelara initiation:  Week 0 (IV infusion): 2024  Week 8 (first subcutaneous): 2024     Last provider visit: 2024 JONATHAN Rodriguez  Next provider visit: 2024 JONATHAN Rodriguez  Last labs completed: 2024  Last Tb screenin2024  Lab frequency: every 3 months   - standing labs yes hepatic, crp, cbc with platelets & diff  2025  Next labs due: 2024    Last IBD Health Maintenance Review: 2024  PDC: 94%    Vaccinations:  All patients on biologics should avoid live vaccines unless specifically indicated.    -- Influenza (every year)- not on file  -- TdaP (every 10 years)- last   -- Pneumococcal Pneumonia               Prevnar-13: 2016              Pneumovax-23: 2022, 2017              Prevnar-20: not on file; eligible 2027  -- COVID-19- 1/10/2022        One time confirmation of immunity or serologies:  -- Hepatitis A (serologies or immunizations) 2014, 2013  -- Hepatitis B (serologies or immunizations)- serology indicates immunity   -- Varicella/Zoster               Varicella- 2010, 10/29/1998              Zoster- not on file  -- MMR-  "4/25/2003, 10/29/1998  -- HPV (all aged 18-26)- not on file     Due to the immunosuppression in this patient, I would not advise administration of live vaccines such as varicella/VZV, intranasal influenza, MMR, or yellow fever vaccine (if traveling).      Today's Vitals: Ht 5' 10\" (1.778 m)   Wt 211 lb (95.7 kg)   BMI 30.28 kg/m    ----------------      I spent 10 minutes with this patient today. All changes were made via collaborative practice agreement with Camilo Rodriguez PA-C. A copy of the visit note was provided to the patient's provider(s).    A summary of these recommendations was sent via [employer].    Janessa BenitesD, [facility]  MT Pharmacist   [facility]astro[facility]  Phone: [phone]    Telemedicine Visit Details  The patient's medications can be safely assessed via a telemedicine encounter.  Type of service:  Telephone visit  Originating Location (pt. Location): Home    Distant Location (provider location):  Off-site  Start Time:  9:00 AM  End Time:  9:10 AM     Medication Therapy Recommendations  Ulcerative pancolitis (H)   1 Rationale: Preventive therapy - Needs additional medication therapy - Indication   Recommendation: Order Vaccine - Shingrix 50 MCG/0.5ML Susr   Status: Accepted per CPA   Identified Date: 10/28/2024 Completed Date: 10/28/2024   Note: 3Crystal Levin will consider the following vaccines:   a. Annual flu shot   b. Updated COVID-19 vaccine   c. Shingles (Shingrix 2-dose series)              "

## 2024-10-28 NOTE — NURSING NOTE
Patient confirms medications and allergies are accurate via patients echeck in completion, and or denies any changes since last reviewed/verified.       Current patient location:  [facility]    Is the patient currently in the state of [place]? YES    Visit mode:TELEPHONE    If the visit is dropped, the patient can be reconnected by: TELEPHONE VISIT: Phone number:   Telephone Information:   Mobile [phone]       Will anyone else be joining the visit? NO  (If patient encounters technical issues they should call [phone]    Are changes needed to the allergy or medication list? No    Are refills needed on medications prescribed by this physician? NO    Rooming Documentation:  Unable to complete questionnaire(s) due to time    Reason for visit: RECHECK    Aylin CHAVESF

## 2024-12-01 ENCOUNTER — HEALTH MAINTENANCE LETTER (OUTPATIENT)
Age: 29
End: 2024-12-01

## 2024-12-13 DIAGNOSIS — K51.00 ULCERATIVE PANCOLITIS (H): ICD-10-CM

## 2024-12-17 ENCOUNTER — MYC REFILL (OUTPATIENT)
Dept: GASTROENTEROLOGY | Facility: CLINIC | Age: 29
End: 2024-12-17
Payer: COMMERCIAL

## 2024-12-17 DIAGNOSIS — K51.00 ULCERATIVE PANCOLITIS (H): ICD-10-CM

## 2024-12-18 RX ORDER — AZATHIOPRINE 50 MG/1
150 TABLET ORAL DAILY
Qty: 270 TABLET | Refills: 1 | Status: SHIPPED | OUTPATIENT
Start: 2024-12-18

## 2024-12-18 NOTE — TELEPHONE ENCOUNTER
Last clinic visit: 6/26/2024 with Camilo Rodriguez  Next clinic visit: 12/31/2024 with Camilo Rodriguez  Last set of labs: 8/23/2024  Last Quant Gold TB: negative 1/25/2024  MTM: established with Suhail     Azathioprine refill sent to pharmacy.      Per Camilo Rodriguez 6/26/2024:  -- Continue Stelara every 8 weeks  -- Continue azathioprine 150 mg daily  -- Labs to include CBC, LFTs, CRP every 3 months.  -- Next endoscopic assessment: flex sig in Aug (completed 10/2024)  -- Continue working with [facility] pharmacy (scheduled with Suhail)  -- Return to clinic in 6 months (scheduled)

## 2024-12-19 RX ORDER — AZATHIOPRINE 50 MG/1
150 TABLET ORAL DAILY
Qty: 270 TABLET | Refills: 1 | OUTPATIENT
Start: 2024-12-19

## 2024-12-27 ENCOUNTER — TELEPHONE (OUTPATIENT)
Dept: GASTROENTEROLOGY | Facility: CLINIC | Age: 29
End: 2024-12-27
Payer: COMMERCIAL

## 2024-12-27 NOTE — TELEPHONE ENCOUNTER
PA Initiation    Medication: STELARA 90 MG/ML SC SOSY  Insurance Company: [employer] - Phone [phone] Fax [phone]  Pharmacy Filling the Rx: [facility]  Filling Pharmacy Phone:    Filling Pharmacy Fax:    Start Date: 12/27/2024    [id number]

## 2024-12-30 NOTE — TELEPHONE ENCOUNTER
Prior Authorization Approval    Medication: STELARA 90 MG/ML SC SOSY  Authorization Effective Date: 11/27/2024  Authorization Expiration Date: 6/25/2026  Approved Dose/Quantity: 1/56  Reference #: [id number]   Insurance Company: [employer] - Phone [phone] Fax [phone]  Expected CoPay: $    CoPay Card Available:      Financial Assistance Needed:    Which Pharmacy is filling the prescription: [facility]  Pharmacy Notified:    Patient Notified:

## 2024-12-31 ENCOUNTER — DOCUMENTATION ONLY (OUTPATIENT)
Dept: GASTROENTEROLOGY | Facility: CLINIC | Age: 29
End: 2024-12-31

## 2024-12-31 ENCOUNTER — VIRTUAL VISIT (OUTPATIENT)
Dept: GASTROENTEROLOGY | Facility: CLINIC | Age: 29
End: 2024-12-31
Attending: PHYSICIAN ASSISTANT
Payer: COMMERCIAL

## 2024-12-31 VITALS — HEIGHT: 70 IN | WEIGHT: 211 LBS | BODY MASS INDEX: 30.21 KG/M2

## 2024-12-31 DIAGNOSIS — K51.00 ULCERATIVE PANCOLITIS WITHOUT COMPLICATION (H): Primary | ICD-10-CM

## 2024-12-31 PROCEDURE — 99214 OFFICE O/P EST MOD 30 MIN: CPT | Mod: 95 | Performed by: PHYSICIAN ASSISTANT

## 2024-12-31 ASSESSMENT — PAIN SCALES - GENERAL: PAINLEVEL_OUTOF10: NO PAIN (0)

## 2024-12-31 NOTE — LETTER
12/31/2024      Poonam Berg  [address] [facility] [address]      Dear Colleague,    Thank you for referring your patient, Poonam Berg, to the [facility] [place]. Please see a copy of my visit note below.    Virtual Visit Details    Type of service:  Video Visit     Originating Location (pt. Location): Home    Distant Location (provider location):  Off-site  Platform used for Video Visit: [facility]    IBD CLINIC VISIT   ASSESSMENT/PLAN    29 year old female with Ulcerative pancolitis     1. Ulcerative colitis, pancolitis:   Current UC medications:        -- Stelara (infusion 2/9/24, injection 4/5/24)        -- Azathioprine 150 mg daily   Current clinical disease activity: Rocha score: 0  Last endoscopic disease activity: Flex sig 10/2024 Rocha 1 in rectum, overall impression is patient is in endoscopic remission. Bx show normal left colon bx and focal active inflammation in rectum, no dysplasia.    Patient has achieved endoscopic and clinical remission on current regimen.  We will continue with this plan.    -- Continue Stelara every 8 weeks  -- Continue azathioprine 150 mg daily  -- Labs to include CBC, LFTs, CRP every 3 months. DUE  -- Continue working with [facility] pharmacy    Camilo Rodriguez PA-C  Division of Gastroenterology, Hepatology and Nutrition  [facility]     2. Iron deficiency anemia, continue to monitor, she is due for labs Celiac serology negative. Could be slow occult bleeding from pseudopolyps.  At this time expect she will have recurrent iron deficiency anemia given her colitis  -- completed iron infusions    3. History of C Diff infection: 2018 and 2022.  December 22 was discordant testing.  See discussion above regarding colonization and marker of severe colitis versus infection.  --Avoid FMT in the future given possibility of stimulating ulcerative colitis flare    4. Dysplasia surveillance: Last colonoscopy 5/2023.  Due every 2 years, Fall  "2025.    Return to clinic in 6 months.    Thank you for this consultation.  It was a pleasure to participate in the care of this patient; please contact us with any further questions.      Camilo Rodriguez PA-C  Division of Gastroenterology, Hepatology and Nutrition  [facility]     IBD HISTORY  Age at diagnosis: 20  Extent of disease: pancolitis  Prior UC surgeries:  Prior IBD Medications:   - Canasa   - Infliximab 5mg/kg (400mg) q6   - Azathioprine 150mg (sepetmebr 2018 - Summer 2022) - de-escalated due to    - Adalimumab weekly (started Nov 2018, dose escalated Nov 2019)   - Vedolizumab: Fall 2022 - May 2023 - loss of respose q8 dosing   - Rinvoq 45 mg: persistent moderate disease on flex sig     DRUG MONITORING  TPMT enzyme activity:      6-TGN/6-MMPN levels:  7/15/20: 6-TGN: 137, 6MMPN 3888     Biologic concentration:  7/3/18: IFX: <1, Ab 30 (normal < 50) - Rocha test (q8 weeks, monotherapy)  11/5/18: IFX: Not detected, antibodies: 14     11/15/19: Adalimumab: 3.2, no antibodies     HPI:   She is now having 2-3 stools per day. Stools are formed.  No urgency or nighttime stools. No blood in the stool. She has been compliant with stelara every 8 weeks and azathioprine 150 mg daily with good compliance.   No new EIM.    Extra intestinal manifestations of IBD:  No uveitis/episcleritis  No: aphthous ulcers   No arthritis   No erythema nodosum/pyoderma gangrenosum.     Nutrition goals:  Not limiting anything at this time.    sIBDQ:  IBDQ Score Date IBDQ - Total Score  (Higher score better)   6/26/2024  10:38 AM 65   3/14/2023   8:55 AM 62   11/15/2019  11:36 AM 57        ROS:    Constitutional, HEENT, cardiovascular, pulmonary, GI, , musculoskeletal, neuro, skin, endocrine and psych systems are negative, except as otherwise noted.    PHYSICAL EXAMINATION:  Constitutional: aaox3, cooperative, pleasant, not dyspneic/diaphoretic, no acute distress  Vitals reviewed: Ht 1.778 m (5' 10\")   Wt 95.7 kg (211 lb) "   BMI 30.28 kg/m    Wt:   Wt Readings from Last 2 Encounters:   12/31/24 95.7 kg (211 lb)   10/28/24 95.7 kg (211 lb)      Constitutional - general appearance is well and in no acute distress. Body habitus normal  Eyes - No redness or discharge  Respiratory - No cough, unlabored breathing  Musculoskeletal - range of motion intact: Neck and arms  Skin - No discoloration or lesions  Neurological - No tremors, headaches  Psychiatric - No anxiety, alert & oriented    PERTINENT PAST MEDICAL HISTORY:  Past Medical History:   Diagnosis Date     Ulcerative colitis (H)        PREVIOUS SURGERIES:  Past Surgical History:   Procedure Laterality Date     COLONOSCOPY N/A 5/31/2023    Procedure: COLONOSCOPY, WITH BIOPSY;  Surgeon: Gabino Krueger MD;  Location: [facility] OR       ALLERGIES:     Allergies   Allergen Reactions     Infliximab Nausea and Other (See Comments)     FACE FLUSHING WITH ITCHY/SCRATCHY THROAT.        PERTINENT MEDICATIONS:    Current Outpatient Medications:      azaTHIOprine (IMURAN) 50 MG tablet, Take 3 tablets (150 mg) by mouth daily., Disp: 270 tablet, Rfl: 1     EPINEPHrine (ANY BX GENERIC EQUIV) 0.3 MG/0.3ML injection 2-pack, Inject 0.3 mLs (0.3 mg) into the muscle as needed for anaphylaxis May repeat one time in 5-15 minutes if response to initial dose is inadequate., Disp: 2 each, Rfl: 0     multivitamin w/minerals (THERA-VIT-M) tablet, Take 1 tablet by mouth daily, Disp: , Rfl:      ustekinumab (STELARA) 90 MG/ML, Inject 1 mL (90 mg) subcutaneously once every eight weeks., Disp: 1 mL, Rfl: 3    SOCIAL HISTORY:  Social History     Socioeconomic History     Marital status:      Spouse name: Not on file     Number of children: Not on file     Years of education: Not on file     Highest education level: Not on file   Occupational History     Not on file   Tobacco Use     Smoking status: Never     Smokeless tobacco: Never   Vaping Use     Vaping status: Never Used   Substance and Sexual Activity      Alcohol use: Not Currently     Comment: 1-2 monthly     Drug use: Not Currently     Sexual activity: Not on file   Other Topics Concern     Not on file   Social History Narrative     Not on file     Social Drivers of Health     Financial Resource Strain: Not on file   Food Insecurity: Not on file   Transportation Needs: Not on file   Physical Activity: Not on file   Stress: Not on file   Social Connections: Not on file   Interpersonal Safety: Low Risk  (10/2/2024)    Interpersonal Safety      Do you feel physically and emotionally safe where you currently live?: Yes      Within the past 12 months, have you been hit, slapped, kicked or otherwise physically hurt by someone?: No      Within the past 12 months, have you been humiliated or emotionally abused in other ways by your partner or ex-partner?: No   Housing Stability: Not on file     FAMILY HISTORY:  No FH of IBD/CRC.  No family history on file.      Again, thank you for allowing me to participate in the care of your patient.        Sincerely,        Camilo Rodriguez PA-C    Electronically signed

## 2024-12-31 NOTE — PROGRESS NOTES
Virtual Visit Details    Type of service:  Video Visit     Originating Location (pt. Location): Home    Distant Location (provider location):  Off-site  Platform used for Video Visit: [facility]    IBD CLINIC VISIT   ASSESSMENT/PLAN    29 year old female with Ulcerative pancolitis     1. Ulcerative colitis, pancolitis:   Current UC medications:        -- Stelara (infusion 2/9/24, injection 4/5/24)        -- Azathioprine 150 mg daily   Current clinical disease activity: Rocha score: 0  Last endoscopic disease activity: Flex sig 10/2024 Rocha 1 in rectum, overall impression is patient is in endoscopic remission. Bx show normal left colon bx and focal active inflammation in rectum, no dysplasia.    Patient has achieved endoscopic and clinical remission on current regimen.  We will continue with this plan.    -- Continue Stelara every 8 weeks  -- Continue azathioprine 150 mg daily  -- Labs to include CBC, LFTs, CRP every 3 months. DUE  -- Continue working with [facility] pharmacy    Camilo Rodriguez PA-C  Division of Gastroenterology, Hepatology and Nutrition  [facility]     2. Iron deficiency anemia, continue to monitor, she is due for labs Celiac serology negative. Could be slow occult bleeding from pseudopolyps.  At this time expect she will have recurrent iron deficiency anemia given her colitis  -- completed iron infusions    3. History of C Diff infection: 2018 and 2022.  December 22 was discordant testing.  See discussion above regarding colonization and marker of severe colitis versus infection.  --Avoid FMT in the future given possibility of stimulating ulcerative colitis flare    4. Dysplasia surveillance: Last colonoscopy 5/2023.  Due every 2 years, Fall 2025.    Return to clinic in 6 months.    Thank you for this consultation.  It was a pleasure to participate in the care of this patient; please contact us with any further questions.      Camilo Rodriguez PA-C  Division of Gastroenterology, Hepatology and  "Nutrition  [facility]     IBD HISTORY  Age at diagnosis: 20  Extent of disease: pancolitis  Prior UC surgeries:  Prior IBD Medications:   - Canasa   - Infliximab 5mg/kg (400mg) q6   - Azathioprine 150mg (sepetmebr 2018 - Summer 2022) - de-escalated due to    - Adalimumab weekly (started Nov 2018, dose escalated Nov 2019)   - Vedolizumab: Fall 2022 - May 2023 - loss of respose q8 dosing   - Rinvoq 45 mg: persistent moderate disease on flex sig     DRUG MONITORING  TPMT enzyme activity:      6-TGN/6-MMPN levels:  7/15/20: 6-TGN: 137, 6MMPN 3888     Biologic concentration:  7/3/18: IFX: <1, Ab 30 (normal < 50) - Rocha test (q8 weeks, monotherapy)  11/5/18: IFX: Not detected, antibodies: 14     11/15/19: Adalimumab: 3.2, no antibodies     HPI:   She is now having 2-3 stools per day. Stools are formed.  No urgency or nighttime stools. No blood in the stool. She has been compliant with stelara every 8 weeks and azathioprine 150 mg daily with good compliance.   No new EIM.    Extra intestinal manifestations of IBD:  No uveitis/episcleritis  No: aphthous ulcers   No arthritis   No erythema nodosum/pyoderma gangrenosum.     Nutrition goals:  Not limiting anything at this time.    sIBDQ:  IBDQ Score Date IBDQ - Total Score  (Higher score better)   6/26/2024  10:38 AM 65   3/14/2023   8:55 AM 62   11/15/2019  11:36 AM 57        ROS:    Constitutional, HEENT, cardiovascular, pulmonary, GI, , musculoskeletal, neuro, skin, endocrine and psych systems are negative, except as otherwise noted.    PHYSICAL EXAMINATION:  Constitutional: aaox3, cooperative, pleasant, not dyspneic/diaphoretic, no acute distress  Vitals reviewed: Ht 1.778 m (5' 10\")   Wt 95.7 kg (211 lb)   BMI 30.28 kg/m    Wt:   Wt Readings from Last 2 Encounters:   12/31/24 95.7 kg (211 lb)   10/28/24 95.7 kg (211 lb)      Constitutional - general appearance is well and in no acute distress. Body habitus normal  Eyes - No redness or " discharge  Respiratory - No cough, unlabored breathing  Musculoskeletal - range of motion intact: Neck and arms  Skin - No discoloration or lesions  Neurological - No tremors, headaches  Psychiatric - No anxiety, alert & oriented    PERTINENT PAST MEDICAL HISTORY:  Past Medical History:   Diagnosis Date    Ulcerative colitis (H)        PREVIOUS SURGERIES:  Past Surgical History:   Procedure Laterality Date    COLONOSCOPY N/A 5/31/2023    Procedure: COLONOSCOPY, WITH BIOPSY;  Surgeon: Gabino Krueger MD;  Location: [facility] OR       ALLERGIES:     Allergies   Allergen Reactions    Infliximab Nausea and Other (See Comments)     FACE FLUSHING WITH ITCHY/SCRATCHY THROAT.        PERTINENT MEDICATIONS:    Current Outpatient Medications:     azaTHIOprine (IMURAN) 50 MG tablet, Take 3 tablets (150 mg) by mouth daily., Disp: 270 tablet, Rfl: 1    EPINEPHrine (ANY BX GENERIC EQUIV) 0.3 MG/0.3ML injection 2-pack, Inject 0.3 mLs (0.3 mg) into the muscle as needed for anaphylaxis May repeat one time in 5-15 minutes if response to initial dose is inadequate., Disp: 2 each, Rfl: 0    multivitamin w/minerals (THERA-VIT-M) tablet, Take 1 tablet by mouth daily, Disp: , Rfl:     ustekinumab (STELARA) 90 MG/ML, Inject 1 mL (90 mg) subcutaneously once every eight weeks., Disp: 1 mL, Rfl: 3    SOCIAL HISTORY:  Social History     Socioeconomic History    Marital status:      Spouse name: Not on file    Number of children: Not on file    Years of education: Not on file    Highest education level: Not on file   Occupational History    Not on file   Tobacco Use    Smoking status: Never    Smokeless tobacco: Never   Vaping Use    Vaping status: Never Used   Substance and Sexual Activity    Alcohol use: Not Currently     Comment: 1-2 monthly    Drug use: Not Currently    Sexual activity: Not on file   Other Topics Concern    Not on file   Social History Narrative    Not on file     Social Drivers of Health     Financial Resource Strain:  Not on file   Food Insecurity: Not on file   Transportation Needs: Not on file   Physical Activity: Not on file   Stress: Not on file   Social Connections: Not on file   Interpersonal Safety: Low Risk  (10/2/2024)    Interpersonal Safety     Do you feel physically and emotionally safe where you currently live?: Yes     Within the past 12 months, have you been hit, slapped, kicked or otherwise physically hurt by someone?: No     Within the past 12 months, have you been humiliated or emotionally abused in other ways by your partner or ex-partner?: No   Housing Stability: Not on file     FAMILY HISTORY:  No FH of IBD/CRC.  No family history on file.

## 2024-12-31 NOTE — NURSING NOTE
Current patient location: Patient declined to provide     Is the patient currently in the state of [place]? YES    Visit mode:VIDEO    If the visit is dropped, the patient can be reconnected by:VIDEO VISIT: Text to cell phone:   Telephone Information:   Mobile [phone]       Will anyone else be joining the visit? NO  (If patient encounters technical issues they should call [phone]    Are changes needed to the allergy or medication list? No    Are refills needed on medications prescribed by this physician? NO    Rooming Documentation:  Questionnaire(s) not pre-assigned    Reason for visit: RECHECK (IBD)    Tracey ROCKWELL

## 2024-12-31 NOTE — PROGRESS NOTES
Per Camilo Rodriguez, standing labs (CBC, CRP, hepatic panel) requested to be faxed to [facility] in [facility]. [facility] sent to clinic support pool.

## 2024-12-31 NOTE — PROGRESS NOTES
Faxed lab orders, CBC, CPR, Hepatic panel to [facility] on 12/31/2024@ 11:20 a.m.    Fax: [phone]    Han Hdz MA

## 2024-12-31 NOTE — PATIENT INSTRUCTIONS
It was a pleasure taking care of you today.  I've included a brief summary of our discussion and care plan from today's visit below.  Please review this information with your primary care provider.  ______________________________________________________________________    My recommendations are summarized as follows:    -- Continue Stelara every 8 weeks  --Continue azathioprine 150 mg daily  -- Labs every 3 months  -- Next endoscopic assessment: Colonoscopy next year  -- Patient with IBD we recommend supplementation vitamin D 1000 units daily and calcium 500 mg twice daily.  -- Vaccines/immunizations to be updated: flu and updated covid shot  -- No NSAIDs (ibuprofen, or anything containing ibuprofen)    For additional resources about inflammatory bowel disease visit http://www.crohnscolitisfoundation.org/    To learn more about Diet and Nutrition in the setting of IBD, check out some of these resources:  https://www.crohnscolitisfoundation.org/diet-and-nutrition/what-should-i-eat  https://www.nimbal.org/  https://ntforibd.org/      Return to GI Clinic in 6 months to review your progress.    ______________________________________________________________________    How do I schedule labs, imaging studies, or procedures that were ordered in clinic today?     Labs: To schedule lab appointment at the [facility], use my chart or call [phone]. If you have a [place] lab closer to home where you are regularly seen you can give them a call.     Procedures: If a colonoscopy, upper endoscopy, breath test, esophageal manometry, or pH impedence was ordered today, our endoscopy team will call you to schedule this. If you have not heard from our endoscopy team within a week, please call [phone] to schedule.     Imaging Studies: If you were scheduled for a CT scan, X-ray, MRI, ultrasound, HIDA scan or other imaging study, please call [phone] to have this scheduled.     Referral: If a referral to  another specialty was ordered, expect a phone call or follow instructions above. If you have not heard from anyone regarding your referral in a week, please call our clinic to check the status.     Who do I call with any questions after my visit?  Please be in touch if there are any further questions that arise following today's visit.  There are multiple ways to contact your gastroenterology care team.      During business hours, you may reach a Gastroenterology nurse at [phone]    To schedule or reschedule an appointment, please call [phone].     You can always send a secure message through [employer].  [employer] messages are answered by your nurse or doctor typically within 24 hours.  Please allow extra time on weekends and holidays.      For urgent/emergent questions after business hours, you may reach the on-call GI Fellow by contacting the [facility]  at [phone].     How will I get the results of any tests ordered?    You will receive all of your results.  If you have signed up for [employer], any tests ordered at your visit will be available to you after your physician reviews them.  Typically this takes 1-2 weeks.  If there are urgent results that require a change in your care plan, your physician or nurse will call you to discuss the next steps.      What is [employer]?  [employer] is a secure way for you to access all of your healthcare records from the [facility].  It is a web based computer program, so you can sign on to it from any location.  It also allows you to send secure messages to your care team.  I recommend signing up for [employer] access if you have not already done so and are comfortable with using a computer.         Sincerely,    Camilo Rodriguez PA-C  [facility]  Division of Gastroenterology

## 2025-02-10 ENCOUNTER — VIRTUAL VISIT (OUTPATIENT)
Dept: ENDOCRINOLOGY | Facility: CLINIC | Age: 30
End: 2025-02-10
Payer: COMMERCIAL

## 2025-02-10 VITALS — BODY MASS INDEX: 31.5 KG/M2 | WEIGHT: 220 LBS | HEIGHT: 70 IN

## 2025-02-10 DIAGNOSIS — K51.00 ULCERATIVE PANCOLITIS WITHOUT COMPLICATION (H): ICD-10-CM

## 2025-02-10 PROCEDURE — 98001 SYNCH AUDIO-VIDEO NEW LOW 30: CPT | Performed by: INTERNAL MEDICINE

## 2025-02-10 ASSESSMENT — SLEEP AND FATIGUE QUESTIONNAIRES
HOW LIKELY ARE YOU TO NOD OFF OR FALL ASLEEP IN A CAR, WHILE STOPPED FOR A FEW MINUTES IN TRAFFIC: WOULD NEVER DOZE
HOW LIKELY ARE YOU TO NOD OFF OR FALL ASLEEP WHILE SITTING INACTIVE IN A PUBLIC PLACE: WOULD NEVER DOZE
HOW LIKELY ARE YOU TO NOD OFF OR FALL ASLEEP WHILE SITTING AND TALKING TO SOMEONE: WOULD NEVER DOZE
HOW LIKELY ARE YOU TO NOD OFF OR FALL ASLEEP WHILE SITTING AND READING: WOULD NEVER DOZE
HOW LIKELY ARE YOU TO NOD OFF OR FALL ASLEEP WHEN YOU ARE A PASSENGER IN A CAR FOR AN HOUR WITHOUT A BREAK: WOULD NEVER DOZE
HOW LIKELY ARE YOU TO NOD OFF OR FALL ASLEEP WHILE SITTING QUIETLY AFTER LUNCH WITHOUT ALCOHOL: WOULD NEVER DOZE
HOW LIKELY ARE YOU TO NOD OFF OR FALL ASLEEP WHILE WATCHING TV: SLIGHT CHANCE OF DOZING
HOW LIKELY ARE YOU TO NOD OFF OR FALL ASLEEP WHILE LYING DOWN TO REST IN THE AFTERNOON WHEN CIRCUMSTANCES PERMIT: SLIGHT CHANCE OF DOZING

## 2025-02-10 ASSESSMENT — PAIN SCALES - GENERAL: PAINLEVEL_OUTOF10: NO PAIN (0)

## 2025-02-10 NOTE — NURSING NOTE
Current patient location: car     Is the patient currently in the state of [place]? YES    Visit mode: TELEPHONE    If the visit is dropped, the patient can be reconnected by:VIDEO VISIT: Text to cell phone:   Telephone Information:   Mobile [phone]       Will anyone else be joining the visit? NO  (If patient encounters technical issues they should call [phone])    Are changes needed to the allergy or medication list? Pt stated no changes to allergies and Pt stated no med changes    Are refills needed on medications prescribed by this physician? NO    Rooming Documentation:  Questionnaire(s) completed      Reason for visit: Consult    Wt other than 24 hrs:  Month ago   Pain more than one location:  no  Glo ROCKWELL

## 2025-02-10 NOTE — LETTER
"2/10/2025       RE: Poonam Berg  [address] [facility] [address]     Dear Colleague,    Thank you for referring your patient, Poonam Berg, to the [facility]NA[facility] [place] at [facility]. Please see a copy of my visit note below.    Virtual Visit Details    Joined the call at invalid date.  Left the call at 2/10/2025, 10:50:11 a    Originating Location (pt. Location): Home    Distant Location (provider location):  Off-site  Platform used for Video Visit: [facility] Medical Weight Management Consult    PATIENT:  Poonam Berg  MRN:         [id number]  :         1995  VIVEK:         2/10/2025    Dear CHAZ CROCKETT,    I had the pleasure of seeing your patient, Poonam Berg.  Full intake/assessment done to determine barriers to weight loss success and develop a treatment plan.  Poonam Berg is a 29 year old female interested in treatment of medical problems associated with weight.  Her weight today is 220 lbs 0 oz, Body mass index is 31.57 kg/m ., and she has the following co-morbidities:      2025     6:17 PM   --   I have the following health issues associated with obesity None of the above   I have the following symptoms associated with obesity Fatigue    Irregular Menstral Cycle            No data to display                    2025     6:17 PM   Referring Provider   Please name the provider who referred you to Medical Weight Management  If you do not know, please answer \"I Don't Know\" Camilo Rodriguez       Wt Readings from Last 4 Encounters:   02/10/25 99.8 kg (220 lb)   24 95.7 kg (211 lb)   10/28/24 95.7 kg (211 lb)   10/02/24 91.2 kg (201 lb)           2025     6:17 PM   Weight History   How concerned are you about your weight? Very Concerned   I became overweight As a Child   The following factors have contributed to my weight gain Started on Medication that Caused Weight Gain    Eating Wrong Types of Food "    Eating Too Much    Lack of Exercise    Genetic (Runs in the Family)   I have tried the following methods to lose weight Watching Portions or Calories    Exercise    Meal Replacements   My lowest weight since age 20 was 134   My highest weight since age 20 was 220   The most weight I have ever lost was (lbs) 56   I have the following family history of obesity/being overweight My mother is overweight    My father is overweight    Many of my relatives are overweight   How has your weight changed over the last year? Gained   How many pounds? 55           2/8/2025     6:17 PM   Diet Recall   Glass juice/day 0   Glass milk/day 0   Glass sugary drink/day 0   How many cans/bottles of sugar pop/soda/tea/sports drinks do you drink in a day? 0   Diet drink/day 1   Alcohol Monthly or Less   Drinks/day 1 or 2           2/8/2025     6:17 PM   Eating Habits   Generally, my meals include foods like these bread, pasta, rice, potatoes, corn, crackers, sweet dessert, pop, or juice Almost Everyday   Generally, my meals include foods like these fried meats, brats, burgers, french fries, pizza, cheese, chips, or ice cream Almost Everyday   Eat fast food (like [employer], [employer], Taco Bell) Once a Week   Eat at a buffet or sit-down restaurant Less Than Weekly   Eat most of my meals in front of the TV or computer Almost Everyday   Often skip meals, eat at random times, have no regular eating times Never   Rarely sit down for a meal but snack or graze throughout Never   Eat extra snacks between meals Almost Everyday   Eat most of my food at the end of the day Almost Everyday   Eat in the middle of the night or wake up at night to eat Never   Eat extra snacks to prevent or correct low blood sugar Never   Eat to prevent acid reflux or stomach pain Never   Worry about not having enough food to eat Never   I eat when I am depressed Never   I eat when I am stressed A Few Times a Week   I eat when I am bored A Few Times a Week   I eat when  I am anxious Never   I eat when I am happy or as a reward Once a Week   I feel hungry all the time even if I just have eaten Never   Feeling full is important to me Everyday   I finish all the food on my plate even if I am already full A Few Times a Week   I can't resist eating delicious food or walk past the good food/smell A Few Times a Week   I eat/snack without noticing that I am eating Never   I eat when I am preparing the meal A Few Times a Week   I eat more than usual when I see others eating A Few Times a Week   I have trouble not eating sweets, ice cream, cookies, or chips if they are around the house Almost Everyday   I think about food all day A Few Times a Week   What foods, if any, do you crave? Sweets/Candy/Chocolate   Please list any other foods you crave? Cheese, deep fried food           2/8/2025     6:17 PM   Activity/Exercise History   How much of a typical 12 hour day do you spend sitting? Most of the Day   How much of a typical 12 hour day do you spend lying down? Less Than Half the Day   How much of a typical day do you spend walking/standing? Less Than Half the Day   How many hours (not including work) do you spend on the TV/Video Games/Computer/Tablet/Phone? 2-3 Hours   How many times a week are you active for the purpose of exercise? Never   What keeps you from being more active? Lack of Time    Too tired    Unsure What To Do   How many total minutes do you spend doing some activity for the purpose of exercising when you exercise? 15-30 Minutes       PAST MEDICAL HISTORY:  Past Medical History:   Diagnosis Date     Ulcerative colitis (H)            2/8/2025     6:17 PM   Work/Social History Reviewed With Patient   My employment status is Full-Time   My job is [employer]    How much of your job is spent on the computer or phone? 100%   How many hours do you spend commuting to work daily? 2   What is your marital status? /In a Relationship   If in a relationship, is your  "significant other overweight? No   Who do you live with? My    Who does the food shopping? I do           2/8/2025     6:17 PM   Mental Health History Reviewed With Patient   Have you ever been physically or sexually abused? No   How often in the past 2 weeks have you felt little interest or pleasure in doing things? Not at all   Over the past 2 weeks how often have you felt down, depressed, or hopeless? Not at all           2/8/2025     6:17 PM   Sleep History Reviewed With Patient   How many hours do you sleep at night? 8       MEDICATIONS:   Current Outpatient Medications   Medication Sig Dispense Refill     azaTHIOprine (IMURAN) 50 MG tablet Take 3 tablets (150 mg) by mouth daily. 270 tablet 1     EPINEPHrine (ANY BX GENERIC EQUIV) 0.3 MG/0.3ML injection 2-pack Inject 0.3 mLs (0.3 mg) into the muscle as needed for anaphylaxis May repeat one time in 5-15 minutes if response to initial dose is inadequate. 2 each 0     multivitamin w/minerals (THERA-VIT-M) tablet Take 1 tablet by mouth daily       ustekinumab (STELARA) 90 MG/ML Inject 1 mL (90 mg) subcutaneously once every eight weeks. 1 mL 3       ALLERGIES:   Allergies   Allergen Reactions     Infliximab Nausea and Other (See Comments)     FACE FLUSHING WITH ITCHY/SCRATCHY THROAT.        PHYSICAL EXAM:  Ht 1.778 m (5' 10\")   Wt 99.8 kg (220 lb)   BMI 31.57 kg/m     A & O x 3  HEENT: NCAT, mucous membranes moist  Respirations unlabored  Location of obesity: Mixed Obesity    ASSESSMENT:  Poonam is a patient with early onset class 1 obesity with significant element of familial/genetic influence and without current health consequences. She does need aggressive weight loss plan due to fatigue.  Says has gained 50 lb in last year since UC under control (no prednisone). 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    Poonam LONGORIA Klym eats a high carb diet, eats a high fat diet, and has a disorganized meal pattern. Meat and starches for supper, rare veg or " fruit. Snacks a few times a day on cheese and such. Protein shake for late breakfast.    Her problem is complicated by a hunger disorder and gender and short stature    Her ability to lose weight is impacted by lack of confidence and lack of education on nutrition and dietary needs.    PLAN:    Meal planning - focus on no between meal snacking, aggressive lowering of starches and cheese    Strong genetic or hunger disorder  Ancillary testing:  N/A.  Food Plan:  focus on no between meal snacking, aggressive lowering of starches and cheese.  Activity Plan:  Activity journal.  Supplementary:  N/A.  Medication:  The patient will begin medication in pursuit of improved medical status as influenced by body weight. She will start Qsymia.  There is a mutual understanding of the goals and risks of this therapy. The patient is in agreement. She is educated on dosage regimen and possible side effects.    RTC:    12 weeks.    Sincerely,  Albetr Mccann MD            Again, thank you for allowing me to participate in the care of your patient.      Sincerely,    Albert Mccann MD

## 2025-02-10 NOTE — PROGRESS NOTES
Virtual Visit Details    Joined the call at invalid date.  Left the call at 2/10/2025, 10:50:11 a    Originating Location (pt. Location): Home    Distant Location (provider location):  Off-site  Platform used for Video Visit: [employer]

## 2025-02-10 NOTE — PROGRESS NOTES
"    New Medical Weight Management Consult    PATIENT:  Poonam Berg  MRN:         [id number]  :         1995  VIVEK:         2/10/2025    Dear CHAZ CROCKETT,    I had the pleasure of seeing your patient, Poonam Berg.  Full intake/assessment done to determine barriers to weight loss success and develop a treatment plan.  Poonam Berg is a 29 year old female interested in treatment of medical problems associated with weight.  Her weight today is 220 lbs 0 oz, Body mass index is 31.57 kg/m ., and she has the following co-morbidities:      2025     6:17 PM   --   I have the following health issues associated with obesity None of the above   I have the following symptoms associated with obesity Fatigue    Irregular Menstral Cycle            No data to display                    2025     6:17 PM   Referring Provider   Please name the provider who referred you to Medical Weight Management  If you do not know, please answer \"I Don't Know\" Camilo Rodriguez       Wt Readings from Last 4 Encounters:   02/10/25 99.8 kg (220 lb)   24 95.7 kg (211 lb)   10/28/24 95.7 kg (211 lb)   10/02/24 91.2 kg (201 lb)           2025     6:17 PM   Weight History   How concerned are you about your weight? Very Concerned   I became overweight As a Child   The following factors have contributed to my weight gain Started on Medication that Caused Weight Gain    Eating Wrong Types of Food    Eating Too Much    Lack of Exercise    Genetic (Runs in the Family)   I have tried the following methods to lose weight Watching Portions or Calories    Exercise    Meal Replacements   My lowest weight since age 20 was 134   My highest weight since age 20 was 220   The most weight I have ever lost was (lbs) 56   I have the following family history of obesity/being overweight My mother is overweight    My father is overweight    Many of my relatives are overweight   How has your weight changed over the last year? Gained   How many pounds? " 55           2/8/2025     6:17 PM   Diet Recall   Glass juice/day 0   Glass milk/day 0   Glass sugary drink/day 0   How many cans/bottles of sugar pop/soda/tea/sports drinks do you drink in a day? 0   Diet drink/day 1   Alcohol Monthly or Less   Drinks/day 1 or 2           2/8/2025     6:17 PM   Eating Habits   Generally, my meals include foods like these bread, pasta, rice, potatoes, corn, crackers, sweet dessert, pop, or juice Almost Everyday   Generally, my meals include foods like these fried meats, brats, burgers, french fries, pizza, cheese, chips, or ice cream Almost Everyday   Eat fast food (like [employer], [employer], Taco Bell) Once a Week   Eat at a buffet or sit-down restaurant Less Than Weekly   Eat most of my meals in front of the TV or computer Almost Everyday   Often skip meals, eat at random times, have no regular eating times Never   Rarely sit down for a meal but snack or graze throughout Never   Eat extra snacks between meals Almost Everyday   Eat most of my food at the end of the day Almost Everyday   Eat in the middle of the night or wake up at night to eat Never   Eat extra snacks to prevent or correct low blood sugar Never   Eat to prevent acid reflux or stomach pain Never   Worry about not having enough food to eat Never   I eat when I am depressed Never   I eat when I am stressed A Few Times a Week   I eat when I am bored A Few Times a Week   I eat when I am anxious Never   I eat when I am happy or as a reward Once a Week   I feel hungry all the time even if I just have eaten Never   Feeling full is important to me Everyday   I finish all the food on my plate even if I am already full A Few Times a Week   I can't resist eating delicious food or walk past the good food/smell A Few Times a Week   I eat/snack without noticing that I am eating Never   I eat when I am preparing the meal A Few Times a Week   I eat more than usual when I see others eating A Few Times a Week   I have trouble not  eating sweets, ice cream, cookies, or chips if they are around the house Almost Everyday   I think about food all day A Few Times a Week   What foods, if any, do you crave? Sweets/Candy/Chocolate   Please list any other foods you crave? Cheese, deep fried food           2/8/2025     6:17 PM   Activity/Exercise History   How much of a typical 12 hour day do you spend sitting? Most of the Day   How much of a typical 12 hour day do you spend lying down? Less Than Half the Day   How much of a typical day do you spend walking/standing? Less Than Half the Day   How many hours (not including work) do you spend on the TV/Video Games/Computer/Tablet/Phone? 2-3 Hours   How many times a week are you active for the purpose of exercise? Never   What keeps you from being more active? Lack of Time    Too tired    Unsure What To Do   How many total minutes do you spend doing some activity for the purpose of exercising when you exercise? 15-30 Minutes       PAST MEDICAL HISTORY:  Past Medical History:   Diagnosis Date    Ulcerative colitis (H)            2/8/2025     6:17 PM   Work/Social History Reviewed With Patient   My employment status is Full-Time   My job is [employer]    How much of your job is spent on the computer or phone? 100%   How many hours do you spend commuting to work daily? 2   What is your marital status? /In a Relationship   If in a relationship, is your significant other overweight? No   Who do you live with? My    Who does the food shopping? I do           2/8/2025     6:17 PM   Mental Health History Reviewed With Patient   Have you ever been physically or sexually abused? No   How often in the past 2 weeks have you felt little interest or pleasure in doing things? Not at all   Over the past 2 weeks how often have you felt down, depressed, or hopeless? Not at all           2/8/2025     6:17 PM   Sleep History Reviewed With Patient   How many hours do you sleep at night? 8       MEDICATIONS:  "  Current Outpatient Medications   Medication Sig Dispense Refill    azaTHIOprine (IMURAN) 50 MG tablet Take 3 tablets (150 mg) by mouth daily. 270 tablet 1    EPINEPHrine (ANY BX GENERIC EQUIV) 0.3 MG/0.3ML injection 2-pack Inject 0.3 mLs (0.3 mg) into the muscle as needed for anaphylaxis May repeat one time in 5-15 minutes if response to initial dose is inadequate. 2 each 0    multivitamin w/minerals (THERA-VIT-M) tablet Take 1 tablet by mouth daily      ustekinumab (STELARA) 90 MG/ML Inject 1 mL (90 mg) subcutaneously once every eight weeks. 1 mL 3       ALLERGIES:   Allergies   Allergen Reactions    Infliximab Nausea and Other (See Comments)     FACE FLUSHING WITH ITCHY/SCRATCHY THROAT.        PHYSICAL EXAM:  Ht 1.778 m (5' 10\")   Wt 99.8 kg (220 lb)   BMI 31.57 kg/m     A & O x 3  HEENT: NCAT, mucous membranes moist  Respirations unlabored  Location of obesity: Mixed Obesity    ASSESSMENT:  Poonam is a patient with early onset class 1 obesity with significant element of familial/genetic influence and without current health consequences. She does need aggressive weight loss plan due to fatigue.  Says has gained 50 lb in last year since UC under control (no prednisone). 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    Poonam Berg eats a high carb diet, eats a high fat diet, and has a disorganized meal pattern. Meat and starches for supper, rare veg or fruit. Snacks a few times a day on cheese and such. Protein shake for late breakfast.    Her problem is complicated by a hunger disorder and gender and short stature    Her ability to lose weight is impacted by lack of confidence and lack of education on nutrition and dietary needs.    PLAN:    Meal planning - focus on no between meal snacking, aggressive lowering of starches and cheese    Strong genetic or hunger disorder  Ancillary testing:  N/A.  Food Plan:  focus on no between meal snacking, aggressive lowering of starches and cheese.  Activity Plan:  " Activity journal.  Supplementary:  N/A.  Medication:  The patient will begin medication in pursuit of improved medical status as influenced by body weight. She will start Qsymia.  There is a mutual understanding of the goals and risks of this therapy. The patient is in agreement. She is educated on dosage regimen and possible side effects.    RTC:    12 weeks.    Sincerely,  Albert Mccann MD

## 2025-03-05 ENCOUNTER — MYC MEDICAL ADVICE (OUTPATIENT)
Dept: ENDOCRINOLOGY | Facility: CLINIC | Age: 30
End: 2025-03-05
Payer: COMMERCIAL

## 2025-03-05 DIAGNOSIS — E66.811 CLASS 1 OBESITY DUE TO EXCESS CALORIES WITH SERIOUS COMORBIDITY IN ADULT, UNSPECIFIED BMI: Primary | ICD-10-CM

## 2025-03-05 DIAGNOSIS — E66.09 CLASS 1 OBESITY DUE TO EXCESS CALORIES WITH SERIOUS COMORBIDITY IN ADULT, UNSPECIFIED BMI: Primary | ICD-10-CM

## 2025-04-29 ENCOUNTER — TELEPHONE (OUTPATIENT)
Dept: GASTROENTEROLOGY | Facility: CLINIC | Age: 30
End: 2025-04-29
Payer: COMMERCIAL

## 2025-04-29 NOTE — TELEPHONE ENCOUNTER
MTM appointment canceled/no showed, we made one more attempt to reschedule.     Routing back to referring provider and MTM Pharmacist Team        Bravo Ulrich  MTM

## 2025-04-30 ENCOUNTER — TELEPHONE (OUTPATIENT)
Dept: GASTROENTEROLOGY | Facility: CLINIC | Age: 30
End: 2025-04-30
Payer: COMMERCIAL

## 2025-04-30 NOTE — TELEPHONE ENCOUNTER
Left Voicemail (1st Attempt) for the patient to call back and schedule the following:    Appointment type: return    Provider: Camilo Rodriguez   Return date: 7/2/2025  Specialty phone number: [phone]  Additional appointment(s) needed:   Additional Notes:

## 2025-08-08 ENCOUNTER — TELEPHONE (OUTPATIENT)
Dept: ENDOCRINOLOGY | Facility: CLINIC | Age: 30
End: 2025-08-08
Payer: COMMERCIAL

## 2025-08-08 DIAGNOSIS — E66.09 CLASS 1 OBESITY DUE TO EXCESS CALORIES WITH SERIOUS COMORBIDITY IN ADULT, UNSPECIFIED BMI: ICD-10-CM

## 2025-08-08 DIAGNOSIS — E66.811 CLASS 1 OBESITY DUE TO EXCESS CALORIES WITH SERIOUS COMORBIDITY IN ADULT, UNSPECIFIED BMI: ICD-10-CM

## 2025-08-11 RX ORDER — PHENTERMINE AND TOPIRAMATE EXTENDED-RELEASE 7.5; 46 MG/1; MG/1
1 CAPSULE ORAL DAILY
Qty: 30 CAPSULE | Refills: 5 | Status: SHIPPED | OUTPATIENT
Start: 2025-08-11

## 2025-08-18 DIAGNOSIS — K51.00 ULCERATIVE PANCOLITIS (H): ICD-10-CM

## 2025-08-18 RX ORDER — USTEKINUMAB 90 MG/ML
INJECTION, SOLUTION SUBCUTANEOUS
Qty: 1 ML | Refills: 2 | Status: SHIPPED | OUTPATIENT
Start: 2025-08-18

## (undated) DEVICE — TUBING SUCTION MEDI-VAC 1/4"X20' N620A

## (undated) DEVICE — KIT ENDO FIRST STEP DISINFECTANT 200ML W/POUCH EP-4

## (undated) DEVICE — SUCTION MANIFOLD NEPTUNE 2 SYS 1 PORT 702-025-000

## (undated) DEVICE — PAD CHUX UNDERPAD 30X36" P3036C

## (undated) DEVICE — SOL WATER IRRIG 500ML BOTTLE 2F7113

## (undated) DEVICE — KIT ENDO TURNOVER/PROCEDURE CARRY-ON 101822

## (undated) DEVICE — ENDO FORCEP BX CAPTURA PRO SPIKE G50696

## (undated) DEVICE — FCP BIOPSY RADIAL JAW 4 JUMBO 3.2MM CHANNEL M00513370

## (undated) DEVICE — SPECIMEN CONTAINER 3OZ W/FORMALIN 59901

## (undated) DEVICE — GOWN IMPERVIOUS 2XL BLUE

## (undated) DEVICE — ENDO FORCEP BX CAPTURA LG SPIKE 2.4MMX230CM G53641

## (undated) DEVICE — SNARE CAPIVATOR ROUND COLD SNR BX10 M00561101

## (undated) RX ORDER — FENTANYL CITRATE 50 UG/ML
INJECTION, SOLUTION INTRAMUSCULAR; INTRAVENOUS
Status: DISPENSED
Start: 2023-11-20

## (undated) RX ORDER — FENTANYL CITRATE 50 UG/ML
INJECTION, SOLUTION INTRAMUSCULAR; INTRAVENOUS
Status: DISPENSED
Start: 2023-08-09

## (undated) RX ORDER — FENTANYL CITRATE 50 UG/ML
INJECTION, SOLUTION INTRAMUSCULAR; INTRAVENOUS
Status: DISPENSED
Start: 2023-05-31

## (undated) RX ORDER — FENTANYL CITRATE 50 UG/ML
INJECTION, SOLUTION INTRAMUSCULAR; INTRAVENOUS
Status: DISPENSED
Start: 2024-10-02

## (undated) RX ORDER — ONDANSETRON 2 MG/ML
INJECTION INTRAMUSCULAR; INTRAVENOUS
Status: DISPENSED
Start: 2023-11-20

## (undated) RX ORDER — FENTANYL CITRATE 50 UG/ML
INJECTION, SOLUTION INTRAMUSCULAR; INTRAVENOUS
Status: DISPENSED
Start: 2023-02-27

## (undated) RX ORDER — ONDANSETRON 2 MG/ML
INJECTION INTRAMUSCULAR; INTRAVENOUS
Status: DISPENSED
Start: 2023-05-31